# Patient Record
Sex: MALE | Race: BLACK OR AFRICAN AMERICAN | Employment: FULL TIME | URBAN - METROPOLITAN AREA
[De-identification: names, ages, dates, MRNs, and addresses within clinical notes are randomized per-mention and may not be internally consistent; named-entity substitution may affect disease eponyms.]

---

## 2017-04-06 ENCOUNTER — OFFICE VISIT (OUTPATIENT)
Dept: ORTHOPEDIC SURGERY | Age: 42
End: 2017-04-06

## 2017-04-06 VITALS
HEART RATE: 83 BPM | BODY MASS INDEX: 27.17 KG/M2 | DIASTOLIC BLOOD PRESSURE: 86 MMHG | WEIGHT: 205 LBS | SYSTOLIC BLOOD PRESSURE: 138 MMHG | HEIGHT: 73 IN

## 2017-04-06 DIAGNOSIS — M65.4 RADIAL STYLOID TENOSYNOVITIS OF RIGHT HAND: Primary | ICD-10-CM

## 2017-04-06 PROCEDURE — 20550 NJX 1 TENDON SHEATH/LIGAMENT: CPT | Performed by: ORTHOPAEDIC SURGERY

## 2017-04-06 PROCEDURE — 99213 OFFICE O/P EST LOW 20 MIN: CPT | Performed by: ORTHOPAEDIC SURGERY

## 2017-04-06 RX ORDER — SULFAMETHOXAZOLE AND TRIMETHOPRIM 800; 160 MG/1; MG/1
TABLET ORAL
COMMUNITY
Start: 2017-03-16 | End: 2017-05-19 | Stop reason: ALTCHOICE

## 2017-04-06 RX ORDER — TRIAMCINOLONE ACETONIDE 1 MG/G
CREAM TOPICAL
COMMUNITY
Start: 2017-03-22 | End: 2017-11-21 | Stop reason: ALTCHOICE

## 2017-04-06 RX ORDER — DARUNAVIR ETHANOLATE AND COBICISTAT 800; 150 MG/1; MG/1
1 TABLET, FILM COATED ORAL EVERY EVENING
COMMUNITY
Start: 2017-03-01

## 2017-04-06 RX ORDER — IMIQUIMOD 12.5 MG/.25G
CREAM TOPICAL
COMMUNITY
Start: 2017-03-15 | End: 2017-11-21 | Stop reason: ALTCHOICE

## 2017-04-06 RX ORDER — ABACAVIR AND LAMIVUDINE 600; 300 MG/1; MG/1
1 TABLET, FILM COATED ORAL EVERY EVENING
COMMUNITY
Start: 2017-03-29

## 2017-04-06 RX ORDER — AMMONIUM LACTATE 12 G/100G
LOTION TOPICAL
COMMUNITY
Start: 2017-01-28 | End: 2017-11-21 | Stop reason: ALTCHOICE

## 2017-12-07 ENCOUNTER — OFFICE VISIT (OUTPATIENT)
Dept: ORTHOPEDIC SURGERY | Age: 42
End: 2017-12-07

## 2017-12-07 VITALS — RESPIRATION RATE: 16 BRPM | WEIGHT: 213 LBS | HEIGHT: 73 IN | BODY MASS INDEX: 28.23 KG/M2

## 2017-12-07 DIAGNOSIS — M65.4 RADIAL STYLOID TENOSYNOVITIS OF RIGHT HAND: Primary | ICD-10-CM

## 2017-12-07 PROCEDURE — 99212 OFFICE O/P EST SF 10 MIN: CPT | Performed by: ORTHOPAEDIC SURGERY

## 2017-12-07 PROCEDURE — G8419 CALC BMI OUT NRM PARAM NOF/U: HCPCS | Performed by: ORTHOPAEDIC SURGERY

## 2017-12-07 PROCEDURE — 4004F PT TOBACCO SCREEN RCVD TLK: CPT | Performed by: ORTHOPAEDIC SURGERY

## 2017-12-07 PROCEDURE — G8427 DOCREV CUR MEDS BY ELIG CLIN: HCPCS | Performed by: ORTHOPAEDIC SURGERY

## 2017-12-07 PROCEDURE — G8484 FLU IMMUNIZE NO ADMIN: HCPCS | Performed by: ORTHOPAEDIC SURGERY

## 2017-12-07 NOTE — COMMUNICATION BODY
The patient returns to the office because of recurrent pain and swelling of the right  wrist.  The previous injection was completely successful in relieving the prior symptoms, but unfortunately they have recurred. Symptoms include pain in the area of the radial styloid, aggravated by wrist and thumb movement, swelling and erythema. The condition seems to be worsening. There are no symptoms noted in the opposite wrist.    The patient's social history, past medical history, family history, medications, allergies and review of systems have been reviewed, and dated and are recorded in the chart. On physical exam there is soft tissue swelling in the area of the radial styloid. There is tenderness over the first dorsal compartment. The Trino test is positive. Skin is intact, as is distal circulation and sensation. Gross muscle strength is normal bilaterally. Hand and wrist joints are stable. There are no subcutaneous nodules or enlarged epitrochlear lymph nodes. There are no abnormal clinical findings in the opposite wrist.      Impression:  Recurrent radial styloid tendonitis right wrist.    The patient declines an additional steroid injection. The nature of their medical problem is fully discussed with the patient, including all treatment options. Surgery is also discussed, including the possible risks, complications, prognosis and postoperative care. All questions are answered. The surgery consent form is explained and signed. Surgery will be scheduled and the patient is asked to call me if there are any additional questions. The patient understands that the surgery will be done by Dr. Kika Reyna.

## 2017-12-07 NOTE — LETTER
[facility]  [address] [facility] [place]  [address] [facility] [address]  Phone: [phone]  Fax: [phone]    Nicki Irby MD        December 7, 2017     [address] [facility]    Patient: Mina Ruiz  MR Number: [id number]  YOB: 1975  Date of Visit: 12/7/2017    Dear Dr. Brown Mess:    Aisha Doll was seen for evaluation. Below are the relevant portions of my assessment and plan of care. The patient returns to the office because of recurrent pain and swelling of the right  wrist.  The previous injection was completely successful in relieving the prior symptoms, but unfortunately they have recurred. Symptoms include pain in the area of the radial styloid, aggravated by wrist and thumb movement, swelling and erythema. The condition seems to be worsening. There are no symptoms noted in the opposite wrist.    The patient's social history, past medical history, family history, medications, allergies and review of systems have been reviewed, and dated and are recorded in the chart. On physical exam there is soft tissue swelling in the area of the radial styloid. There is tenderness over the first dorsal compartment. The [place] test is positive. Skin is intact, as is distal circulation and sensation. Gross muscle strength is normal bilaterally. Hand and wrist joints are stable. There are no subcutaneous nodules or enlarged epitrochlear lymph nodes. There are no abnormal clinical findings in the opposite wrist.      Impression:  Recurrent radial styloid tendonitis right wrist.    The patient declines an additional steroid injection. The nature of their medical problem is fully discussed with the patient, including all treatment options. Surgery is also discussed, including the possible risks, complications, prognosis and postoperative care.   All

## 2017-12-07 NOTE — LETTER
CONSENT TO OPERATION  AND/OR OTHER PROCEDURE(S)          PATIENT : Gracie Colby OF BIRTH:  1975      DATE : 12/7/17          1. I request and consent that Dr. Delfin Verdugo. Harjinder Bautista,  and/or his associates or assistants perform an operation and/or procedures on the above patient at  [place] [address], to treat the condition(s) which appear indicated by the diagnostic studies already performed. I have been told that in general terms the nature, purpose and reasonable expectations of the operation and/or procedure(s) are:     Release Right First Dorsal Compartment      2. It has been explained to me by the informing physician that during the course of the operation and/or procedure(s) unforeseen conditions may be revealed that necessitate an extension of the original operation and/or procedure(s) or different operation and/or procedures than those set forth in Paragraph 1. I therefore authorize and request that my physician and/or his associates or assistants perform such operations and/or procedures as are necessary and desirable in the exercise of professional judgment. The authority granted under this Paragraph 2 shall extend to all conditions that require treatment and are known to my physician at the time the operation is commenced. 3. I have been made aware by the informing physician of certain risks and consequences that are associated with the operation and/or procedure(s) described in Paragraph 1, the reasonable alternative methods or treatment, the possible consequences, the possibility that the operation and/or procedure(s) may be unsuccessful and the possibility of complications. I understand the reasonably known risks to be:      ? Bleeding  ? Infection  ? Poor Healing  ? Possible Damage to Nerve, Vessel, Tendon/Muscle or Bone  ? Need for further Treatment/Surgery  ? Stiffness  ? Pain  ? Residual or Recurrent Symptoms  ? Anesthetic and/or Medical Risks  ? 4. I have also been informed by the informing physician that there are other risks from both known and unknown causes that are attendant to the performance of any surgical procedure. I am aware that the practice of medicine and surgery is not an exact science, and that no guarantees have been made to me concerning the results of the operation and/or procedure(s). 5. I   CONSENT / REFUSE CONSENT  (strike the phrase that does not apply) to the taking of photographs before, during and/or after the operation or procedure for scientific/educational purposes. 6. I consent to the administration of anesthesia and to the use of such anesthetics as may be deemed advisable by the anesthesiologist who has been engaged by me or my physician. 7. I certify that I have read and understand the above consent to operation and/or other procedure(s); that the explanations therein referred to were made to me by the informing physician in advance of my signing this consent; that all blanks or statements requiring insertion or completion were filled in and inapplicable paragraphs, if any, were stricken before I signed; and that all questions asked by me about the operation and/or procedure(s) which I have consented to have been fully answered in a satisfactory manner.               _______________________  Witness        Signature Of Patient          Raymond Medley. Palak Juarez      _______________________  Informing Physician         Signature of Informing Physician           If patient is unable to sign or is a minor, complete one of the following:    (A)  Patient is a minor   years of age. (B)  Patient is unable to sign because: The undersigned represents that he or she is duly authorized to execute this consent for and on behalf of the above named patient.                Witness               o  Parent  o  Guardian   o  Spouse       o  Other (specify)

## 2017-12-07 NOTE — LETTER
[facility]  Surgery Scheduling Form:  DEMOGRAPHICS:                                                                                                                Patient Name:  Abiola Hanson  Patient :  1975   Patient SS#:  [id number]    Patient Phone:  [phone] (home)      Patient Address:  [address] [place] [address]    PCP:  Tika Mccollum 1620:   Payor/Plan Subscr  Sex Relation Sub. Ins. ID Effective Group Num   1. [employer] - * AKANKSHA PEARL 1975 Male Self [id number] [address]                                   [address]   2.  - TRI* AKANKSHA PEARL 1975 Male  [id number]                                    [address]     DIAGNOSIS & PROCEDURE:                                                                                              Diagnosis:   right DeQuervain's Tendonitis  (727.04)  Operation:  right 1st Dorsal Compartment (DeQuervain's) Release  Location:  [facility]  Surgeon:  Norbert Winn    SCHEDULING INFORMATION:                                                                                         .  Surgeon's Scheduling Instruction:  Elective      Requested Date:    OR Time:  8:15 Patient Arrival Time:  6:45    OR Time Required:  10  Minutes  Anesthesia:  MAC/TIVA  Equipment:  None  Mini C-Arm:  No   Standard C-Arm:  No  Status:  outpatient  PAT Required:  Yes  Comments: After the holidays. ALLERGIES:   KINDRA Andrews MD  17     BILLING INFORMATION:                                                                                                     Procedure:       CPT Code Modifier

## 2017-12-08 NOTE — ADDENDUM NOTE
Encounter addended by: Rosanna Wolfe MA on: 12/8/2017  9:35 AM<BR>    Actions taken: Letter status changed

## 2018-01-17 ENCOUNTER — TELEPHONE (OUTPATIENT)
Dept: ORTHOPEDIC SURGERY | Age: 43
End: 2018-01-17

## 2018-01-17 NOTE — ADDENDUM NOTE
Encounter addended by: Moriah Stock MA on: 1/17/2018 11:47 AM<BR>    Actions taken: Letter status changed

## 2018-01-17 NOTE — ADDENDUM NOTE
Encounter addended by: Dexter Ambrosio MA on: 1/17/2018 11:51 AM<BR>    Actions taken: Letter status changed

## 2018-01-18 ENCOUNTER — PAT TELEPHONE (OUTPATIENT)
Dept: PREADMISSION TESTING | Age: 43
End: 2018-01-18

## 2018-01-18 VITALS — WEIGHT: 208 LBS | BODY MASS INDEX: 27.57 KG/M2 | HEIGHT: 73 IN

## 2018-01-18 RX ORDER — SULFAMETHOXAZOLE AND TRIMETHOPRIM 800; 160 MG/1; MG/1
1 TABLET ORAL EVERY EVENING
COMMUNITY
Start: 2017-11-08 | End: 2018-06-05

## 2018-01-18 RX ORDER — TRIAMCINOLONE ACETONIDE 1 MG/G
CREAM TOPICAL PRN
COMMUNITY
Start: 2017-11-22 | End: 2018-06-05

## 2018-01-18 RX ORDER — AZITHROMYCIN 600 MG/1
600 TABLET, FILM COATED ORAL SEE ADMIN INSTRUCTIONS
COMMUNITY
End: 2018-05-16 | Stop reason: ALTCHOICE

## 2018-01-18 NOTE — PRE-PROCEDURE INSTRUCTIONS
C-Difficile admission screening and protocol:     * Admitted with diarrhea? no     *Prior history of C-Diff. In last 3 months? no     *Antibiotic use in the past 6-8 weeks?-yes     *Prior hospitalization or nursing home in the last month?     no
any jewelry or body piercing's on the day of surgery. All jewelry must be removed. If you have dentures, they will be removed before going to operating room. For your convenience, we will provide you with a container. If you wear contact lenses or glasses, they will be removed, please bring a case for them. If you have a living will and a durable power of  for healthcare, please bring in a copy. As part of our patient safety program to minimize surgical site infections, we ask you to do the following:    · Please notify your surgeon if you develop any illness between         now and the  day of your surgery. · This includes a cough, cold, fever, sore throat, nausea,         or vomiting, and diarrhea, etc.  ·  Please notify your surgeon if you experience dizziness, shortness         of breath or blurred vision between now and the time of your surgery. Do not shave your operative site 96 hours prior to surgery. For face and neck surgery, men may use an electric razor 48 hours   prior to surgery. You may shower the night before surgery or the morning of   your surgery with an antibacterial soap. You will need to bring a photo ID and insurance card    [facility] has an onsite pharmacy, would you like to utilize our pharmacy     If you will be staying overnight and use a C-pap machine, please bring   your C-pap to hospital     Our goal is to provide you with excellent care, therefore, visitors will be limited to two(2) in the room at a time so that we may focus on providing this care for you. Please contact pre-admission testing if you have any further questions. [facility] phone number:  [phone] [facility] fax number:  [phone]  Please note these are generalized instructions for all surgical cases, you may be provided with more specific instructions according to your surgery.

## 2018-01-19 ENCOUNTER — PAT TELEPHONE (OUTPATIENT)
Dept: PREADMISSION TESTING | Age: 43
End: 2018-01-19

## 2018-01-23 ENCOUNTER — HOSPITAL ENCOUNTER (OUTPATIENT)
Dept: SURGERY | Age: 43
Discharge: HOME | End: 2018-01-23
Attending: ORTHOPAEDIC SURGERY | Admitting: ORTHOPAEDIC SURGERY

## 2018-01-23 VITALS
OXYGEN SATURATION: 98 % | HEART RATE: 68 BPM | BODY MASS INDEX: 26.88 KG/M2 | HEIGHT: 73 IN | TEMPERATURE: 98 F | WEIGHT: 202.82 LBS | DIASTOLIC BLOOD PRESSURE: 97 MMHG | SYSTOLIC BLOOD PRESSURE: 180 MMHG | RESPIRATION RATE: 16 BRPM

## 2018-01-23 RX ORDER — OXYCODONE HYDROCHLORIDE 5 MG/1
10 TABLET ORAL PRN
Status: COMPLETED | OUTPATIENT
Start: 2018-01-23 | End: 2018-01-23

## 2018-01-23 RX ORDER — MORPHINE SULFATE 2 MG/ML
1 INJECTION, SOLUTION INTRAMUSCULAR; INTRAVENOUS EVERY 5 MIN PRN
Status: DISCONTINUED | OUTPATIENT
Start: 2018-01-23 | End: 2018-01-24 | Stop reason: HOSPADM

## 2018-01-23 RX ORDER — FENTANYL CITRATE 50 UG/ML
50 INJECTION, SOLUTION INTRAMUSCULAR; INTRAVENOUS EVERY 5 MIN PRN
Status: DISCONTINUED | OUTPATIENT
Start: 2018-01-23 | End: 2018-01-24 | Stop reason: HOSPADM

## 2018-01-23 RX ORDER — SODIUM CHLORIDE 0.9 % (FLUSH) 0.9 %
10 SYRINGE (ML) INJECTION PRN
Status: DISCONTINUED | OUTPATIENT
Start: 2018-01-23 | End: 2018-01-24 | Stop reason: HOSPADM

## 2018-01-23 RX ORDER — ONDANSETRON 2 MG/ML
4 INJECTION INTRAMUSCULAR; INTRAVENOUS
Status: ACTIVE | OUTPATIENT
Start: 2018-01-23 | End: 2018-01-23

## 2018-01-23 RX ORDER — SODIUM CHLORIDE 9 MG/ML
INJECTION, SOLUTION INTRAVENOUS CONTINUOUS
Status: DISCONTINUED | OUTPATIENT
Start: 2018-01-23 | End: 2018-01-24 | Stop reason: HOSPADM

## 2018-01-23 RX ORDER — MEPERIDINE HYDROCHLORIDE 25 MG/ML
12.5 INJECTION INTRAMUSCULAR; INTRAVENOUS; SUBCUTANEOUS EVERY 5 MIN PRN
Status: DISCONTINUED | OUTPATIENT
Start: 2018-01-23 | End: 2018-01-24 | Stop reason: HOSPADM

## 2018-01-23 RX ORDER — OXYCODONE HYDROCHLORIDE 5 MG/1
5 TABLET ORAL PRN
Status: COMPLETED | OUTPATIENT
Start: 2018-01-23 | End: 2018-01-23

## 2018-01-23 RX ORDER — SODIUM CHLORIDE 0.9 % (FLUSH) 0.9 %
10 SYRINGE (ML) INJECTION EVERY 12 HOURS SCHEDULED
Status: DISCONTINUED | OUTPATIENT
Start: 2018-01-23 | End: 2018-01-24 | Stop reason: HOSPADM

## 2018-01-23 RX ORDER — FENTANYL CITRATE 50 UG/ML
25 INJECTION, SOLUTION INTRAMUSCULAR; INTRAVENOUS EVERY 5 MIN PRN
Status: DISCONTINUED | OUTPATIENT
Start: 2018-01-23 | End: 2018-01-24 | Stop reason: HOSPADM

## 2018-01-23 RX ORDER — MORPHINE SULFATE 2 MG/ML
2 INJECTION, SOLUTION INTRAMUSCULAR; INTRAVENOUS EVERY 5 MIN PRN
Status: DISCONTINUED | OUTPATIENT
Start: 2018-01-23 | End: 2018-01-24 | Stop reason: HOSPADM

## 2018-01-23 RX ADMIN — FENTANYL CITRATE 50 MCG: 50 INJECTION, SOLUTION INTRAMUSCULAR; INTRAVENOUS at 08:41

## 2018-01-23 RX ADMIN — SODIUM CHLORIDE: 9 INJECTION, SOLUTION INTRAVENOUS at 07:30

## 2018-01-23 RX ADMIN — FENTANYL CITRATE 50 MCG: 50 INJECTION, SOLUTION INTRAMUSCULAR; INTRAVENOUS at 08:46

## 2018-01-23 RX ADMIN — OXYCODONE HYDROCHLORIDE 10 MG: 5 TABLET ORAL at 09:19

## 2018-01-23 ASSESSMENT — PAIN SCALES - GENERAL
PAINLEVEL_OUTOF10: 6
PAINLEVEL_OUTOF10: 3
PAINLEVEL_OUTOF10: 7
PAINLEVEL_OUTOF10: 9

## 2018-01-23 ASSESSMENT — PAIN - FUNCTIONAL ASSESSMENT: PAIN_FUNCTIONAL_ASSESSMENT: 0-10

## 2018-01-23 ASSESSMENT — PAIN DESCRIPTION - ORIENTATION
ORIENTATION: LEFT
ORIENTATION: LEFT

## 2018-01-23 ASSESSMENT — PAIN DESCRIPTION - LOCATION
LOCATION: ARM
LOCATION: ARM

## 2018-01-23 ASSESSMENT — LIFESTYLE VARIABLES: SMOKING_STATUS: 0

## 2018-01-23 ASSESSMENT — PAIN DESCRIPTION - PAIN TYPE
TYPE: SURGICAL PAIN
TYPE: SURGICAL PAIN

## 2018-01-23 ASSESSMENT — ENCOUNTER SYMPTOMS: SHORTNESS OF BREATH: 0

## 2018-01-23 NOTE — ANESTHESIA PRE-OP
Department of Anesthesiology  Preprocedure Note       Name:  Blanca Goyal   Age:  43 y.o.  :  1975                                          MRN:  [id number]         Date:  2018      [facility] Department of Anesthesiology  Pre-Anesthesia Evaluation/Consultation       Name:  Nohemi Marrero Sr                                         Age:  43 y.o. MRN:  [id number]           Procedure (Scheduled):  Right first dorsal compartment release  Surgeon:  Dr. Brandon Nice     Allergies   Allergen Reactions    Amoxicillin Hives     sweats    Penicillins Hives     sweats     Patient Active Problem List   Diagnosis    Radial styloid tenosynovitis of right hand    Sprain of finger of left hand     Past Medical History:   Diagnosis Date    Asthma     as a child    HIV disease ([facility] [address]      Past Surgical History:   Procedure Laterality Date    ANKLE SURGERY Right      Social History   Substance Use Topics    Smoking status: Current Every Day Smoker     Packs/day: 0.50     Years: 20.00     Types: Cigarettes    Smokeless tobacco: Never Used    Alcohol use Yes      Comment: socially     Medications  Current Outpatient Prescriptions on File Prior to Encounter   Medication Sig Dispense Refill    triamcinolone (KENALOG) 0.1 % cream Apply topically as needed       sulfamethoxazole-trimethoprim (BACTRIM DS;SEPTRA DS) 800-160 MG per tablet Take 1 tablet by mouth every evening       azithromycin (ZITHROMAX) 600 MG tablet Take 600 mg by mouth See Admin Instructions 2times/week      ibuprofen (ADVIL;MOTRIN) 600 MG tablet Take 1 tablet by mouth every 6 hours as needed for Pain 20 tablet 0    abacavir-lamivudine (EPZICOM) 600-300 MG per tablet Take 1 tablet by mouth every evening       PREZCOBIX 800-150 MG TABS Take 1 tablet by mouth every evening        No current facility-administered medications on file prior to encounter.       Current Outpatient Prescriptions   Medication Sig Dispense Refill    triamcinolone 01/19/2015    AGRATIO 1.0 01/19/2015    LABGLOM >60 01/19/2015    GLUCOSE 93 01/19/2015    PROT 7.5 01/19/2015    CALCIUM 9.4 01/19/2015    BILITOT 0.7 01/19/2015    ALKPHOS 76 01/19/2015    AST 19 01/19/2015    ALT 15 01/19/2015     BMP    Lab Results   Component Value Date     01/19/2015    K 3.6 01/19/2015     01/19/2015    CO2 25 01/19/2015    BUN 12 01/19/2015    CREATININE 0.7 01/19/2015    CALCIUM 9.4 01/19/2015    GFRAA >60 01/19/2015    LABGLOM >60 01/19/2015    GLUCOSE 93 01/19/2015     POCGlucose  No results for input(s): GLUCOSE in the last 72 hours. Coags  No results found for: PROTIME, INR, APTT  HCG (If Applicable) No results found for: PREGTESTUR, PREGSERUM, HCG, HCGQUANT   ABGs No results found for: PHART, PO2ART, AWI8PSD, ELL5XSC, BEART, R3QQJYZF   Type & Screen (If Applicable)  No results found for: [facility]      Surgeon:    Procedure:    Medications prior to admission:   Prior to Admission medications    Medication Sig Start Date End Date Taking?  Authorizing Provider   triamcinolone (KENALOG) 0.1 % cream Apply topically as needed  11/22/17   Historical Provider, MD   sulfamethoxazole-trimethoprim (BACTRIM DS;SEPTRA DS) 800-160 MG per tablet Take 1 tablet by mouth every evening  11/8/17   Historical Provider, MD   azithromycin (ZITHROMAX) 600 MG tablet Take 600 mg by mouth See Admin Instructions 2times/week    Historical Provider, MD   ibuprofen (ADVIL;MOTRIN) 600 MG tablet Take 1 tablet by mouth every 6 hours as needed for Pain 12/30/17   Zoila Melendez PA-C   abacavir-lamivudine (EPZICOM) 600-300 MG per tablet Take 1 tablet by mouth every evening  3/29/17   Historical Provider, MD   PREZCOBIX 800-150 MG TABS Take 1 tablet by mouth every evening  3/1/17   Historical Provider, MD       Current medications:    Current Outpatient Prescriptions   Medication Sig Dispense Refill    triamcinolone (KENALOG) 0.1 % cream Apply topically as needed       sulfamethoxazole-trimethoprim (BACTRIM DS;SEPTRA DS) 800-160 MG per tablet Take 1 tablet by mouth every evening       azithromycin (ZITHROMAX) 600 MG tablet Take 600 mg by mouth See Admin Instructions 2times/week      ibuprofen (ADVIL;MOTRIN) 600 MG tablet Take 1 tablet by mouth every 6 hours as needed for Pain 20 tablet 0    abacavir-lamivudine (EPZICOM) 600-300 MG per tablet Take 1 tablet by mouth every evening       PREZCOBIX 800-150 MG TABS Take 1 tablet by mouth every evening        No current facility-administered medications for this encounter. Allergies: Allergies   Allergen Reactions    Amoxicillin Hives     sweats    Penicillins Hives     sweats       Problem List:    Patient Active Problem List   Diagnosis Code    Radial styloid tenosynovitis of right hand M65.4    Sprain of finger of left hand S63.619A       Past Medical History:        Diagnosis Date    Asthma     as a child    HIV disease ([facility] [address]        Past Surgical History:        Procedure Laterality Date    ANKLE SURGERY Right        Social History:    Social History   Substance Use Topics    Smoking status: Current Every Day Smoker     Packs/day: 0.50     Years: 20.00     Types: Cigarettes    Smokeless tobacco: Never Used    Alcohol use Yes      Comment: socially                                Ready to quit: Not Answered  Counseling given: Not Answered      Vital Signs (Current):   Vitals:    01/23/18 0700   BP: (!) 172/99   Pulse: 77   Temp: 97.1 °F (36.2 °C)   SpO2: 99%                                              BP Readings from Last 3 Encounters:   01/23/18 (!) 172/99   12/30/17 (!) 169/105   12/01/17 (!) 165/90       NPO Status:                                                    more than 8hours                             BMI:   Wt Readings from Last 3 Encounters:   01/18/18 208 lb (94.3 kg)   12/30/17 205 lb 14.6 oz (93.4 kg)   12/07/17 213 lb (96.6 kg)     There is no height or weight on file to calculate BMI.     Anesthesia Evaluation  Patient summary reviewed  Airway: Mallampati: II  TM distance: >3 FB   Neck ROM: full  Mouth opening: > = 3 FB Dental:          Pulmonary:normal exam    (+) sleep apnea:  asthma:     (-) pneumonia, COPD, shortness of breath, recent URI and not a current smoker                           Cardiovascular:Negative CV ROS  Exercise tolerance: good (>4 METS),           Rhythm: regular  Rate: normal                    Neuro/Psych:   Negative Neuro/Psych ROS              GI/Hepatic/Renal:        (-) hiatal hernia, GERD, PUD, hepatitis, liver disease, no renal disease, bowel prep and no morbid obesity       Endo/Other:    (+) no malignancy/cancer. (-) hypothyroidism, hyperthyroidism, blood dyscrasia, arthritis, no Type II DM, no Type I DM, no electrolyte abnormalities, no malignancy/cancer                ROS comment: HIV + Abdominal:           Vascular: negative vascular ROS. Anesthesia Plan      MAC     ASA 3       Induction: intravenous. MIPS: Prophylactic antiemetics administered. Anesthetic plan and risks discussed with patient. Plan discussed with CRNA. Karma Patel MD   1/23/2018        This pre-anesthesia assessment may be used as a history and physical.    DOS STAFF ADDENDUM:    Pt seen and examined, chart reviewed (including anesthesia, drug and allergy history). No interval changes to history and physical examination. Anesthetic plan, risks, benefits, alternatives, and personnel involved discussed with patient. Patient verbalized an understanding and agrees to proceed.       Karma Patel MD  January 23, 2018  7:09 AM

## 2018-01-23 NOTE — PROGRESS NOTES
Alert and oriented. C/o 7/10 surgical right arm pain. Dressing clean dry intact. Fingers warm, move well, yari well. Tolerated sitting up an po fluids and cookies well. Wife at bedside. Understands discharge instructions. Analgesic given for c/o pain. BP has been elevated the whole time of this hospitalization.  Patient says he is aware and is going to follow up with his

## 2018-01-26 NOTE — ADDENDUM NOTE
Encounter addended by: Lisa Parmar MD on: 1/26/2018  8:01 AM<BR>    Actions taken: Letter status changed

## 2018-01-29 ENCOUNTER — OFFICE VISIT (OUTPATIENT)
Dept: ORTHOPEDIC SURGERY | Age: 43
End: 2018-01-29

## 2018-01-29 VITALS — HEIGHT: 73 IN | BODY MASS INDEX: 26.77 KG/M2 | WEIGHT: 202 LBS | RESPIRATION RATE: 16 BRPM

## 2018-01-29 DIAGNOSIS — M65.4 RADIAL STYLOID TENOSYNOVITIS OF RIGHT HAND: Primary | ICD-10-CM

## 2018-01-29 PROCEDURE — 99024 POSTOP FOLLOW-UP VISIT: CPT | Performed by: PHYSICIAN ASSISTANT

## 2018-01-29 RX ORDER — IMIQUIMOD 12.5 MG/.25G
CREAM TOPICAL
COMMUNITY
Start: 2018-01-17 | End: 2019-02-20

## 2018-01-31 NOTE — ADDENDUM NOTE
Encounter addended by: Zach Villalpando MA on: 1/31/2018  8:39 AM<BR>    Actions taken: Letter status changed

## 2018-02-08 ENCOUNTER — OFFICE VISIT (OUTPATIENT)
Dept: ORTHOPEDIC SURGERY | Age: 43
End: 2018-02-08

## 2018-02-08 VITALS — HEIGHT: 73 IN | RESPIRATION RATE: 16 BRPM | BODY MASS INDEX: 26.77 KG/M2 | WEIGHT: 202 LBS

## 2018-02-08 DIAGNOSIS — M65.4 RADIAL STYLOID TENOSYNOVITIS: Primary | ICD-10-CM

## 2018-02-08 PROCEDURE — 99024 POSTOP FOLLOW-UP VISIT: CPT | Performed by: ORTHOPAEDIC SURGERY

## 2018-02-08 NOTE — PROGRESS NOTES
Mr. Idania Drummond is seen today in follow of his Right DeQuervain's release done 16 days ago. He presents today for scheduled suture removal according to Dr. Crista Elliott' postoperative protocol. He today has no other new concerns. His wound is inspected and is healing well, no significant drainage, no dehiscence, no significant erythema. There appear to be no contraindications to suture removal.    Mr. Wyatt Babcock Sr's sutures were removed today without difficulty. Skin remained well approximated. He is provided appropriate instructions on the management of the incision and is asked to follow all of Dr. Bre Perez' prior instructions. Mr. Idania Drummond is asked to contact Dr. Bre Perez or schedule a follow-up appointment at any time that he feels the need for any further evaluation or treatment for his incision or requires any further post-operative care. If he feels that he continues to be feeling and functioning well, he may choose not to seek any further follow-up or treatment at his discretion. Our office will remain available to continue his care at any time in the future.

## 2019-02-20 ENCOUNTER — HOSPITAL ENCOUNTER (EMERGENCY)
Age: 44
Discharge: HOME | End: 2019-02-20
Attending: EMERGENCY MEDICINE
Payer: OTHER GOVERNMENT

## 2019-02-20 VITALS
WEIGHT: 216.49 LBS | OXYGEN SATURATION: 99 % | DIASTOLIC BLOOD PRESSURE: 74 MMHG | HEART RATE: 79 BPM | SYSTOLIC BLOOD PRESSURE: 158 MMHG | HEIGHT: 73 IN | BODY MASS INDEX: 28.69 KG/M2 | TEMPERATURE: 98.4 F | RESPIRATION RATE: 16 BRPM

## 2019-02-20 DIAGNOSIS — M79.18 MUSCULOSKELETAL PAIN: Primary | ICD-10-CM

## 2019-02-20 PROCEDURE — 99283 EMERGENCY DEPT VISIT LOW MDM: CPT

## 2019-02-20 RX ORDER — ACETAMINOPHEN 500 MG
500 TABLET ORAL 4 TIMES DAILY PRN
Qty: 60 TABLET | Refills: 0 | Status: SHIPPED | OUTPATIENT
Start: 2019-02-20 | End: 2019-02-26

## 2019-02-20 RX ORDER — IBUPROFEN 400 MG/1
400 TABLET ORAL EVERY 6 HOURS PRN
Qty: 20 TABLET | Refills: 0 | Status: SHIPPED | OUTPATIENT
Start: 2019-02-20 | End: 2019-02-26

## 2019-02-20 ASSESSMENT — PAIN DESCRIPTION - PAIN TYPE: TYPE: ACUTE PAIN

## 2019-02-20 ASSESSMENT — PAIN SCALES - GENERAL: PAINLEVEL_OUTOF10: 5

## 2019-02-20 ASSESSMENT — PAIN DESCRIPTION - ORIENTATION: ORIENTATION: LEFT

## 2019-02-20 ASSESSMENT — PAIN DESCRIPTION - LOCATION: LOCATION: NECK

## 2019-02-20 ASSESSMENT — PAIN - FUNCTIONAL ASSESSMENT: PAIN_FUNCTIONAL_ASSESSMENT: 0-10

## 2019-02-20 ASSESSMENT — PAIN DESCRIPTION - DESCRIPTORS: DESCRIPTORS: ACHING

## 2019-02-26 ENCOUNTER — APPOINTMENT (OUTPATIENT)
Dept: CT IMAGING | Age: 44
End: 2019-02-26
Payer: OTHER GOVERNMENT

## 2019-02-26 ENCOUNTER — HOSPITAL ENCOUNTER (EMERGENCY)
Age: 44
Discharge: HOME | End: 2019-02-26
Attending: FAMILY MEDICINE
Payer: OTHER GOVERNMENT

## 2019-02-26 VITALS
DIASTOLIC BLOOD PRESSURE: 83 MMHG | WEIGHT: 218.7 LBS | SYSTOLIC BLOOD PRESSURE: 154 MMHG | HEIGHT: 73 IN | BODY MASS INDEX: 28.98 KG/M2 | HEART RATE: 76 BPM | TEMPERATURE: 98 F | RESPIRATION RATE: 18 BRPM | OXYGEN SATURATION: 99 %

## 2019-02-26 DIAGNOSIS — M62.838 SPASM OF MUSCLE: Primary | ICD-10-CM

## 2019-02-26 DIAGNOSIS — M25.512 ACUTE PAIN OF LEFT SHOULDER: ICD-10-CM

## 2019-02-26 LAB
A/G RATIO: 0.9 (ref 1.1–2.2)
ALBUMIN SERPL-MCNC: 3.4 G/DL (ref 3.4–5)
ALP BLD-CCNC: 134 U/L (ref 40–129)
ALT SERPL-CCNC: 11 U/L (ref 10–40)
ANION GAP SERPL CALCULATED.3IONS-SCNC: 12 MMOL/L (ref 3–16)
AST SERPL-CCNC: 16 U/L (ref 15–37)
BASOPHILS ABSOLUTE: 0 K/UL (ref 0–0.2)
BASOPHILS RELATIVE PERCENT: 0.9 %
BILIRUB SERPL-MCNC: <0.2 MG/DL (ref 0–1)
BUN BLDV-MCNC: 17 MG/DL (ref 7–20)
CALCIUM SERPL-MCNC: 9.2 MG/DL (ref 8.3–10.6)
CHLORIDE BLD-SCNC: 106 MMOL/L (ref 99–110)
CO2: 24 MMOL/L (ref 21–32)
CREAT SERPL-MCNC: 1.2 MG/DL (ref 0.9–1.3)
EOSINOPHILS ABSOLUTE: 0.2 K/UL (ref 0–0.6)
EOSINOPHILS RELATIVE PERCENT: 5.3 %
GFR AFRICAN AMERICAN: >60
GFR NON-AFRICAN AMERICAN: >60
GLOBULIN: 4 G/DL
GLUCOSE BLD-MCNC: 106 MG/DL (ref 70–99)
HCT VFR BLD CALC: 35.4 % (ref 40.5–52.5)
HEMOGLOBIN: 11.9 G/DL (ref 13.5–17.5)
LYMPHOCYTES ABSOLUTE: 1.8 K/UL (ref 1–5.1)
LYMPHOCYTES RELATIVE PERCENT: 38.5 %
MCH RBC QN AUTO: 33.6 PG (ref 26–34)
MCHC RBC AUTO-ENTMCNC: 33.6 G/DL (ref 31–36)
MCV RBC AUTO: 100.1 FL (ref 80–100)
MONOCYTES ABSOLUTE: 0.3 K/UL (ref 0–1.3)
MONOCYTES RELATIVE PERCENT: 5.8 %
NEUTROPHILS ABSOLUTE: 2.3 K/UL (ref 1.7–7.7)
NEUTROPHILS RELATIVE PERCENT: 49.5 %
PDW BLD-RTO: 14.9 % (ref 12.4–15.4)
PLATELET # BLD: 216 K/UL (ref 135–450)
PMV BLD AUTO: 8.4 FL (ref 5–10.5)
POTASSIUM SERPL-SCNC: 4.1 MMOL/L (ref 3.5–5.1)
RBC # BLD: 3.54 M/UL (ref 4.2–5.9)
SODIUM BLD-SCNC: 142 MMOL/L (ref 136–145)
TOTAL PROTEIN: 7.4 G/DL (ref 6.4–8.2)
WBC # BLD: 4.6 K/UL (ref 4–11)

## 2019-02-26 PROCEDURE — 96361 HYDRATE IV INFUSION ADD-ON: CPT

## 2019-02-26 PROCEDURE — 36415 COLL VENOUS BLD VENIPUNCTURE: CPT

## 2019-02-26 PROCEDURE — 6360000002 HC RX W HCPCS: Performed by: FAMILY MEDICINE

## 2019-02-26 PROCEDURE — 2580000003 HC RX 258: Performed by: FAMILY MEDICINE

## 2019-02-26 PROCEDURE — 80053 COMPREHEN METABOLIC PANEL: CPT

## 2019-02-26 PROCEDURE — 96375 TX/PRO/DX INJ NEW DRUG ADDON: CPT

## 2019-02-26 PROCEDURE — 99284 EMERGENCY DEPT VISIT MOD MDM: CPT

## 2019-02-26 PROCEDURE — 96374 THER/PROPH/DIAG INJ IV PUSH: CPT

## 2019-02-26 PROCEDURE — 6370000000 HC RX 637 (ALT 250 FOR IP): Performed by: FAMILY MEDICINE

## 2019-02-26 PROCEDURE — 6360000004 HC RX CONTRAST MEDICATION: Performed by: FAMILY MEDICINE

## 2019-02-26 PROCEDURE — 70498 CT ANGIOGRAPHY NECK: CPT

## 2019-02-26 PROCEDURE — 85025 COMPLETE CBC W/AUTO DIFF WBC: CPT

## 2019-02-26 PROCEDURE — 72125 CT NECK SPINE W/O DYE: CPT

## 2019-02-26 RX ORDER — 0.9 % SODIUM CHLORIDE 0.9 %
1000 INTRAVENOUS SOLUTION INTRAVENOUS ONCE
Status: COMPLETED | OUTPATIENT
Start: 2019-02-26 | End: 2019-02-26

## 2019-02-26 RX ORDER — NAPROXEN 500 MG/1
500 TABLET ORAL 2 TIMES DAILY WITH MEALS
Qty: 14 TABLET | Refills: 0 | Status: SHIPPED | OUTPATIENT
Start: 2019-02-26 | End: 2019-08-19

## 2019-02-26 RX ORDER — DEXAMETHASONE SODIUM PHOSPHATE 4 MG/ML
10 INJECTION, SOLUTION INTRA-ARTICULAR; INTRALESIONAL; INTRAMUSCULAR; INTRAVENOUS; SOFT TISSUE ONCE
Status: COMPLETED | OUTPATIENT
Start: 2019-02-26 | End: 2019-02-26

## 2019-02-26 RX ORDER — KETOROLAC TROMETHAMINE 30 MG/ML
30 INJECTION, SOLUTION INTRAMUSCULAR; INTRAVENOUS ONCE
Status: COMPLETED | OUTPATIENT
Start: 2019-02-26 | End: 2019-02-26

## 2019-02-26 RX ORDER — CYCLOBENZAPRINE HCL 10 MG
10 TABLET ORAL EVERY EVENING
Qty: 7 TABLET | Refills: 0 | Status: SHIPPED | OUTPATIENT
Start: 2019-02-26 | End: 2019-03-05

## 2019-02-26 RX ORDER — CYCLOBENZAPRINE HCL 10 MG
10 TABLET ORAL ONCE
Status: COMPLETED | OUTPATIENT
Start: 2019-02-26 | End: 2019-02-26

## 2019-02-26 RX ORDER — PREDNISONE 10 MG/1
10 TABLET ORAL 2 TIMES DAILY
Qty: 14 TABLET | Refills: 0 | Status: SHIPPED | OUTPATIENT
Start: 2019-02-26 | End: 2019-03-05

## 2019-02-26 RX ADMIN — KETOROLAC TROMETHAMINE 30 MG: 30 INJECTION, SOLUTION INTRAMUSCULAR at 19:49

## 2019-02-26 RX ADMIN — IOVERSOL 100 ML: 678 INJECTION INTRA-ARTERIAL; INTRAVENOUS at 20:43

## 2019-02-26 RX ADMIN — CYCLOBENZAPRINE HYDROCHLORIDE 10 MG: 10 TABLET, FILM COATED ORAL at 19:49

## 2019-02-26 RX ADMIN — SODIUM CHLORIDE 1000 ML: 9 INJECTION, SOLUTION INTRAVENOUS at 19:49

## 2019-02-26 RX ADMIN — DEXAMETHASONE SODIUM PHOSPHATE 10 MG: 4 INJECTION, SOLUTION INTRA-ARTICULAR; INTRALESIONAL; INTRAMUSCULAR; INTRAVENOUS; SOFT TISSUE at 19:49

## 2019-02-26 ASSESSMENT — PAIN DESCRIPTION - ONSET: ONSET: ON-GOING

## 2019-02-26 ASSESSMENT — PAIN SCALES - GENERAL
PAINLEVEL_OUTOF10: 8
PAINLEVEL_OUTOF10: 4
PAINLEVEL_OUTOF10: 8
PAINLEVEL_OUTOF10: 0

## 2019-02-26 ASSESSMENT — PAIN DESCRIPTION - DESCRIPTORS: DESCRIPTORS: ACHING;DISCOMFORT

## 2019-02-26 ASSESSMENT — PAIN DESCRIPTION - LOCATION: LOCATION: SHOULDER;NECK

## 2019-02-26 ASSESSMENT — PAIN DESCRIPTION - PROGRESSION: CLINICAL_PROGRESSION: NOT CHANGED

## 2019-02-26 ASSESSMENT — PAIN DESCRIPTION - PAIN TYPE
TYPE: ACUTE PAIN
TYPE: ACUTE PAIN

## 2019-02-26 ASSESSMENT — PAIN DESCRIPTION - ORIENTATION: ORIENTATION: LEFT

## 2019-02-26 ASSESSMENT — PAIN DESCRIPTION - FREQUENCY: FREQUENCY: INTERMITTENT

## 2019-05-12 ENCOUNTER — HOSPITAL ENCOUNTER (EMERGENCY)
Age: 44
Discharge: HOME | End: 2019-05-12
Attending: EMERGENCY MEDICINE
Payer: OTHER GOVERNMENT

## 2019-05-12 VITALS
BODY MASS INDEX: 28.68 KG/M2 | DIASTOLIC BLOOD PRESSURE: 84 MMHG | SYSTOLIC BLOOD PRESSURE: 148 MMHG | HEART RATE: 74 BPM | RESPIRATION RATE: 16 BRPM | TEMPERATURE: 99.2 F | WEIGHT: 217.37 LBS | OXYGEN SATURATION: 97 %

## 2019-05-12 DIAGNOSIS — K91.89 POSTOPERATIVE HEMORRHAGE OF ANUS: Primary | ICD-10-CM

## 2019-05-12 DIAGNOSIS — K62.5 POSTOPERATIVE HEMORRHAGE OF ANUS: Primary | ICD-10-CM

## 2019-05-12 LAB
APTT: 32.9 SEC (ref 26–36)
BASOPHILS ABSOLUTE: 0 K/UL (ref 0–0.2)
BASOPHILS RELATIVE PERCENT: 0.7 %
EOSINOPHILS ABSOLUTE: 0.3 K/UL (ref 0–0.6)
EOSINOPHILS RELATIVE PERCENT: 7.2 %
HCT VFR BLD CALC: 33.2 % (ref 40.5–52.5)
HEMOGLOBIN: 11.1 G/DL (ref 13.5–17.5)
INR BLD: 1.16 (ref 0.86–1.14)
LYMPHOCYTES ABSOLUTE: 1.6 K/UL (ref 1–5.1)
LYMPHOCYTES RELATIVE PERCENT: 32.2 %
MCH RBC QN AUTO: 33.1 PG (ref 26–34)
MCHC RBC AUTO-ENTMCNC: 33.3 G/DL (ref 31–36)
MCV RBC AUTO: 99.6 FL (ref 80–100)
MONOCYTES ABSOLUTE: 0.7 K/UL (ref 0–1.3)
MONOCYTES RELATIVE PERCENT: 13.6 %
NEUTROPHILS ABSOLUTE: 2.3 K/UL (ref 1.7–7.7)
NEUTROPHILS RELATIVE PERCENT: 46.3 %
PDW BLD-RTO: 15.2 % (ref 12.4–15.4)
PLATELET # BLD: 228 K/UL (ref 135–450)
PMV BLD AUTO: 7.2 FL (ref 5–10.5)
PROTHROMBIN TIME: 13.2 SEC (ref 9.8–13)
RBC # BLD: 3.34 M/UL (ref 4.2–5.9)
WBC # BLD: 4.9 K/UL (ref 4–11)

## 2019-05-12 PROCEDURE — 99283 EMERGENCY DEPT VISIT LOW MDM: CPT

## 2019-05-12 PROCEDURE — 36415 COLL VENOUS BLD VENIPUNCTURE: CPT

## 2019-05-12 PROCEDURE — 85730 THROMBOPLASTIN TIME PARTIAL: CPT

## 2019-05-12 PROCEDURE — 85610 PROTHROMBIN TIME: CPT

## 2019-05-12 PROCEDURE — 85025 COMPLETE CBC W/AUTO DIFF WBC: CPT

## 2019-05-12 RX ORDER — OXYCODONE HYDROCHLORIDE 5 MG/1
5 TABLET ORAL EVERY 4 HOURS PRN
COMMUNITY
End: 2019-08-20

## 2019-05-12 ASSESSMENT — PAIN DESCRIPTION - FREQUENCY: FREQUENCY: CONTINUOUS

## 2019-05-12 ASSESSMENT — PAIN DESCRIPTION - PAIN TYPE
TYPE: ACUTE PAIN
TYPE: ACUTE PAIN

## 2019-05-12 ASSESSMENT — PAIN SCALES - GENERAL
PAINLEVEL_OUTOF10: 1
PAINLEVEL_OUTOF10: 8

## 2019-05-12 ASSESSMENT — PAIN DESCRIPTION - LOCATION: LOCATION: RECTUM

## 2019-05-12 ASSESSMENT — PAIN - FUNCTIONAL ASSESSMENT: PAIN_FUNCTIONAL_ASSESSMENT: 0-10

## 2019-05-12 ASSESSMENT — PAIN DESCRIPTION - DESCRIPTORS: DESCRIPTORS: THROBBING

## 2019-05-12 NOTE — ED NOTES
Patient remains in pain but states, \"heck of a lot better\" EMD aware, reviewed instructions with patient, verb under, discharged home     Emerson Meier, [address] [facility]  05/12/19 3305

## 2019-05-12 NOTE — ED PROVIDER NOTES
Triage Chief Complaint:   Other (\"had lesions removed from backside on Tuesday at [facility], cancerous warts\" states since surgery \"had sniffles, think I sneezed too hard\" obvious blood on back of pants, patient didn't call surgeon, denies dizziness or light headedness)    [place]:  Betty Barnes is a 37 y.o. male that presents for evaluation of postoperative bleeding. He had colorectal surgery on Tuesday for removal of condyloma acuminata. He states he was doing well but is had a mild amount of coughing and sneezing. He was snoozing tonight and felt warm fluid draining near the rectum. He was bleeding and states he is saturated through his pants. He placed a towel in between his clues to help staunch the bleeding but it has not stopped. He states he has no significant pain in the anal area and had no bleeding before a fit of sneezing around 11:00 last night. He states he is having a little bit lightheaded on the way here but that has resolved. He is not on any anticoagulants and has no known bleeding disorders. ROS:  At least 10 systems reviewed and otherwise acutely negative except as in the [address] Past Medical History:   Diagnosis Date    Anal lesion     Asthma     as a child    Condyloma     ED (erectile dysfunction)     Histoplasmosis     HIV disease ([facility] [address]      Past Surgical History:   Procedure Laterality Date    ANKLE SURGERY Right     OTHER SURGICAL HISTORY Right 01/23/2018    Right First dorsal extensor compartment tendon release.      Family History   Problem Relation Age of Onset    Stroke Mother     Diabetes Mother      Social History     Socioeconomic History    Marital status:      Spouse name: Not on file    Number of children: Not on file    Years of education: Not on file    Highest education level: Not on file   Occupational History    Occupation:    Social Needs    Financial resource strain: Not on file    Food insecurity:     Worry: Not on file Inability: Not on file    Transportation needs:     Medical: Not on file     Non-medical: Not on file   Tobacco Use    Smoking status: Current Every Day Smoker     Packs/day: 0.50     Years: 20.00     Pack years: 10.00     Types: Cigarettes    Smokeless tobacco: Never Used   Substance and Sexual Activity    Alcohol use: Yes     Comment: socially    Drug use: No     Types: Marijuana     Comment: last used-1/10/18    Sexual activity: Yes   Lifestyle    Physical activity:     Days per week: Not on file     Minutes per session: Not on file    Stress: Not on file   Relationships    Social connections:     Talks on phone: Not on file     Gets together: Not on file     Attends Mormon service: Not on file     Active member of club or organization: Not on file     Attends meetings of clubs or organizations: Not on file     Relationship status: Not on file    Intimate partner violence:     Fear of current or ex partner: Not on file     Emotionally abused: Not on file     Physically abused: Not on file     Forced sexual activity: Not on file   Other Topics Concern    Not on file   Social History Narrative    Not on file     No current facility-administered medications for this encounter. Current Outpatient Medications   Medication Sig Dispense Refill    oxyCODONE (ROXICODONE) 5 MG immediate release tablet Take 5 mg by mouth every 4 hours as needed for Pain.       naproxen (NAPROSYN) 500 MG tablet Take 1 tablet by mouth 2 times daily (with meals) for 7 days 14 tablet 0    abacavir-lamivudine (EPZICOM) 600-300 MG per tablet Take 1 tablet by mouth every evening       PREZCOBIX 800-150 MG TABS Take 1 tablet by mouth every evening        Allergies   Allergen Reactions    Amoxicillin Hives     sweats    Penicillins Hives     sweats               Nursing Notes Reviewed    Physical Exam:  Vitals:    05/12/19 0150   BP: (!) 163/90   Pulse: 76   Resp: 18   Temp: 99.2 °F (37.3 °C)   SpO2: 97%       GENERAL APPEARANCE: Awake and alert. Cooperative. No acute distress. HEAD: Normocephalic. Atraumatic. EYES: EOM's grossly intact. Sclera anicteric. ENT: Mucous membranes are moist. Tolerates saliva. No trismus. NECK: Supple. No meningismus. Trachea midline. HEART: RRR. Radial pulses 2+. LUNGS: Respirations unlabored. CTAB  ABDOMEN: Soft. Non-tender. No guarding or rebound. EXTREMITIES: No acute deformities. SKIN: Warm and dry. NEUROLOGICAL: No gross facial drooping. Moves all 4 extremities spontaneously. PSYCHIATRIC: Normal mood. : There is a large amount of clot in between the gluteal muscles and anal area. On the right side of the medial gluteal area near the anus there is a raw appearance to his surgical site. There is a small amount of brisk bleeding present that is nonpulsatile.     I have reviewed and interpreted all of the currently available lab results from this visit (if applicable):  Results for orders placed or performed during the hospital encounter of 05/12/19   CBC Auto Differential   Result Value Ref Range    WBC 4.9 4.0 - 11.0 K/uL    RBC 3.34 (L) 4.20 - 5.90 M/uL    Hemoglobin 11.1 (L) 13.5 - 17.5 g/dL    Hematocrit 33.2 (L) 40.5 - 52.5 %    MCV 99.6 80.0 - 100.0 fL    MCH 33.1 26.0 - 34.0 pg    MCHC 33.3 31.0 - 36.0 g/dL    RDW 15.2 12.4 - 15.4 %    Platelets 235 413 - 935 K/uL    MPV 7.2 5.0 - 10.5 fL    Neutrophils % 46.3 %    Lymphocytes % 32.2 %    Monocytes % 13.6 %    Eosinophils % 7.2 %    Basophils % 0.7 %    Neutrophils # 2.3 1.7 - 7.7 K/uL    Lymphocytes # 1.6 1.0 - 5.1 K/uL    Monocytes # 0.7 0.0 - 1.3 K/uL    Eosinophils # 0.3 0.0 - 0.6 K/uL    Basophils # 0.0 0.0 - 0.2 K/uL   APTT   Result Value Ref Range    aPTT 32.9 26.0 - 36.0 sec   Protime-INR   Result Value Ref Range    Protime 13.2 (H) 9.8 - 13.0 sec    INR 1.16 (H) 0.86 - 1.14        Radiographs (if obtained):  [] The following radiograph was interpreted by myself in the absence of a radiologist:  [] Radiologist's Report

## 2019-08-19 ENCOUNTER — APPOINTMENT (OUTPATIENT)
Dept: GENERAL RADIOLOGY | Age: 44
End: 2019-08-19
Payer: COMMERCIAL

## 2019-08-19 ENCOUNTER — HOSPITAL ENCOUNTER (EMERGENCY)
Age: 44
Discharge: HOME | End: 2019-08-19
Attending: EMERGENCY MEDICINE
Payer: COMMERCIAL

## 2019-08-19 VITALS
SYSTOLIC BLOOD PRESSURE: 196 MMHG | HEIGHT: 73 IN | DIASTOLIC BLOOD PRESSURE: 106 MMHG | RESPIRATION RATE: 12 BRPM | BODY MASS INDEX: 28.22 KG/M2 | TEMPERATURE: 98.7 F | HEART RATE: 65 BPM | OXYGEN SATURATION: 98 % | WEIGHT: 212.96 LBS

## 2019-08-19 DIAGNOSIS — I10 ESSENTIAL HYPERTENSION: ICD-10-CM

## 2019-08-19 DIAGNOSIS — M25.512 ACUTE PAIN OF LEFT SHOULDER: Primary | ICD-10-CM

## 2019-08-19 PROCEDURE — 73030 X-RAY EXAM OF SHOULDER: CPT

## 2019-08-19 PROCEDURE — 99283 EMERGENCY DEPT VISIT LOW MDM: CPT

## 2019-08-19 PROCEDURE — 6370000000 HC RX 637 (ALT 250 FOR IP): Performed by: EMERGENCY MEDICINE

## 2019-08-19 RX ORDER — NAPROXEN 250 MG/1
500 TABLET ORAL ONCE
Status: COMPLETED | OUTPATIENT
Start: 2019-08-19 | End: 2019-08-19

## 2019-08-19 RX ORDER — METOPROLOL SUCCINATE 100 MG/1
100 TABLET, EXTENDED RELEASE ORAL NIGHTLY
COMMUNITY
Start: 2019-05-06 | End: 2022-02-20 | Stop reason: ALTCHOICE

## 2019-08-19 RX ORDER — NAPROXEN 500 MG/1
500 TABLET ORAL 2 TIMES DAILY PRN
Qty: 60 TABLET | Refills: 0 | Status: SHIPPED | OUTPATIENT
Start: 2019-08-19 | End: 2019-08-20

## 2019-08-19 RX ADMIN — NAPROXEN 500 MG: 250 TABLET ORAL at 08:28

## 2019-08-19 ASSESSMENT — PAIN DESCRIPTION - PAIN TYPE
TYPE: ACUTE PAIN
TYPE: ACUTE PAIN

## 2019-08-19 ASSESSMENT — PAIN DESCRIPTION - LOCATION
LOCATION: SHOULDER
LOCATION: SHOULDER

## 2019-08-19 ASSESSMENT — PAIN - FUNCTIONAL ASSESSMENT: PAIN_FUNCTIONAL_ASSESSMENT: 0-10

## 2019-08-19 ASSESSMENT — PAIN DESCRIPTION - ORIENTATION
ORIENTATION: LEFT
ORIENTATION: LEFT

## 2019-08-19 ASSESSMENT — PAIN SCALES - GENERAL
PAINLEVEL_OUTOF10: 9
PAINLEVEL_OUTOF10: 8
PAINLEVEL_OUTOF10: 7

## 2019-08-19 ASSESSMENT — PAIN DESCRIPTION - DESCRIPTORS: DESCRIPTORS: THROBBING

## 2019-08-19 NOTE — ED PROVIDER NOTES
Occupation:    Social Needs    Financial resource strain: Not on file    Food insecurity:     Worry: Not on file     Inability: Not on file   [employer] needs:     Medical: Not on file     Non-medical: Not on file   Tobacco Use    Smoking status: Current Every Day Smoker     Packs/day: 0.50     Years: 20.00     Pack years: 10.00     Types: Cigarettes    Smokeless tobacco: Never Used   Substance and Sexual Activity    Alcohol use: Yes     Comment: socially    Drug use: No     Types: Marijuana     Comment: last used-1/10/18    Sexual activity: Yes   Lifestyle    Physical activity:     Days per week: Not on file     Minutes per session: Not on file    Stress: Not on file   Relationships    Social connections:     Talks on phone: Not on file     Gets together: Not on file     Attends Taoist service: Not on file     Active member of club or organization: Not on file     Attends meetings of clubs or organizations: Not on file     Relationship status: Not on file    Intimate partner violence:     Fear of current or ex partner: Not on file     Emotionally abused: Not on file     Physically abused: Not on file     Forced sexual activity: Not on file   Other Topics Concern    Not on file   Social History Narrative    Not on file     No current facility-administered medications for this encounter. Current Outpatient Medications   Medication Sig Dispense Refill    metoprolol succinate (TOPROL XL) 100 MG extended release tablet Take 100 mg by mouth      naproxen (NAPROSYN) 500 MG tablet Take 1 tablet by mouth 2 times daily as needed for Pain 60 tablet 0    oxyCODONE (ROXICODONE) 5 MG immediate release tablet Take 5 mg by mouth every 4 hours as needed for Pain.       abacavir-lamivudine (EPZICOM) 600-300 MG per tablet Take 1 tablet by mouth every evening       PREZCOBIX 800-150 MG TABS Take 1 tablet by mouth every evening        Allergies   Allergen Reactions    Amoxicillin Hives     sweats

## 2019-08-20 ENCOUNTER — APPOINTMENT (OUTPATIENT)
Dept: GENERAL RADIOLOGY | Age: 44
End: 2019-08-20
Payer: COMMERCIAL

## 2019-08-20 ENCOUNTER — HOSPITAL ENCOUNTER (EMERGENCY)
Age: 44
Discharge: HOME | End: 2019-08-20
Payer: COMMERCIAL

## 2019-08-20 VITALS
TEMPERATURE: 98.4 F | DIASTOLIC BLOOD PRESSURE: 89 MMHG | RESPIRATION RATE: 16 BRPM | HEIGHT: 73 IN | WEIGHT: 219.14 LBS | BODY MASS INDEX: 29.04 KG/M2 | SYSTOLIC BLOOD PRESSURE: 189 MMHG | OXYGEN SATURATION: 99 % | HEART RATE: 74 BPM

## 2019-08-20 DIAGNOSIS — S82.892A AVULSION FRACTURE OF ANKLE, LEFT, CLOSED, INITIAL ENCOUNTER: Primary | ICD-10-CM

## 2019-08-20 PROCEDURE — 6370000000 HC RX 637 (ALT 250 FOR IP): Performed by: PHYSICIAN ASSISTANT

## 2019-08-20 PROCEDURE — 73610 X-RAY EXAM OF ANKLE: CPT

## 2019-08-20 PROCEDURE — 4500000023 HC ED LEVEL 3 PROCEDURE

## 2019-08-20 PROCEDURE — 99283 EMERGENCY DEPT VISIT LOW MDM: CPT

## 2019-08-20 RX ORDER — IBUPROFEN 400 MG/1
800 TABLET ORAL ONCE
Status: COMPLETED | OUTPATIENT
Start: 2019-08-20 | End: 2019-08-20

## 2019-08-20 RX ADMIN — IBUPROFEN 800 MG: 400 TABLET ORAL at 21:39

## 2019-08-20 ASSESSMENT — ENCOUNTER SYMPTOMS
CHEST TIGHTNESS: 0
SHORTNESS OF BREATH: 0
COLOR CHANGE: 0
VOMITING: 0

## 2019-08-20 ASSESSMENT — PAIN SCALES - GENERAL
PAINLEVEL_OUTOF10: 8
PAINLEVEL_OUTOF10: 8

## 2019-08-20 ASSESSMENT — PAIN DESCRIPTION - DESCRIPTORS: DESCRIPTORS: THROBBING

## 2019-08-20 ASSESSMENT — PAIN DESCRIPTION - PAIN TYPE: TYPE: ACUTE PAIN

## 2019-08-20 ASSESSMENT — PAIN DESCRIPTION - ORIENTATION: ORIENTATION: LEFT

## 2019-08-20 ASSESSMENT — PAIN DESCRIPTION - LOCATION: LOCATION: ANKLE

## 2019-08-20 ASSESSMENT — PAIN DESCRIPTION - FREQUENCY: FREQUENCY: CONTINUOUS

## 2019-08-21 ENCOUNTER — TELEPHONE (OUTPATIENT)
Dept: ORTHOPEDIC SURGERY | Age: 44
End: 2019-08-21

## 2019-08-21 NOTE — ED PROVIDER NOTES
film images such as CT, Ultrasound and MRI are read by the radiologist. Plain radiographic images are visualized and preliminarilyinterpreted by the emergency physician with the below findings:    Interpretation per the Radiologist below,if available at the time of this note:    XR ANKLE LEFT (MIN 3 VIEWS)   Final Result   1. Acute avulsion injury along the inferior margin of the left lateral   malleolus. 2. Likely remote avulsion injury along the inferior margin of the left medial   malleolus. 3. Mild soft tissue swelling about the left ankle. 4. Small to moderate left ankle joint effusion. LABS:  Labs Reviewed - No data to display    All other labs were within normal range or not returned as of this dictation. EMERGENCY DEPARTMENT COURSE and DIFFERENTIAL DIAGNOSIS/MDM:   Vitals:    Vitals:    08/20/19 2111   BP: (!) 189/89   Pulse: 74   Resp: 16   Temp: 98.4 °F (36.9 °C)   TempSrc: Oral   SpO2: 99%   Weight: 219 lb 2.2 oz (99.4 kg)   Height: 6' 1\" (1.854 m)       MDM      The patient tolerated their visit well. I have discussed the findings of today's workup with the patient and addressed the patient's questions and concerns. Important warning signs as well as new or worsening symptoms which would necessitate immediate return to the ED were discussed. The plan is to discharge from the ED at this time, and the patient is in stable condition. The patient acknowledged understanding is agreeable with this plan. CONSULTS:  None    PROCEDURES:  Procedures     SPLINT PLACEMENT  A posterior OCL splint was applied to the LLE by the emergency department technician. I evaluated the splint after it was applied. The splint is in good position. The patient's extremity is neurovascularly intact after the splint placement. FINAL IMPRESSION      1.  Avulsion fracture of ankle, left, closed, initial encounter          DISPOSITION/PLAN   [unfilled]    PATIENT REFERRED TO:  [facility]

## 2019-08-21 NOTE — ED TRIAGE NOTES
Pt states he twisted his left ankle while mowing the lawn at 1000. Swelling noted. +pulses.   Ice pack given

## 2019-08-23 ENCOUNTER — OFFICE VISIT (OUTPATIENT)
Dept: ORTHOPEDIC SURGERY | Age: 44
End: 2019-08-23
Payer: COMMERCIAL

## 2019-08-23 ENCOUNTER — TELEPHONE (OUTPATIENT)
Dept: ORTHOPEDIC SURGERY | Age: 44
End: 2019-08-23

## 2019-08-23 VITALS
DIASTOLIC BLOOD PRESSURE: 102 MMHG | SYSTOLIC BLOOD PRESSURE: 175 MMHG | HEIGHT: 73 IN | BODY MASS INDEX: 29.03 KG/M2 | HEART RATE: 67 BPM | RESPIRATION RATE: 16 BRPM | WEIGHT: 219 LBS

## 2019-08-23 DIAGNOSIS — S82.842A: Primary | ICD-10-CM

## 2019-08-23 PROCEDURE — L4360 PNEUMAT WALKING BOOT PRE CST: HCPCS | Performed by: ORTHOPAEDIC SURGERY

## 2019-08-23 PROCEDURE — G8427 DOCREV CUR MEDS BY ELIG CLIN: HCPCS | Performed by: ORTHOPAEDIC SURGERY

## 2019-08-23 PROCEDURE — 27808 TREATMENT OF ANKLE FRACTURE: CPT | Performed by: ORTHOPAEDIC SURGERY

## 2019-08-23 PROCEDURE — 4004F PT TOBACCO SCREEN RCVD TLK: CPT | Performed by: ORTHOPAEDIC SURGERY

## 2019-08-23 PROCEDURE — 99213 OFFICE O/P EST LOW 20 MIN: CPT | Performed by: ORTHOPAEDIC SURGERY

## 2019-08-23 PROCEDURE — G8419 CALC BMI OUT NRM PARAM NOF/U: HCPCS | Performed by: ORTHOPAEDIC SURGERY

## 2019-09-01 PROBLEM — S82.843A BIMALLEOLAR AVULSION FRACTURE: Status: ACTIVE | Noted: 2019-09-01

## 2019-09-23 ENCOUNTER — APPOINTMENT (OUTPATIENT)
Dept: GENERAL RADIOLOGY | Age: 44
End: 2019-09-23
Payer: OTHER GOVERNMENT

## 2019-09-23 ENCOUNTER — HOSPITAL ENCOUNTER (EMERGENCY)
Age: 44
Discharge: HOME | End: 2019-09-23
Payer: OTHER GOVERNMENT

## 2019-09-23 VITALS
OXYGEN SATURATION: 100 % | WEIGHT: 212.3 LBS | HEIGHT: 71 IN | TEMPERATURE: 98.5 F | DIASTOLIC BLOOD PRESSURE: 97 MMHG | BODY MASS INDEX: 29.72 KG/M2 | HEART RATE: 69 BPM | RESPIRATION RATE: 16 BRPM | SYSTOLIC BLOOD PRESSURE: 157 MMHG

## 2019-09-23 DIAGNOSIS — M25.532 WRIST PAIN, ACUTE, LEFT: Primary | ICD-10-CM

## 2019-09-23 PROCEDURE — 73110 X-RAY EXAM OF WRIST: CPT

## 2019-09-23 PROCEDURE — 99283 EMERGENCY DEPT VISIT LOW MDM: CPT

## 2019-09-23 PROCEDURE — 6370000000 HC RX 637 (ALT 250 FOR IP): Performed by: NURSE PRACTITIONER

## 2019-09-23 RX ORDER — ACETAMINOPHEN 500 MG
500 TABLET ORAL 4 TIMES DAILY PRN
Qty: 60 TABLET | Refills: 0 | Status: SHIPPED | OUTPATIENT
Start: 2019-09-23 | End: 2019-10-11

## 2019-09-23 RX ORDER — NAPROXEN 500 MG/1
500 TABLET ORAL 2 TIMES DAILY
Qty: 60 TABLET | Refills: 0 | Status: SHIPPED | OUTPATIENT
Start: 2019-09-23 | End: 2019-10-11

## 2019-09-23 RX ORDER — NAPROXEN 250 MG/1
500 TABLET ORAL ONCE
Status: COMPLETED | OUTPATIENT
Start: 2019-09-23 | End: 2019-09-23

## 2019-09-23 RX ADMIN — NAPROXEN 500 MG: 250 TABLET ORAL at 14:26

## 2019-09-23 ASSESSMENT — PAIN DESCRIPTION - LOCATION: LOCATION: WRIST

## 2019-09-23 ASSESSMENT — PAIN SCALES - GENERAL
PAINLEVEL_OUTOF10: 10

## 2019-09-23 ASSESSMENT — PAIN DESCRIPTION - ORIENTATION: ORIENTATION: LEFT

## 2019-09-23 ASSESSMENT — PAIN DESCRIPTION - DESCRIPTORS: DESCRIPTORS: ACHING

## 2019-09-23 ASSESSMENT — PAIN - FUNCTIONAL ASSESSMENT: PAIN_FUNCTIONAL_ASSESSMENT: 0-10

## 2019-09-23 NOTE — ED PROVIDER NOTES
[address] [facility] [address]  Dept: [phone]  Loc: [address] ENCOUNTER        This patient was not seen or evaluated by the attending physician. I evaluated this patient, the attending physician was available for consultation. CHIEF COMPLAINT    Chief Complaint   Patient presents with    Wrist Pain     L wrist pain x1wk, pain 9/10, no injury noted. pt states pain feels similar to R wrist pain resolved by tendon repair in Feb 2018 by Dr. Michaela Suresh. HPI    Beto Mejia Sr is a 37 y.o. male who presents with left wrist pain. The onset was 1 week ago. The duration has been constant since the onset. The quality of the pain is sharp and the severity is 40/24 with certain movements; no pain at rest. The patient has no other associated injury. The context is denies any injury or trauma event. States that he had tendinitis of his right wrist, had surgery with Dr. Michaela Suresh which helped relieve the pain. He states that the pain in his left wrist feels similar to this. He states that he came to the ED for further evaluation treatment. REVIEW OF SYSTEMS    Skin: No lacerations or puncture wounds  Musculoskeletal: see HPI, no other joint or bony injury or pain  Neurologic: No loss of consciousness, no head injury, no paresthesias or focal distal extremity weakness  All other systems reviewed and are negative. PAST MEDICAL & SURGICAL HISTORY    Past Medical History:   Diagnosis Date    Anal lesion     Asthma     as a child    Condyloma     ED (erectile dysfunction)     Histoplasmosis     HIV disease ([facility] [address]      Past Surgical History:   Procedure Laterality Date    ANKLE SURGERY Right     OTHER SURGICAL HISTORY Right 01/23/2018    Right First dorsal extensor compartment tendon release.        CURRENT MEDICATIONS  (may include discharge medications prescribed in the ED)  Current Outpatient Rx EMR)    (Please note that this note was completed with a voice recognition program.  Every attempt was made to edit the dictations, but inevitably there remain words that are mis-transcribed.)        RACHELLE Rowland - CNP  09/23/19 6036

## 2019-09-23 NOTE — ED NOTES
Reviewed AVS and discharge home care instructions with patient. Pt verbalized understanding and had no questions at this time. Pt sent home with prescription x2.      Rola Montero RN  09/23/19 9894

## 2019-09-30 ENCOUNTER — OFFICE VISIT (OUTPATIENT)
Dept: ORTHOPEDIC SURGERY | Age: 44
End: 2019-09-30
Payer: OTHER GOVERNMENT

## 2019-09-30 VITALS
HEART RATE: 69 BPM | WEIGHT: 215 LBS | BODY MASS INDEX: 28.49 KG/M2 | SYSTOLIC BLOOD PRESSURE: 139 MMHG | DIASTOLIC BLOOD PRESSURE: 89 MMHG | HEIGHT: 73 IN

## 2019-09-30 DIAGNOSIS — M65.4 DE QUERVAIN'S DISEASE (RADIAL STYLOID TENOSYNOVITIS): Primary | ICD-10-CM

## 2019-09-30 DIAGNOSIS — M65.4 RADIAL STYLOID TENOSYNOVITIS (DE QUERVAIN): ICD-10-CM

## 2019-09-30 PROCEDURE — 99213 OFFICE O/P EST LOW 20 MIN: CPT | Performed by: PHYSICIAN ASSISTANT

## 2019-09-30 PROCEDURE — G8428 CUR MEDS NOT DOCUMENT: HCPCS | Performed by: PHYSICIAN ASSISTANT

## 2019-09-30 PROCEDURE — 4004F PT TOBACCO SCREEN RCVD TLK: CPT | Performed by: PHYSICIAN ASSISTANT

## 2019-09-30 PROCEDURE — G8419 CALC BMI OUT NRM PARAM NOF/U: HCPCS | Performed by: PHYSICIAN ASSISTANT

## 2019-09-30 PROCEDURE — L3908 WHO COCK-UP NONMOLDE PRE OTS: HCPCS | Performed by: PHYSICIAN ASSISTANT

## 2019-09-30 NOTE — PATIENT INSTRUCTIONS
Pre-Operative Instructions    1. The night before your surgery, unless otherwise instructed, do not eat any food, drink any liquids, chew gum or mints after midnight. Abstain from alcohol for 24 hours prior to surgery. 2. You will be contacted by the Hospital the working day prior to your procedure to confirm your arrival time. 3. Patients under 25years of age must have a parent or legal guardian present to sign their consent and discharge paperwork. 4. On the day of surgery,  you will be seen pre-operatively by an anesthesiologist.     5. If you are having hand surgery, it is recommended that nail polish and acrylic nails be removed prior to surgery if possible. 6. Please bring cases for glasses, contact lenses, hearing aids or dentures. They will likely be removed prior to surgery. 7. Wear casual, loose-fitting and comfortable clothing. Consider that you may have a large dressing to fit under your clothing after surgery. 9. Please do not bring valuables such as jewelry or large sums of cash to the hospital. Remove all body piercings before coming to the hospital. Cisco Bernard may not  wear any rings on the hand if you are having surgery on that hand, wrist or elbow. 10. Do not smoke or chew tobacco before your surgery. [address] [place] [address] and surgery facilities are smoke-free environments. Smoking is not permitted anywhere on campus. 11. Be sure to follow any additional instructions from your physician. If the above conditions are not met, your surgery may be cancelled and rescheduled for another day. Should you develop any change in your health such as fever, cough, sore throat, cold, flu, or infection, or if you have any questions regarding your Pre-admission or surgery, please contact [address] - Surgery Scheduling at [phone], Monday through Friday, 9 a.m. to 5 p.m.

## 2019-09-30 NOTE — PROGRESS NOTES
capillary refill and good color bilaterally   Swelling is mild along the radial margin of the wrist on the Left, normal on the Right  Muscular strength is clinically appropriate bilaterally. Examination of the left first dorsal extensor compartment of the wrist demonstrates little thickening and fullness. There is mild tenderness to palpation over the extensor tendons. There is mild pain with resisted thumb extension, and Finklestein's maneuver is positive right side. Wrist range of motion is accompanied by mild pain in dorsiflexion greater than palmar flexion. Examination for Stenosing Tenosynovitis demonstrates no evidence of tenderness, thickening or nodularity at the A-1 pulleys of the digits bilaterally. There no palpable triggering or any finger or thumb. Impression:  Mr. Alysha Martinez is showing clinical evidence of DeQuervain's Tendonitis and presents requesting further treatment. Plan:  I have had a thorough discussion with Mr. Alysha Martinez regarding the treatment options available for his initially presenting  DeQuervain's Tenosynovitis, which is causing him significant  limitations. I have outlined for Mr. Alysha Martinez the benefits and consequences of the various treatment modalities, including the fact that surgical treatment is the only modality which is reasonably expected to provide long lasting or permanent resolution of his symptoms. Based upon our current discussion and a reasonable understating of the options available to him, Mr. Alysha Martinez has selected to proceed with surgical DeQuervain's Tendonitis release. I have discussed the details of the surgical procedure, the pre, emma and postoperative concerns and the appropriate expectations after surgery with Mr. Alysha Martinez today.  He was given the opportunity to ask questions, voiced an understanding of the procedure, and he did wish to proceed with Left 1st Dorsal Extensor Compartment tendon

## 2019-09-30 NOTE — LETTER
? Anesthetic and/or Medical Risks  ? 4. I have also been informed by the informing physician that there are other risks from both known and unknown causes that are attendant to the performance of any surgical procedure. I am aware that the practice of medicine and surgery is not an exact science, and that no guarantees have been made to me concerning the results of the operation and/or procedure(s). 5. I   CONSENT / REFUSE CONSENT  (strike the phrase that does not apply) to the taking of photographs before, during and/or after the operation or procedure for scientific/educational purposes. 6. I consent to the administration of anesthesia and to the use of such anesthetics as may be deemed advisable by the anesthesiologist who has been engaged by me or my physician. 7. I certify that I have read and understand the above consent to operation and/or other procedure(s); that the explanations therein referred to were made to me by the informing physician in advance of my signing this consent; that all blanks or statements requiring insertion or completion were filled in and inapplicable paragraphs, if any, were stricken before I signed; and that all questions asked by me about the operation and/or procedure(s) which I have consented to have been fully answered in a satisfactory manner.                                 _______________________           9/30/19                              Witness     Signature Of Patient         Ashly Elliott                                                 Informing Physician                                           Signature of Informing Physician                              If patient is unable to sign or is a minor, complete one of the following:    (A)  Patient is a minor   years of age. (B)  Patient is unable to sign because:            The undersigned represents that he or she is duly authorized to execute this consent for and on behalf of the above named patient. Witness               o  Parent  o  Guardian   o  Spouse       o  Other (specify)                                    Patient Name: Marcus Banegas Sr  Patient YOB: 1975    Dr. Silver Barnes' Return To Work Policy  Regarding your ability to return to work after surgery or injury, Dr. Silver Barnes will not state that any patient is off of work or cannot work at all. He will place you on restrictions after your surgical procedure or injury. This means that you will be allowed to return to work the day after your office visit or surgery with restrictions. Depending on the details of your particular situation, Dr. Silver Barnes may state that you will have either light use or no use of your hand for a specific number of weeks. It is your obligation to communicate with your employer regarding your restrictions. It is your employer's decision as to whether they will accommodate your restrictions (i.e. allow you to come to work in your restricted capacity) or to not allow you to return to work under your restrictions. Dr. Silver Barnes does not participate in making this decision and cannot influence your employer regarding their decision. If you do not communicate your restrictions to your employer, or if you do not present to work as you are scheduled to, Dr. Silver Barnes will not provide an 'excuse' to explain your absence. A doctors note, or official forms (BWC, FMLA, etc.) will be filled out, upon request, to indicate your date of surgery and your restrictions as stated above. Dr. Silver Barnes' Narcotic Policy  Patients will only be prescribed narcotics after surgical procedures or significant injury. Not all procedures cause pain great enough to require Narcotics and thus, not all patients will receive prescriptions after surgical procedures or injuries.   Narcotics are never prescribed for

## 2019-10-11 RX ORDER — LISINOPRIL 10 MG/1
10 TABLET ORAL NIGHTLY
COMMUNITY
End: 2022-02-20 | Stop reason: ALTCHOICE

## 2019-10-14 ENCOUNTER — ANESTHESIA EVENT (OUTPATIENT)
Dept: OPERATING ROOM | Age: 44
End: 2019-10-14
Payer: OTHER GOVERNMENT

## 2019-10-15 ENCOUNTER — ANESTHESIA (OUTPATIENT)
Dept: OPERATING ROOM | Age: 44
End: 2019-10-15
Payer: OTHER GOVERNMENT

## 2019-10-15 ENCOUNTER — HOSPITAL ENCOUNTER (OUTPATIENT)
Age: 44
Setting detail: OUTPATIENT SURGERY
Discharge: HOME | End: 2019-10-15
Attending: ORTHOPAEDIC SURGERY | Admitting: ORTHOPAEDIC SURGERY
Payer: OTHER GOVERNMENT

## 2019-10-15 VITALS
SYSTOLIC BLOOD PRESSURE: 163 MMHG | WEIGHT: 216 LBS | DIASTOLIC BLOOD PRESSURE: 90 MMHG | HEIGHT: 73 IN | OXYGEN SATURATION: 98 % | RESPIRATION RATE: 16 BRPM | HEART RATE: 56 BPM | TEMPERATURE: 97.4 F | BODY MASS INDEX: 28.63 KG/M2

## 2019-10-15 VITALS — OXYGEN SATURATION: 97 % | DIASTOLIC BLOOD PRESSURE: 82 MMHG | SYSTOLIC BLOOD PRESSURE: 154 MMHG

## 2019-10-15 PROCEDURE — 6360000002 HC RX W HCPCS: Performed by: ANESTHESIOLOGY

## 2019-10-15 PROCEDURE — 2709999900 HC NON-CHARGEABLE SUPPLY: Performed by: ORTHOPAEDIC SURGERY

## 2019-10-15 PROCEDURE — 2500000003 HC RX 250 WO HCPCS: Performed by: NURSE ANESTHETIST, CERTIFIED REGISTERED

## 2019-10-15 PROCEDURE — 3700000001 HC ADD 15 MINUTES (ANESTHESIA): Performed by: ORTHOPAEDIC SURGERY

## 2019-10-15 PROCEDURE — 6370000000 HC RX 637 (ALT 250 FOR IP): Performed by: ANESTHESIOLOGY

## 2019-10-15 PROCEDURE — 7100000001 HC PACU RECOVERY - ADDTL 15 MIN: Performed by: ORTHOPAEDIC SURGERY

## 2019-10-15 PROCEDURE — 7100000000 HC PACU RECOVERY - FIRST 15 MIN: Performed by: ORTHOPAEDIC SURGERY

## 2019-10-15 PROCEDURE — 2580000003 HC RX 258: Performed by: ORTHOPAEDIC SURGERY

## 2019-10-15 PROCEDURE — 6360000002 HC RX W HCPCS: Performed by: NURSE ANESTHETIST, CERTIFIED REGISTERED

## 2019-10-15 PROCEDURE — 2580000003 HC RX 258: Performed by: ANESTHESIOLOGY

## 2019-10-15 PROCEDURE — 7100000010 HC PHASE II RECOVERY - FIRST 15 MIN: Performed by: ORTHOPAEDIC SURGERY

## 2019-10-15 PROCEDURE — 2500000003 HC RX 250 WO HCPCS: Performed by: ORTHOPAEDIC SURGERY

## 2019-10-15 PROCEDURE — 3600000005 HC SURGERY LEVEL 5 BASE: Performed by: ORTHOPAEDIC SURGERY

## 2019-10-15 PROCEDURE — 3700000000 HC ANESTHESIA ATTENDED CARE: Performed by: ORTHOPAEDIC SURGERY

## 2019-10-15 PROCEDURE — 7100000011 HC PHASE II RECOVERY - ADDTL 15 MIN: Performed by: ORTHOPAEDIC SURGERY

## 2019-10-15 PROCEDURE — 3600000015 HC SURGERY LEVEL 5 ADDTL 15MIN: Performed by: ORTHOPAEDIC SURGERY

## 2019-10-15 RX ORDER — SODIUM CHLORIDE 0.9 % (FLUSH) 0.9 %
10 SYRINGE (ML) INJECTION PRN
Status: DISCONTINUED | OUTPATIENT
Start: 2019-10-15 | End: 2019-10-15 | Stop reason: HOSPADM

## 2019-10-15 RX ORDER — LIDOCAINE HYDROCHLORIDE 20 MG/ML
INJECTION, SOLUTION EPIDURAL; INFILTRATION; INTRACAUDAL; PERINEURAL PRN
Status: DISCONTINUED | OUTPATIENT
Start: 2019-10-15 | End: 2019-10-15 | Stop reason: SDUPTHER

## 2019-10-15 RX ORDER — MORPHINE SULFATE 2 MG/ML
2 INJECTION, SOLUTION INTRAMUSCULAR; INTRAVENOUS EVERY 5 MIN PRN
Status: DISCONTINUED | OUTPATIENT
Start: 2019-10-15 | End: 2019-10-15 | Stop reason: HOSPADM

## 2019-10-15 RX ORDER — HYDRALAZINE HYDROCHLORIDE 20 MG/ML
5 INJECTION INTRAMUSCULAR; INTRAVENOUS
Status: DISCONTINUED | OUTPATIENT
Start: 2019-10-15 | End: 2019-10-15 | Stop reason: HOSPADM

## 2019-10-15 RX ORDER — ONDANSETRON 2 MG/ML
4 INJECTION INTRAMUSCULAR; INTRAVENOUS
Status: DISCONTINUED | OUTPATIENT
Start: 2019-10-15 | End: 2019-10-15 | Stop reason: HOSPADM

## 2019-10-15 RX ORDER — SODIUM CHLORIDE 0.9 % (FLUSH) 0.9 %
10 SYRINGE (ML) INJECTION EVERY 12 HOURS SCHEDULED
Status: DISCONTINUED | OUTPATIENT
Start: 2019-10-15 | End: 2019-10-15 | Stop reason: HOSPADM

## 2019-10-15 RX ORDER — LABETALOL HYDROCHLORIDE 5 MG/ML
5 INJECTION, SOLUTION INTRAVENOUS EVERY 10 MIN PRN
Status: DISCONTINUED | OUTPATIENT
Start: 2019-10-15 | End: 2019-10-15 | Stop reason: HOSPADM

## 2019-10-15 RX ORDER — MEPERIDINE HYDROCHLORIDE 25 MG/ML
12.5 INJECTION INTRAMUSCULAR; INTRAVENOUS; SUBCUTANEOUS EVERY 5 MIN PRN
Status: DISCONTINUED | OUTPATIENT
Start: 2019-10-15 | End: 2019-10-15 | Stop reason: HOSPADM

## 2019-10-15 RX ORDER — OXYCODONE HYDROCHLORIDE AND ACETAMINOPHEN 5; 325 MG/1; MG/1
2 TABLET ORAL PRN
Status: COMPLETED | OUTPATIENT
Start: 2019-10-15 | End: 2019-10-15

## 2019-10-15 RX ORDER — MAGNESIUM HYDROXIDE 1200 MG/15ML
LIQUID ORAL CONTINUOUS PRN
Status: COMPLETED | OUTPATIENT
Start: 2019-10-15 | End: 2019-10-15

## 2019-10-15 RX ORDER — MORPHINE SULFATE 2 MG/ML
1 INJECTION, SOLUTION INTRAMUSCULAR; INTRAVENOUS EVERY 5 MIN PRN
Status: DISCONTINUED | OUTPATIENT
Start: 2019-10-15 | End: 2019-10-15 | Stop reason: HOSPADM

## 2019-10-15 RX ORDER — PROPOFOL 10 MG/ML
INJECTION, EMULSION INTRAVENOUS CONTINUOUS PRN
Status: DISCONTINUED | OUTPATIENT
Start: 2019-10-15 | End: 2019-10-15 | Stop reason: SDUPTHER

## 2019-10-15 RX ORDER — OXYCODONE HYDROCHLORIDE AND ACETAMINOPHEN 5; 325 MG/1; MG/1
1 TABLET ORAL PRN
Status: COMPLETED | OUTPATIENT
Start: 2019-10-15 | End: 2019-10-15

## 2019-10-15 RX ORDER — SODIUM CHLORIDE 9 MG/ML
INJECTION, SOLUTION INTRAVENOUS CONTINUOUS
Status: DISCONTINUED | OUTPATIENT
Start: 2019-10-15 | End: 2019-10-15 | Stop reason: HOSPADM

## 2019-10-15 RX ORDER — PROPOFOL 10 MG/ML
INJECTION, EMULSION INTRAVENOUS PRN
Status: DISCONTINUED | OUTPATIENT
Start: 2019-10-15 | End: 2019-10-15 | Stop reason: SDUPTHER

## 2019-10-15 RX ADMIN — PROPOFOL 180 MCG/KG/MIN: 10 INJECTION, EMULSION INTRAVENOUS at 12:57

## 2019-10-15 RX ADMIN — LIDOCAINE HYDROCHLORIDE 100 MG: 20 INJECTION, SOLUTION EPIDURAL; INFILTRATION; INTRACAUDAL; PERINEURAL at 12:57

## 2019-10-15 RX ADMIN — SODIUM CHLORIDE: 9 INJECTION, SOLUTION INTRAVENOUS at 12:12

## 2019-10-15 RX ADMIN — PROPOFOL 200 MG: 10 INJECTION, EMULSION INTRAVENOUS at 12:57

## 2019-10-15 RX ADMIN — HYDROMORPHONE HYDROCHLORIDE 0.5 MG: 1 INJECTION, SOLUTION INTRAMUSCULAR; INTRAVENOUS; SUBCUTANEOUS at 13:43

## 2019-10-15 RX ADMIN — OXYCODONE HYDROCHLORIDE AND ACETAMINOPHEN 2 TABLET: 5; 325 TABLET ORAL at 14:27

## 2019-10-15 RX ADMIN — SODIUM CHLORIDE: 9 INJECTION, SOLUTION INTRAVENOUS at 12:52

## 2019-10-15 RX ADMIN — HYDROMORPHONE HYDROCHLORIDE 0.5 MG: 1 INJECTION, SOLUTION INTRAMUSCULAR; INTRAVENOUS; SUBCUTANEOUS at 13:35

## 2019-10-15 ASSESSMENT — PULMONARY FUNCTION TESTS
PIF_VALUE: 0

## 2019-10-15 ASSESSMENT — PAIN DESCRIPTION - PROGRESSION
CLINICAL_PROGRESSION: GRADUALLY IMPROVING
CLINICAL_PROGRESSION: NOT CHANGED
CLINICAL_PROGRESSION: GRADUALLY IMPROVING

## 2019-10-15 ASSESSMENT — PAIN - FUNCTIONAL ASSESSMENT
PAIN_FUNCTIONAL_ASSESSMENT: ACTIVITIES ARE NOT PREVENTED
PAIN_FUNCTIONAL_ASSESSMENT: 0-10
PAIN_FUNCTIONAL_ASSESSMENT: ACTIVITIES ARE NOT PREVENTED

## 2019-10-15 ASSESSMENT — ENCOUNTER SYMPTOMS: SHORTNESS OF BREATH: 0

## 2019-10-15 ASSESSMENT — PAIN DESCRIPTION - FREQUENCY
FREQUENCY: CONTINUOUS

## 2019-10-15 ASSESSMENT — PAIN DESCRIPTION - ORIENTATION
ORIENTATION: LEFT

## 2019-10-15 ASSESSMENT — PAIN DESCRIPTION - LOCATION
LOCATION: HAND
LOCATION: WRIST
LOCATION: WRIST
LOCATION: HAND
LOCATION: WRIST

## 2019-10-15 ASSESSMENT — PAIN SCALES - GENERAL
PAINLEVEL_OUTOF10: 7
PAINLEVEL_OUTOF10: 10
PAINLEVEL_OUTOF10: 8
PAINLEVEL_OUTOF10: 7
PAINLEVEL_OUTOF10: 9

## 2019-10-15 ASSESSMENT — PAIN DESCRIPTION - DESCRIPTORS
DESCRIPTORS: SHARP
DESCRIPTORS: SHARP
DESCRIPTORS: ACHING
DESCRIPTORS: SHARP

## 2019-10-15 ASSESSMENT — PAIN DESCRIPTION - PAIN TYPE
TYPE: SURGICAL PAIN

## 2019-10-15 ASSESSMENT — PAIN DESCRIPTION - ONSET
ONSET: ON-GOING

## 2019-10-25 ENCOUNTER — OFFICE VISIT (OUTPATIENT)
Dept: ORTHOPEDIC SURGERY | Age: 44
End: 2019-10-25

## 2019-10-25 VITALS — BODY MASS INDEX: 28.63 KG/M2 | RESPIRATION RATE: 18 BRPM | WEIGHT: 216.05 LBS | HEIGHT: 73 IN

## 2019-10-25 DIAGNOSIS — M65.4 RADIAL STYLOID TENOSYNOVITIS (DE QUERVAIN): Primary | ICD-10-CM

## 2019-10-25 PROCEDURE — 99024 POSTOP FOLLOW-UP VISIT: CPT | Performed by: PHYSICIAN ASSISTANT

## 2019-10-25 PROCEDURE — APPNB15 APP NON BILLABLE TIME 0-15 MINS: Performed by: PHYSICIAN ASSISTANT

## 2020-09-14 ENCOUNTER — HOSPITAL ENCOUNTER (EMERGENCY)
Age: 45
Discharge: HOME | End: 2020-09-14
Attending: EMERGENCY MEDICINE
Payer: OTHER GOVERNMENT

## 2020-09-14 ENCOUNTER — APPOINTMENT (OUTPATIENT)
Dept: GENERAL RADIOLOGY | Age: 45
End: 2020-09-14
Payer: OTHER GOVERNMENT

## 2020-09-14 VITALS
HEART RATE: 62 BPM | HEIGHT: 73 IN | OXYGEN SATURATION: 97 % | RESPIRATION RATE: 16 BRPM | SYSTOLIC BLOOD PRESSURE: 167 MMHG | WEIGHT: 220.9 LBS | DIASTOLIC BLOOD PRESSURE: 115 MMHG | TEMPERATURE: 97.5 F | BODY MASS INDEX: 29.28 KG/M2

## 2020-09-14 PROCEDURE — 99283 EMERGENCY DEPT VISIT LOW MDM: CPT

## 2020-09-14 PROCEDURE — 73562 X-RAY EXAM OF KNEE 3: CPT

## 2020-09-14 PROCEDURE — 6370000000 HC RX 637 (ALT 250 FOR IP): Performed by: EMERGENCY MEDICINE

## 2020-09-14 RX ORDER — ACETAMINOPHEN 500 MG
500 TABLET ORAL 4 TIMES DAILY PRN
Qty: 360 TABLET | Refills: 1 | Status: SHIPPED | OUTPATIENT
Start: 2020-09-14 | End: 2021-11-23 | Stop reason: ALTCHOICE

## 2020-09-14 RX ORDER — ACETAMINOPHEN 500 MG
1000 TABLET ORAL ONCE
Status: COMPLETED | OUTPATIENT
Start: 2020-09-14 | End: 2020-09-14

## 2020-09-14 RX ADMIN — ACETAMINOPHEN 1000 MG: 500 TABLET, FILM COATED ORAL at 11:50

## 2020-09-14 ASSESSMENT — PAIN DESCRIPTION - LOCATION: LOCATION: KNEE

## 2020-09-14 ASSESSMENT — PAIN DESCRIPTION - DESCRIPTORS: DESCRIPTORS: ACHING

## 2020-09-14 ASSESSMENT — PAIN DESCRIPTION - ORIENTATION: ORIENTATION: RIGHT

## 2020-09-14 ASSESSMENT — PAIN SCALES - GENERAL
PAINLEVEL_OUTOF10: 8
PAINLEVEL_OUTOF10: 8

## 2020-09-14 NOTE — ED NOTES
Walked pt from [facility] to ED bed. Obtained VS. Pt wearing mask, medic wearing mask, gloves, safety glasses.      Rishi Meza, EMT-P  09/14/20 [id number]

## 2020-09-14 NOTE — ED PROVIDER NOTES
[address] ENCOUNTER      Pt Name: Andriy Rios  MRN: [id number]  [place] 1975  Date of evaluation: 9/14/2020  Provider: Nakia Bear MD    CHIEF COMPLAINT       Chief Complaint   Patient presents with    Knee Injury     pt twisted R knee while walking down stairs 2 days ago. pain 8/10         HISTORY OF PRESENT ILLNESS   (Location/Symptom, Timing/Onset,Context/Setting, Quality, Duration, Modifying Factors, Severity)  Note limiting factors. Andriy Rios is a 40 y.o. male who presents to the emergency department for evaluation of right knee pain. Patient reports that he works as a , and does a lot of walking. He states that he believes he twisted the knee while walking down steps on Saturday. He said reports that since then he has had pain along the medial aspect of the right knee. He states it is worsened with weightbearing, also worsened by hyperflexion of the knee. He denies any numbness or weakness distally. He is not noticed any swelling of the lower extremity. He denies any falls or direct trauma to the area. He denies other injury related to this. No other complaints at the time of evaluation. NursingNotes were reviewed. REVIEW OF SYSTEMS    (2-9 systems for level 4, 10 or more for level 5)       Constitutional: No fever or chills. Respiratory: No cough, No shortness of breath, No sputum production. Cardiovascular: No chest pain. No palpitations. Gastrointestinal: No abdominal pain. No nausea or vomiting  Hematology/Lymphatics: No bleeding or bruising tendency. Immunologic: No malaise. No swollen glands. Musculoskeletal: No back pain. Right knee pain. Integumentary: No rash. No abrasions. Neurologic: No headache. No focal numbness or weakness.       PAST MEDICAL HISTORY     Past Medical History:   Diagnosis Date    Anal lesion     Asthma     as a child    Condyloma     ED (erectile dysfunction)     Histoplasmosis     [facility]          SURGICALHISTORY       Past Surgical History:   Procedure Laterality Date    ANKLE SURGERY Right     OTHER SURGICAL HISTORY Right 01/23/2018    Right First dorsal extensor compartment tendon release.     WRIST SURGERY Left 10/15/2019    LEFT FIRST DORSAL COMPARTMENT (DEQUERVAIN'S) RELEASE performed by Danny Dobbins MD at [address]       Previous Medications    ABACAVIR-LAMIVUDINE (EPZICOM) 600-300 MG PER TABLET    Take 1 tablet by mouth every evening     LISINOPRIL (PRINIVIL;ZESTRIL) 10 MG TABLET    Take 10 mg by mouth nightly    METOPROLOL SUCCINATE (TOPROL XL) 100 MG EXTENDED RELEASE TABLET    Take 100 mg by mouth nightly     PREZCOBIX 800-150 MG TABS    Take 1 tablet by mouth every evening        ALLERGIES     Amoxicillin and Penicillins    FAMILY HISTORY       Family History   Problem Relation Age of Onset    Stroke Mother     Diabetes Mother           SOCIAL HISTORY       Social History     Socioeconomic History    Marital status:      Spouse name: None    Number of children: None    Years of education: None    Highest education level: None   Occupational History    Occupation:    Social Needs    Financial resource strain: None    Food insecurity     Worry: None     Inability: None    Transportation needs     Medical: None     Non-medical: None   Tobacco Use    Smoking status: Current Every Day Smoker     Packs/day: 0.50     Years: 20.00     Pack years: 10.00     Types: Cigarettes    Smokeless tobacco: Never Used   Substance and Sexual Activity    Alcohol use: Yes     Comment: socially    Drug use: Yes     Types: Marijuana     Comment: Socially    Sexual activity: Yes   Lifestyle    Physical activity     Days per week: None     Minutes per session: None    Stress: None   Relationships    Social connections     Talks on phone: None     Gets together: None     Attends Mandaeism service: None     Active member of club or organization: None     Attends meetings of clubs or organizations: None     Relationship status: None    Intimate partner violence     Fear of current or ex partner: None     Emotionally abused: None     Physically abused: None     Forced sexual activity: None   Other Topics Concern    None   Social History Narrative    None       SCREENINGS             PHYSICAL EXAM    (up to 7 for level 4, 8 or more for level 5)     ED Triage Vitals [09/14/20 1119]   BP Temp Temp Source Pulse Resp SpO2 Height Weight   (!) 167/115 97.5 °F (36.4 °C) Infrared 62 16 97 % 6' 1\" (1.854 m) 220 lb 14.4 oz (100.2 kg)       General: Alert and oriented, No acute distress. Eye: Normal conjunctiva. Pupils equal and reactive. HENT: Oral mucosa is moist.  Respiratory: Lungs are clear to auscultation, Respirations are non-labored. Cardiovascular: Normal rate, Regular rhythm. Gastrointestinal: Soft, Non-tender, Non-distended. Musculoskeletal: No swelling, or deformity to the right knee. DP pulses are equal bilaterally in the feet, strength to plantar and dorsiflexion are 5 out of 5 bilaterally. Sensation intact distally. No calf swelling or tenderness. Reproducible tenderness along the medial aspect of the right knee. Integumentary: Warm, Dry. Neurologic: Alert, Oriented, No focal defects. Psychiatric: Cooperative. DIAGNOSTIC RESULTS     EKG: All EKG's are interpreted by the Emergency Department Physician who either signs or Co-signsthis chart in the absence of a cardiologist.        RADIOLOGY:   [place] such as CT, Ultrasound and MRI are read by the radiologist. Plain radiographic images are visualized and preliminarily interpreted by the emergency physician with the below findings:      Interpretation per the Radiologist below, if available at the time ofthis note:    XR KNEE RIGHT (3 VIEWS)   Final Result   No acute abnormality of the knee.                ED BEDSIDE ULTRASOUND:

## 2020-09-16 NOTE — ED NOTES
Ready to go home. 4 inch ace wrap applied to knee. Teaching with pt for home use of ace. Knee pain teaching with pt. Explained rx and encouraged follow up with orthopedic as referred. Discharge instructions with pt.       Bonnie Young RN  09/16/20 [id number]

## 2020-09-17 ENCOUNTER — OFFICE VISIT (OUTPATIENT)
Dept: ORTHOPEDIC SURGERY | Age: 45
End: 2020-09-17
Payer: OTHER GOVERNMENT

## 2020-09-17 ENCOUNTER — HOSPITAL ENCOUNTER (OUTPATIENT)
Dept: MRI IMAGING | Age: 45
Discharge: HOME | End: 2020-09-17
Payer: OTHER GOVERNMENT

## 2020-09-17 VITALS — TEMPERATURE: 98.6 F | RESPIRATION RATE: 16 BRPM | BODY MASS INDEX: 29.16 KG/M2 | WEIGHT: 220 LBS | HEIGHT: 73 IN

## 2020-09-17 PROCEDURE — G8419 CALC BMI OUT NRM PARAM NOF/U: HCPCS | Performed by: ORTHOPAEDIC SURGERY

## 2020-09-17 PROCEDURE — G8428 CUR MEDS NOT DOCUMENT: HCPCS | Performed by: ORTHOPAEDIC SURGERY

## 2020-09-17 PROCEDURE — 99244 OFF/OP CNSLTJ NEW/EST MOD 40: CPT | Performed by: ORTHOPAEDIC SURGERY

## 2020-09-17 PROCEDURE — 73721 MRI JNT OF LWR EXTRE W/O DYE: CPT

## 2020-09-17 NOTE — PROGRESS NOTES
Marcela Talbot Sr  <[id number]>  September 17, 2020    Chief Complaint   Patient presents with    Knee Pain     right knee        History: The patient is a 49-year-old gentleman who is here for evaluation of his right knee. The patient reportedly developed right knee pain on 9/13/2020. He does recall twisting his knee in the rain and he feels this led to the knee pain. He does have a catching sensation in the knee. He has difficulty bending the knee. He cannot take anti-inflammatories due to his hypertension. The patient is a  at [employer] in [employer]. He denies any numbness or tingling. He has no prior history of knee issues. This is a consult from Corrine Weiner for right knee pain. The patient's  past medical history, medications, allergies,  family history, social history, and have been reviewed, and dated and are recorded in the chart. Pertinent items are noted in HPI. Review of systems reviewed from Pertinent History Form dated on 9/17/20 and available in the patient's chart under the Media tab. Vitals:  Temp 98.6 °F (37 °C) (Temporal)   Resp 16   Ht 6' 1\" (1.854 m)   Wt 220 lb (99.8 kg)   BMI 29.03 kg/m²     Physical: Mr. Marcela Talbot Sr appears well, he is in no apparent distress, he demonstrates appropriate mood & affect. He is alert and oriented to person, place and time. Examination of the right knee reveals medial joint line tenderness. There is a small effusion. Jonny's is positive medially. Range of motion is from -2 degrees extension to 115 degrees of flexion. Range of motion of the opposite knee is full. Anterior drawer and Lachman are negative. There is no instability with varus or valgus stressing of the knee. There is no pain with range of motion of the hips. Sensation is intact to light touch in the tibial, peroneal, sural, and saphenous nerve distributions bilaterally. Both feet are well perfused and sensory is intact to feet equal and symmetric.  Examination of the skin reveals no lesions or ulcerations. There is no pain with range of motion of the hips. X-rays: 3 views of the right knee obtained in the emergency room were extensively reviewed. The x-rays were negative. There was no evidence of fracture or dislocation. 2 views of the right knee obtained in the office today including a tunnel and axial view were extensively reviewed. There is mild medial compartment narrowing. Impression: Right knee sprain, rule out medial meniscal tear    Plan: At this time, we will obtain an MRI scan of the right knee. I am quite concerned about the medial meniscus. The patient was encouraged to modify his activities. He may take Tylenol for the pain. He was encouraged to work on range of motion. He will ice the knee as needed. The patient will follow-up with me after the MRI scan is completed.

## 2020-09-18 ENCOUNTER — OFFICE VISIT (OUTPATIENT)
Dept: ORTHOPEDIC SURGERY | Age: 45
End: 2020-09-18
Payer: OTHER GOVERNMENT

## 2020-09-18 ENCOUNTER — TELEPHONE (OUTPATIENT)
Dept: ORTHOPEDIC SURGERY | Age: 45
End: 2020-09-18

## 2020-09-18 VITALS — HEIGHT: 73 IN | TEMPERATURE: 98.1 F | BODY MASS INDEX: 29.16 KG/M2 | WEIGHT: 220 LBS

## 2020-09-18 PROCEDURE — 99214 OFFICE O/P EST MOD 30 MIN: CPT | Performed by: ORTHOPAEDIC SURGERY

## 2020-09-18 PROCEDURE — L1832 KO ADJ JNT POS R SUP PRE CST: HCPCS | Performed by: ORTHOPAEDIC SURGERY

## 2020-09-18 PROCEDURE — 4004F PT TOBACCO SCREEN RCVD TLK: CPT | Performed by: ORTHOPAEDIC SURGERY

## 2020-09-18 PROCEDURE — G8419 CALC BMI OUT NRM PARAM NOF/U: HCPCS | Performed by: ORTHOPAEDIC SURGERY

## 2020-09-18 PROCEDURE — G8427 DOCREV CUR MEDS BY ELIG CLIN: HCPCS | Performed by: ORTHOPAEDIC SURGERY

## 2020-09-18 RX ORDER — METHYLPREDNISOLONE 4 MG/1
TABLET ORAL
Qty: 1 KIT | Refills: 0 | Status: SHIPPED | OUTPATIENT
Start: 2020-09-18 | End: 2020-09-24

## 2020-09-18 NOTE — TELEPHONE ENCOUNTER
9/18/20 DME   - NO PRECERT REQUIRED FOR IN NETWORK PROVIDERS AND LESS THAN $750 - PER [facility] GUIDELINES/NOTES -  MP

## 2020-09-18 NOTE — PROGRESS NOTES
Edmundo Norton Sr  <[id number]>  September 18, 2020    Chief Complaint   Patient presents with    Follow-up     right knee       History: The patient is a 45-year-old gentleman who is here in follow-up regarding his right knee. He continues to have moderate pain. He has been taking Tylenol. He is here to review the MRI results. The patient's  past medical history, medications, allergies,  family history, social history, and have been reviewed, and dated and are recorded in the chart. Pertinent items are noted in HPI. Review of systems reviewed from Pertinent History Form dated on 9/17/20 and available in the patient's chart under the Media tab. Vitals:  Temp 98.1 °F (36.7 °C)   Ht 6' 1\" (1.854 m)   Wt 220 lb (99.8 kg)   BMI 29.03 kg/m²     Physical: Mr. Edmundo Norton Sr appears well, he is in no apparent distress, he demonstrates appropriate mood & affect. He is alert and oriented to person, place and time. Examination of the right knee reveals mild medial joint line tenderness. There is a small effusion. Jonny's is negative. Range of motion is from -1 degrees extension to 120 degrees of flexion. Range of motion of the opposite knee is full. Anterior drawer and Lachman are negative. There is no instability with varus or valgus stressing of the knee. The patient does have moderate tenderness palpation over the proximal aspect of the medial collateral ligament. There is moderate pain with valgus stressing of the knee. There is no pain with range of motion of the hips. Sensation is intact to light touch in the tibial, peroneal, sural, and saphenous nerve distributions bilaterally. Both feet are well perfused and sensory is intact to feet equal and symmetric. Examination of the skin reveals no lesions or ulcerations. There is no pain with range of motion of the hips. X-rays: MRI of the right knee was extensively reviewed.   The MRI confirms a subtle irregularity to the medial femoral condyle cartilage. There is mild to moderate chondromalacia involving the medial femoral condyle. There is fluid signal within the medial collateral ligament consistent with a grade 1-2 sprain. Otherwise the MRI is unremarkable. 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 This definitely will protect the knee while he is working as a . He was given a Medrol Dosepak. He was encouraged to modify his activities. He may continue to ice the knee as needed. The patient will follow-up with me in 4 to 5 weeks and we will reassess him then.

## 2020-10-14 ENCOUNTER — APPOINTMENT (OUTPATIENT)
Dept: GENERAL RADIOLOGY | Age: 45
End: 2020-10-14
Payer: OTHER GOVERNMENT

## 2020-10-14 ENCOUNTER — HOSPITAL ENCOUNTER (EMERGENCY)
Age: 45
Discharge: HOME | End: 2020-10-15
Attending: STUDENT IN AN ORGANIZED HEALTH CARE EDUCATION/TRAINING PROGRAM
Payer: OTHER GOVERNMENT

## 2020-10-14 VITALS
HEART RATE: 68 BPM | DIASTOLIC BLOOD PRESSURE: 83 MMHG | RESPIRATION RATE: 16 BRPM | BODY MASS INDEX: 29.96 KG/M2 | SYSTOLIC BLOOD PRESSURE: 178 MMHG | WEIGHT: 227.07 LBS | TEMPERATURE: 98.7 F | OXYGEN SATURATION: 100 %

## 2020-10-14 PROCEDURE — 6370000000 HC RX 637 (ALT 250 FOR IP): Performed by: STUDENT IN AN ORGANIZED HEALTH CARE EDUCATION/TRAINING PROGRAM

## 2020-10-14 PROCEDURE — 71046 X-RAY EXAM CHEST 2 VIEWS: CPT

## 2020-10-14 PROCEDURE — 99283 EMERGENCY DEPT VISIT LOW MDM: CPT

## 2020-10-14 PROCEDURE — 93005 ELECTROCARDIOGRAM TRACING: CPT | Performed by: STUDENT IN AN ORGANIZED HEALTH CARE EDUCATION/TRAINING PROGRAM

## 2020-10-14 RX ORDER — ACETAMINOPHEN 500 MG
1000 TABLET ORAL ONCE
Status: COMPLETED | OUTPATIENT
Start: 2020-10-14 | End: 2020-10-14

## 2020-10-14 RX ORDER — IBUPROFEN 400 MG/1
800 TABLET ORAL ONCE
Status: DISCONTINUED | OUTPATIENT
Start: 2020-10-14 | End: 2020-10-14

## 2020-10-14 RX ADMIN — ACETAMINOPHEN 1000 MG: 500 TABLET, FILM COATED ORAL at 23:18

## 2020-10-14 ASSESSMENT — PAIN DESCRIPTION - LOCATION: LOCATION: RIB CAGE

## 2020-10-14 ASSESSMENT — PAIN DESCRIPTION - PAIN TYPE: TYPE: ACUTE PAIN

## 2020-10-14 ASSESSMENT — PAIN DESCRIPTION - DESCRIPTORS: DESCRIPTORS: SHARP

## 2020-10-14 ASSESSMENT — PAIN DESCRIPTION - ORIENTATION: ORIENTATION: LEFT

## 2020-10-14 ASSESSMENT — PAIN SCALES - GENERAL: PAINLEVEL_OUTOF10: 7

## 2020-10-14 ASSESSMENT — PAIN DESCRIPTION - FREQUENCY: FREQUENCY: INTERMITTENT

## 2020-10-15 LAB
EKG ATRIAL RATE: 64 BPM
EKG DIAGNOSIS: NORMAL
EKG P AXIS: 13 DEGREES
EKG P-R INTERVAL: 172 MS
EKG Q-T INTERVAL: 412 MS
EKG QRS DURATION: 82 MS
EKG QTC CALCULATION (BAZETT): 425 MS
EKG R AXIS: 13 DEGREES
EKG T AXIS: 76 DEGREES
EKG VENTRICULAR RATE: 64 BPM

## 2020-10-15 PROCEDURE — 93010 ELECTROCARDIOGRAM REPORT: CPT | Performed by: INTERNAL MEDICINE

## 2020-10-15 ASSESSMENT — PAIN - FUNCTIONAL ASSESSMENT: PAIN_FUNCTIONAL_ASSESSMENT: 0-10

## 2020-10-15 ASSESSMENT — PAIN SCALES - GENERAL: PAINLEVEL_OUTOF10: 4
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
## 2021-03-06 ENCOUNTER — APPOINTMENT (OUTPATIENT)
Dept: GENERAL RADIOLOGY | Age: 46
End: 2021-03-06
Payer: OTHER GOVERNMENT

## 2021-03-06 ENCOUNTER — HOSPITAL ENCOUNTER (EMERGENCY)
Age: 46
Discharge: HOME | End: 2021-03-06
Attending: EMERGENCY MEDICINE
Payer: OTHER GOVERNMENT

## 2021-03-06 VITALS
TEMPERATURE: 97.2 F | RESPIRATION RATE: 14 BRPM | HEART RATE: 67 BPM | OXYGEN SATURATION: 100 % | DIASTOLIC BLOOD PRESSURE: 100 MMHG | SYSTOLIC BLOOD PRESSURE: 189 MMHG
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
## 2021-03-06 DIAGNOSIS — S63.502A LEFT WRIST SPRAIN, INITIAL ENCOUNTER: ICD-10-CM

## 2021-03-06 PROCEDURE — 73110 X-RAY EXAM OF WRIST: CPT

## 2021-03-06 PROCEDURE — 99283 EMERGENCY DEPT VISIT LOW MDM: CPT

## 2021-03-06 RX ORDER — METHYLPREDNISOLONE 4 MG/1
4 TABLET ORAL SEE ADMIN INSTRUCTIONS
Qty: 1 KIT | Refills: 0 | Status: SHIPPED | OUTPATIENT
Start: 2021-03-06 | End: 2021-03-12

## 2021-03-06 ASSESSMENT — PAIN SCALES - GENERAL
PAINLEVEL_OUTOF10: 7
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
## 2021-03-30 ENCOUNTER — APPOINTMENT (OUTPATIENT)
Dept: GENERAL RADIOLOGY | Age: 46
End: 2021-03-30
Payer: OTHER GOVERNMENT

## 2021-03-30 ENCOUNTER — HOSPITAL ENCOUNTER (EMERGENCY)
Age: 46
Discharge: HOME | End: 2021-03-30
Attending: EMERGENCY MEDICINE
Payer: OTHER GOVERNMENT

## 2021-03-30 VITALS
OXYGEN SATURATION: 100 % | DIASTOLIC BLOOD PRESSURE: 105 MMHG | RESPIRATION RATE: 18 BRPM | BODY MASS INDEX: 26.39 KG/M2 | HEART RATE: 72 BPM | SYSTOLIC BLOOD PRESSURE: 198 MMHG | WEIGHT: 200 LBS | TEMPERATURE: 97.8 F

## 2021-03-30 DIAGNOSIS — S69.91XA INJURY OF RIGHT HAND, INITIAL ENCOUNTER: Primary | ICD-10-CM

## 2021-03-30 DIAGNOSIS — S60.221A CONTUSION OF RIGHT HAND, INITIAL ENCOUNTER: ICD-10-CM

## 2021-03-30 PROCEDURE — 99282 EMERGENCY DEPT VISIT SF MDM: CPT

## 2021-03-30 PROCEDURE — 73130 X-RAY EXAM OF HAND: CPT

## 2021-03-30 RX ORDER — ACETAMINOPHEN 500 MG
500 TABLET ORAL EVERY 6 HOURS PRN
Qty: 28 TABLET | Refills: 0 | Status: SHIPPED | OUTPATIENT
Start: 2021-03-30 | End: 2021-11-23 | Stop reason: ALTCHOICE

## 2021-03-30 RX ORDER — TRAMADOL HYDROCHLORIDE 50 MG/1
50 TABLET ORAL EVERY 6 HOURS PRN
Qty: 12 TABLET | Refills: 0 | Status: SHIPPED | OUTPATIENT
Start: 2021-03-30 | End: 2021-04-02

## 2021-03-30 ASSESSMENT — PAIN SCALES - GENERAL: PAINLEVEL_OUTOF10: 6

## 2021-03-30 ASSESSMENT — PAIN DESCRIPTION - DESCRIPTORS: DESCRIPTORS: ACHING

## 2021-03-30 ASSESSMENT — PAIN DESCRIPTION - LOCATION: LOCATION: HAND

## 2021-03-30 NOTE — ED PROVIDER NOTES
Emergency Physician Note  [address] [facility] [address]  Dept: [phone]  Loc: [phone]  Open Note Time:  6:11 PM EDT    Chief Complaint  Hand Injury (R hand pt struck wall at 0100 today. pain 8/10)       History of Present Illness  Yusuf Bellamy is a 39 y.o. male  has a past medical history of Anal lesion, Asthma, Condyloma, ED (erectile dysfunction), Histoplasmosis, and HIV disease ([address] who presents to the ED for right hand pain. Patient punched a wall last night. He has had increasing pain and swelling of his right hand. However he does report that this morning the following the right hand was worse than it is now. Pain is mostly focal around the third through fifth knuckles of his right hand. He has tried Tylenol with minimal relief, patient cannot take ibuprofen. Patient is right-handed. Denies fever, chest pain, shortness of breath, cough, abdominal pain, nausea, vomiting, diarrhea, headache, sore throat, dysuria, back pain, rash. No palliative/provocative factors. Unless otherwise stated in this report or unable to obtain because of the patient's clinical or mental status as evidenced by the medical record, this patient's positive and negative responses for review of systems, constitutional, psych, eyes, ENT, cardiovascular, respiratory, gastrointestinal, neurological, genitourinary, musculoskeletal, integument systems and systems related to the presenting problem are either stated in the preceding paragraph or were not pertinent or were negative for the symptoms and/or complaints related to the medical problem. I have reviewed the following from the nursing documentation:      Prior to Admission medications    Medication Sig Start Date End Date Taking?  Authorizing Provider   acetaminophen (TYLENOL) 500 MG tablet Take 1 tablet by mouth 4 times daily as needed for Pain 9/14/20   [place] Comment: socially    Drug use: Yes     Types: Marijuana     Comment: Socially    Sexual activity: Yes   Lifestyle    Physical activity     Days per week: Not on file     Minutes per session: Not on file    Stress: Not on file   Relationships    Social connections     Talks on phone: Not on file     Gets together: Not on file     Attends Alevism service: Not on file     Active member of club or organization: Not on file     Attends meetings of clubs or organizations: Not on file     Relationship status: Not on file    Intimate partner violence     Fear of current or ex partner: Not on file     Emotionally abused: Not on file     Physically abused: Not on file     Forced sexual activity: Not on file   Other Topics Concern    Not on file   Social History Narrative    Not on file       Nursing notes reviewed. ED Triage Vitals [03/30/21 1753]   Enc Vitals Group      BP (!) 198/105      Pulse 72      Resp 18      Temp 97.8 °F (36.6 °C)      Temp Source Oral      SpO2 100 %      Weight 200 lb (90.7 kg)      Height       Head Circumference       Peak Flow       Pain Score       Pain Loc       Pain Edu? Excl. in [address]? GENERAL:   Body mass index is 26.39 kg/m². Awake, alert. Well developed, well nourished with no apparent distress. Nontoxic-appearing, non-ill-appearing. HENT:   Normocephalic, Atraumatic, no lacerations. No ENT exam due to PPE. EYES:   Conjunctiva normal,   Pupils equal round and reactive to light,   Extraocular movements normal.  NECK:  Trachea is midline. No stridor. CHEST:  Regular rate and regular rhythm, no murmurs/rubs/gallops,  normal S1/S2, chest wall non-tender. LUNGS:  No respiratory distress. No abdominal retractions, no sternal retractions  Clear to auscultation bilaterally, no wheezing, no rhochi, no rales  Speaking comfortably in full sentences  ABDOMEN:  Soft, non-tender, non-distended. No guarding. No rebound. No costovertebral angle tenderness to palpation. Normal BS, no organomegaly, no abdominal masses  EXTREMITIES:  Moves extremities x4 with purpose. distal pulses present and equal bilaterally. Normal range of motion, no edema, No tenderness, no deformity -exceptions listed below on my detailed exam of the right hand  Right hand: Patient has difficulty flexing third through fifth digit due to pain along the MCP. Significant moderate tenderness to palpation over the MCP of the third fourth and fifth digit with some mild swelling, unable to discern any ecchymosis secondary to skin color, moderate tenderness to palpation of the distal aspect of the metacarpals of the third through the fifth digits. He is able to spread his fingers apart, no obvious deformity of the right hand. No edema. BACK:  No midline tenderness in the cervical, thoracic, and lumbar spine. No deformities, no step-off. SKIN:  Warm, dry and intact. NEUROLOGIC:  Normal mental status. Moving all extremities to command. Alert and oriented x4  without focal motor deficit or gross sensory deficit. Normal speech. PSYCHIATRIC:  Not anxious,  normal mood and affect,  Appropriate eye contact,  thoughts are linear and organized,  without delusions/hallucinations,  Not responding to internal stimuli,  responds appropriately to questions    LABS and DIAGNOSTIC RESULTS    RADIOLOGY  X-RAYS:  I have reviewed radiologic plain film image(s). ALL OTHER NON-PLAIN FILM IMAGES SUCH AS CT, ULTRASOUND AND MRI HAVE BEEN READ BY THE RADIOLOGIST. XR HAND RIGHT (MIN 3 VIEWS)   Final Result   Soft tissue swelling dorsally without fracture.               Hand X-Ray    Interpreted by: Emergency Department Physician    Findings: Right hand: No fracture, Normal alignment, No foreign body and Soft tissue swelling LABS  No results found for this visit on 03/30/21. SCREENINGS  NIH Score     [facility]     [facility]    Heart Score       PROCEDURES    MEDICAL DECISION MAKING    Procedures/interventions/images ordered for this visit  Orders Placed This Encounter   Procedures    XR HAND RIGHT (MIN 3 VIEWS)       Medications ordered for this visit  No orders of the defined types were placed in this encounter. ED course notes for this visit       I wore N95 Envo mask with filter protection, facial shield and gloves when I evaluated the patient. I evaluated the patient in room QC 03/03    I discussed the nature and purpose, risks and benefits, as well as, the alternatives of opiates for pain relief with Harshad Carolina. The risks discussed included but were not limit ed to medication reaction, overdose, addiction, dependence, and tolerance. Cahd Penn Sr was given the time and opportunity to ask questions and consider their options, and after the discussion, Chad Penn Sr decided to verbally consent to opiates. Prior to consenting, I believe that Harshad Carolina has the capacity to make this medical decision. Controlled Substance Monitoring:    Acute and Chronic Pain Monitoring:   RX Monitoring 3/30/2021   Attestation -   Periodic Controlled Substance Monitoring No signs of potential drug abuse or diversion identified. This is a very pleasant patient with a musculoskeletal or soft tissue injury. There is no significant evidence of fracture, compartment syndrome, neurovascular compromise, or other process requiring immediate surgical intervention at this time. It was explained that preliminary reading of their xrays at this time shows no fractures, but a radiologist will also review them and they will be contacted if there is any discrepancy.   It is understood that other occult fractures, ligament injury, tendon injury, cartilage injury, and joint injury have been considered and cannot be completely excluded. It is understood that if the patient is not improving as expected or if other new symptoms or signs of concern develop, other etiologies or diagnoses may need to be considered requiring other tests, treatments, consultations, and/or admission. The diagnosis, plan, expected course, follow-up, and return precautions were discussed and all questions were answered. Final Impression    1. Injury of right hand, initial encounter    2. Contusion of right hand, initial encounter        Blood pressure (!) 198/105, pulse 72, temperature 97.8 °F (36.6 °C), temperature source Oral, resp. rate 18, weight 200 lb (90.7 kg), SpO2 100 %. Medication Summary  Patient was given scripts for the following medications. I counseled patient how to take these medications. New Prescriptions    ACETAMINOPHEN (TYLENOL) 500 MG TABLET    Take 1 tablet by mouth every 6 hours as needed for Pain    TRAMADOL (ULTRAM) 50 MG TABLET    Take 1 tablet by mouth every 6 hours as needed for Pain for up to 3 days. Intended supply: 3 days. Take lowest dose possible to manage pain       Patient had scripts modified or refilled for the following medications. I counseled patient how to take these medications. Modified Medications    No medications on file       Patient had scripts discontinues for the following medications. I counseled patient to stop taking these medications. Discontinued Medications    No medications on file       Disposition  At this point I do not feel the patient requires further work up and it is reasonable to discharge the patient. I had a discussion with the patient and/or their surrogate regarding diagnosis, diagnostic testing results, treatment/ plan of care, and follow up. Patient and/or companions verbalized understanding of the ED workup, any relevant findings as well as any incidental findings, and the disposition and plan.   There was shared decision-making between myself as well as the patient and/or their surrogate and we are all in agreement with discharge home. Chilango Stephenson was an opportunity for questions and all questions were answered to the best of my ability and to the satisfaction of the patient and/or patient's family. Patient agreed to follow up as recommend for further evaluation/treatment. The patient was given strict return precautions as we discussed symptoms that would necessitate return to the ED. Patient will return to ED for new/worsening symptoms. The patient verbalized their understanding and agreement with the above plan. Please refer to AVS for further details regarding discharge instructions. Pt is in stable condition upon Discharge to home. The note was completed using Dragon voice recognition transcription. Every effort was made to ensure accuracy; however, inadvertent transcription errors may be present despite my best efforts to edit errors.     Mayank Hines MD  [address] [place] [address]        Mayank Hines MD  03/30/21 4728

## 2021-07-27 ENCOUNTER — HOSPITAL ENCOUNTER (EMERGENCY)
Age: 46
Discharge: HOME | End: 2021-07-27
Attending: EMERGENCY MEDICINE
Payer: OTHER GOVERNMENT

## 2021-07-27 VITALS
OXYGEN SATURATION: 100 % | DIASTOLIC BLOOD PRESSURE: 98 MMHG | TEMPERATURE: 98.3 F | RESPIRATION RATE: 16 BRPM | SYSTOLIC BLOOD PRESSURE: 183 MMHG | BODY MASS INDEX: 27 KG/M2 | WEIGHT: 203.71 LBS | HEART RATE: 70 BPM | HEIGHT: 73 IN

## 2021-07-27 DIAGNOSIS — Z20.2 EXPOSURE TO STD: Primary | ICD-10-CM

## 2021-07-27 LAB
BILIRUBIN URINE: NEGATIVE
BLOOD, URINE: ABNORMAL
CLARITY: ABNORMAL
COLOR: YELLOW
EPITHELIAL CELLS, UA: 1 /HPF (ref 0–5)
GLUCOSE URINE: NEGATIVE MG/DL
HYALINE CASTS: 1 /LPF (ref 0–8)
KETONES, URINE: NEGATIVE MG/DL
LEUKOCYTE ESTERASE, URINE: ABNORMAL
MICROSCOPIC EXAMINATION: YES
NITRITE, URINE: NEGATIVE
PH UA: 6 (ref 5–8)
PROTEIN UA: >=300 MG/DL
RBC UA: 17 /HPF (ref 0–4)
SPECIFIC GRAVITY UA: 1.02 (ref 1–1.03)
URINE REFLEX TO CULTURE: YES
URINE TYPE: ABNORMAL
UROBILINOGEN, URINE: 1 E.U./DL
WBC UA: 531 /HPF (ref 0–5)

## 2021-07-27 PROCEDURE — 87491 CHLMYD TRACH DNA AMP PROBE: CPT

## 2021-07-27 PROCEDURE — 81001 URINALYSIS AUTO W/SCOPE: CPT

## 2021-07-27 PROCEDURE — 96372 THER/PROPH/DIAG INJ SC/IM: CPT

## 2021-07-27 PROCEDURE — 99284 EMERGENCY DEPT VISIT MOD MDM: CPT

## 2021-07-27 PROCEDURE — 87086 URINE CULTURE/COLONY COUNT: CPT

## 2021-07-27 PROCEDURE — 6360000002 HC RX W HCPCS: Performed by: EMERGENCY MEDICINE

## 2021-07-27 PROCEDURE — 6370000000 HC RX 637 (ALT 250 FOR IP): Performed by: EMERGENCY MEDICINE

## 2021-07-27 PROCEDURE — 87591 N.GONORRHOEAE DNA AMP PROB: CPT

## 2021-07-27 RX ORDER — METRONIDAZOLE 500 MG/1
2000 TABLET ORAL ONCE
Status: COMPLETED | OUTPATIENT
Start: 2021-07-27 | End: 2021-07-27

## 2021-07-27 RX ORDER — DOXYCYCLINE HYCLATE 100 MG
100 TABLET ORAL ONCE
Status: COMPLETED | OUTPATIENT
Start: 2021-07-27 | End: 2021-07-27

## 2021-07-27 RX ORDER — CEFTRIAXONE 500 MG/1
500 INJECTION, POWDER, FOR SOLUTION INTRAMUSCULAR; INTRAVENOUS ONCE
Status: COMPLETED | OUTPATIENT
Start: 2021-07-27 | End: 2021-07-27

## 2021-07-27 RX ORDER — DOXYCYCLINE 100 MG/1
100 TABLET ORAL 2 TIMES DAILY
Qty: 14 TABLET | Refills: 0 | Status: SHIPPED | OUTPATIENT
Start: 2021-07-27 | End: 2021-08-03

## 2021-07-27 RX ADMIN — METRONIDAZOLE 2000 MG: 500 TABLET ORAL at 04:27

## 2021-07-27 RX ADMIN — DOXYCYCLINE HYCLATE 100 MG: 100 TABLET, COATED ORAL at 04:27

## 2021-07-27 RX ADMIN — CEFTRIAXONE SODIUM 500 MG: 500 INJECTION, POWDER, FOR SOLUTION INTRAMUSCULAR; INTRAVENOUS at 04:27

## 2021-07-27 ASSESSMENT — ENCOUNTER SYMPTOMS
ANAL BLEEDING: 0
SHORTNESS OF BREATH: 0
VOMITING: 0
EYE REDNESS: 0
DIARRHEA: 0
RECTAL PAIN: 0
BACK PAIN: 0
STRIDOR: 0
COUGH: 0
PHOTOPHOBIA: 0
ABDOMINAL DISTENTION: 0
NAUSEA: 0
EYE ITCHING: 0
APNEA: 0
CHOKING: 0
WHEEZING: 0
ABDOMINAL PAIN: 0
EYE PAIN: 0
CHEST TIGHTNESS: 0
COLOR CHANGE: 0
BLOOD IN STOOL: 0
CONSTIPATION: 0
EYE DISCHARGE: 0

## 2021-07-27 NOTE — ED PROVIDER NOTES
[address] [facility]      Pt Name: Kaern Patel  MRN: [id number]  [place] 1975  Date of evaluation: 7/27/2021  Provider: Tejinder Alejo MD    CHIEF COMPLAINT       Chief Complaint   Patient presents with    Exposure to STD     states had unprotected sex a wk ago and is now having burning and greenish discharge        HISTORY OF PRESENT ILLNESS    Karen Patel is a 39 y.o. male who presents to the emergency department with STD. Patient is concern for an STD. Endorses penile discharge and penile irritation. States he thinks he might have gonorrhea or chlamydia. Denies ulcers. Denies pain. No fevers or chills. No other associated symptoms. Nursing Notes were reviewed. Including nursing noted for FM, Surgical History, Past Medical History, Social History, vitals, and allergies; agree with all. REVIEW OF SYSTEMS       Review of Systems   Constitutional: Negative for activity change, appetite change, chills, diaphoresis, fatigue, fever and unexpected weight change. HENT: Negative for congestion, dental problem, drooling, ear discharge and ear pain. Eyes: Negative for photophobia, pain, discharge, redness, itching and visual disturbance. Respiratory: Negative for apnea, cough, choking, chest tightness, shortness of breath, wheezing and stridor. Cardiovascular: Negative for chest pain, palpitations and leg swelling. Gastrointestinal: Negative for abdominal distention, abdominal pain, anal bleeding, blood in stool, constipation, diarrhea, nausea, rectal pain and vomiting. Endocrine: Negative for cold intolerance and heat intolerance. Genitourinary: Positive for discharge. Negative for decreased urine volume, penile swelling, scrotal swelling, testicular pain and urgency. Musculoskeletal: Negative for arthralgias and back pain. Skin: Negative for color change and pallor.    Neurological: Negative for dizziness and facial asymmetry. Hematological: Negative for adenopathy. Does not bruise/bleed easily. Psychiatric/Behavioral: Negative for agitation, behavioral problems, confusion and decreased concentration. Except as noted above the remainder of the review of systems was reviewed and negative. PAST MEDICAL HISTORY     Past Medical History:   Diagnosis Date    Anal lesion     Asthma     as a child    Condyloma     ED (erectile dysfunction)     Histoplasmosis     HIV disease ([facility] [address]        SURGICAL HISTORY       Past Surgical History:   Procedure Laterality Date    ANKLE SURGERY Right     OTHER SURGICAL HISTORY Right 01/23/2018    Right First dorsal extensor compartment tendon release.     WRIST SURGERY Left 10/15/2019    LEFT FIRST DORSAL COMPARTMENT (DEQUERVAIN'S) RELEASE performed by Gordon Echols MD at [facility]       Previous Medications    ABACAVIR-LAMIVUDINE (EPZICOM) 600-300 MG PER TABLET    Take 1 tablet by mouth every evening     ACETAMINOPHEN (TYLENOL) 500 MG TABLET    Take 1 tablet by mouth 4 times daily as needed for Pain    ACETAMINOPHEN (TYLENOL) 500 MG TABLET    Take 1 tablet by mouth every 6 hours as needed for Pain    LISINOPRIL (PRINIVIL;ZESTRIL) 10 MG TABLET    Take 10 mg by mouth nightly    METOPROLOL SUCCINATE (TOPROL XL) 100 MG EXTENDED RELEASE TABLET    Take 100 mg by mouth nightly     PREZCOBIX 800-150 MG TABS    Take 1 tablet by mouth every evening        ALLERGIES     Amoxicillin and Penicillins    FAMILY HISTORY        Family History   Problem Relation Age of Onset    Stroke Mother     Diabetes Mother        SOCIAL HISTORY       Social History     Socioeconomic History    Marital status:      Spouse name: Not on file    Number of children: Not on file    Years of education: Not on file    Highest education level: Not on file   Occupational History    Occupation: [employer]   Tobacco Use    Smoking status: Current Every Day Smoker     Packs/day: 0.50     Years: 20.00     Pack years: 10.00     Types: Cigarettes    Smokeless tobacco: Current User   Vaping Use    Vaping Use: Never used   Substance and Sexual Activity    Alcohol use: Yes     Comment: socially    Drug use: Yes     Types: Marijuana     Comment: Socially    Sexual activity: Yes   Other Topics Concern    Not on file   Social History Narrative    Not on file     Social Determinants of Health     Financial Resource Strain:     Difficulty of Paying Living Expenses:    Food Insecurity:     Worried About Running Out of Food in the Last Year:     Ran Out of Food in the Last Year:    Transportation Needs:     Lack of Transportation (Medical):  Lack of Transportation (Non-Medical):    Physical Activity:     Days of Exercise per Week:     Minutes of Exercise per Session:    Stress:     Feeling of Stress :    Social Connections:     Frequency of Communication with Friends and Family:     Frequency of Social Gatherings with Friends and Family:     Attends Hoahaoism Services:     Active Member of Clubs or Organizations:     Attends Club or Organization Meetings:     Marital Status:    Intimate Partner Violence:     Fear of Current or Ex-Partner:     Emotionally Abused:     Physically Abused:     Sexually Abused:        PHYSICAL EXAM       ED Triage Vitals [07/27/21 0359]   BP Temp Temp Source Pulse Resp SpO2 Height Weight   (!) 183/98 98.3 °F (36.8 °C) Oral 70 16 100 % 6' 1\" (1.854 m) 203 lb 11.3 oz (92.4 kg)       Physical Exam  Vitals and nursing note reviewed. Constitutional:       General: He is not in acute distress. Appearance: He is well-developed. He is not diaphoretic. HENT:      Head: Normocephalic and atraumatic. Eyes:      General:         Right eye: No discharge. Left eye: No discharge. Pupils: Pupils are equal, round, and reactive to light. Neck:      Thyroid: No thyromegaly. Trachea: No tracheal deviation.    Cardiovascular:      Rate and Rhythm: Normal rate and regular rhythm. Heart sounds: No murmur heard. Pulmonary:      Breath sounds: No wheezing or rales. Chest:      Chest wall: No tenderness. Abdominal:      General: There is no distension. Palpations: Abdomen is soft. There is no mass. Tenderness: There is no abdominal tenderness. There is no guarding or rebound. Musculoskeletal:         General: No tenderness or deformity. Normal range of motion. Cervical back: Normal range of motion. Skin:     General: Skin is warm. Coloration: Skin is not pale. Findings: No erythema or rash. Neurological:      Mental Status: He is alert. Cranial Nerves: No cranial nerve deficit. Motor: No abnormal muscle tone.       Coordination: Coordination normal.         DIAGNOSTIC RESULTS     EKG: All EKG's are interpreted by the Emergency Department Physician who either signs or Co-signs this chart in the absence of acardiologist.    None    RADIOLOGY:   Non-plain film images such as CT, Ultrasoundand MRI are read by the radiologist. Plain radiographic images are visualized and preliminarily interpreted by the emergency physician with the below findings:    None    ED BEDSIDE ULTRASOUND:   Performed by ED Physician - none    LABS:  Labs Reviewed   URINE RT REFLEX TO CULTURE - Abnormal; Notable for the following components:       Result Value    Clarity, UA CLOUDY (*)     Blood, Urine MODERATE (*)     Protein, UA >=300 (*)     Leukocyte Esterase, Urine LARGE (*)     All other components within normal limits    Narrative:     Performed at:  [facility]  [address] [place], [place] [address]   Phone [phone]   MICROSCOPIC URINALYSIS - Abnormal; Notable for the following components:    WBC,  (*)     RBC, UA 17 (*)     All other components within normal limits    Narrative:     Performed at:  [facility] [place]  [address] [place] [address]   Phone [phone]   C. TRACHOMATIS N.GONORRHOEAE DNA, URINE   CULTURE, URINE       All other labs were withinnormal range or not returned as of this dictation. EMERGENCY DEPARTMENT COURSE and DIFFERENTIAL DIAGNOSIS/MDM:     PMH, Surgical Hx, FH, Social Hx reviewed by myself (ETOH usage, Tobacco usage, Drug usage reviewed by myself, no pertinent Hx)- No Pertinent Hx     Old records were reviewed by me    Patient was treated for gonorrhea, chlamydia, trichomonas. Other STD treatments need to be tested outpatient. Told HIV testing needs to be done outpatient. I estimate there is LOW risk for Sepsis, MI, Stroke, Tamponade, PTX, Toxicity or other life threatening etiology thus I consider the discharge disposition reasonable. The patient is at low risk for mortality based on demographic, history and clinical factors. Given the best available information and clinical assessment, I estimate the risk of hospitalization to be greater than risk of treatment at home. I have explained to the patient that the risk could rapidly change, given precautions for return and instructions. Explained to patient that the risk for mortality is low based on demographic, history and clinical factors. I discussed with patient the results of evaluation in the ED, diagnosis, care, and prognosis. The plan is to discharge to home. Patient is in agreement with plan and questions have been answered. I also discussed with patient the reasons which may require a return visit and the importance of follow-up care. The patient is well-appearing, nontoxic, and improved at the time of discharge. Patient agrees to call to arrange follow-up care as directed. Patient understands to return immediately for worsening/change in symptoms. CRITICAL CARE TIME   Total Critical Caretime was 21 minutes, excluding separately reportable procedures.   There was a high probability of clinically significant/life threatening deterioration in the patient's condition which required my urgent intervention. PROCEDURES:  Unlessotherwise noted below, none    FINAL IMPRESSION      1.  Exposure to STD          DISPOSITION/PLAN   DISPOSITION Discharge - Pending Orders Complete 07/27/2021 04:19:56 AM    PATIENT REFERRED TO:  [address]  [address] [facility]            DISCHARGE MEDICATIONS:  New Prescriptions    DOXYCYCLINE MONOHYDRATE (ADOXA) 100 MG TABLET    Take 1 tablet by mouth 2 times daily for 7 days          (Please note that portions ofthis note were completed with a voice recognition program.  Efforts were made to edit the dictations but occasionally words are mis-transcribed.)    Nael Guillen MD(electronically signed)  Attending Emergency Physician          Nael Guillen MD  07/27/21 1030

## 2021-07-27 NOTE — ED TRIAGE NOTES
Didier Soto  is a 39 y.o. male presents in the ED today via private vehicle. Pt states he had unprotected sex a wk ago and he is now having burning with urination and greenish discharge. Pt is ambulatory to room without assistance. Call light in reach.

## 2021-07-28 LAB — URINE CULTURE, ROUTINE: NORMAL

## 2021-07-29 LAB
C. TRACHOMATIS DNA ,URINE: NEGATIVE
N. GONORRHOEAE DNA, URINE: NEGATIVE

## 2021-08-23 ENCOUNTER — TELEPHONE (OUTPATIENT)
Dept: ORTHOPEDIC SURGERY | Age: 46
End: 2021-08-23

## 2021-08-23 NOTE — TELEPHONE ENCOUNTER
Appointment Request     Patient requesting earlier appointment: Yes  Appointment offered to patient: 9/17/21  Patient Contact Number: [phone]    FOLLOW ON RIGHT WRIST SINCE SX. HAVING SHOOTING PAIN AND TINGLING UP TO ELBOW

## 2021-09-16 ENCOUNTER — OFFICE VISIT (OUTPATIENT)
Dept: ORTHOPEDIC SURGERY | Age: 46
End: 2021-09-16
Payer: OTHER GOVERNMENT

## 2021-09-16 VITALS — WEIGHT: 203 LBS | BODY MASS INDEX: 26.9 KG/M2 | HEIGHT: 73 IN | RESPIRATION RATE: 16 BRPM

## 2021-09-16 DIAGNOSIS — G56.01 CARPAL TUNNEL SYNDROME, RIGHT: Primary | ICD-10-CM

## 2021-09-16 PROCEDURE — 20526 THER INJECTION CARP TUNNEL: CPT | Performed by: ORTHOPAEDIC SURGERY

## 2021-09-16 PROCEDURE — 99213 OFFICE O/P EST LOW 20 MIN: CPT | Performed by: ORTHOPAEDIC SURGERY

## 2021-09-16 PROCEDURE — G8427 DOCREV CUR MEDS BY ELIG CLIN: HCPCS | Performed by: ORTHOPAEDIC SURGERY

## 2021-09-16 PROCEDURE — 4004F PT TOBACCO SCREEN RCVD TLK: CPT | Performed by: ORTHOPAEDIC SURGERY

## 2021-09-16 PROCEDURE — G8419 CALC BMI OUT NRM PARAM NOF/U: HCPCS | Performed by: ORTHOPAEDIC SURGERY

## 2021-09-16 RX ORDER — LIDOCAINE HYDROCHLORIDE 10 MG/ML
1 INJECTION, SOLUTION INFILTRATION; PERINEURAL ONCE
Status: COMPLETED | OUTPATIENT
Start: 2021-09-16 | End: 2021-09-16

## 2021-09-16 RX ORDER — TRIAMCINOLONE ACETONIDE 40 MG/ML
40 INJECTION, SUSPENSION INTRA-ARTICULAR; INTRAMUSCULAR ONCE
Status: COMPLETED | OUTPATIENT
Start: 2021-09-16 | End: 2021-09-16

## 2021-09-16 RX ADMIN — TRIAMCINOLONE ACETONIDE 40 MG: 40 INJECTION, SUSPENSION INTRA-ARTICULAR; INTRAMUSCULAR at 16:03

## 2021-09-16 RX ADMIN — LIDOCAINE HYDROCHLORIDE 1 ML: 10 INJECTION, SOLUTION INFILTRATION; PERINEURAL at 16:03

## 2021-09-16 NOTE — PROGRESS NOTES
This 39 y.o., right hand dominant delivery/ is seen today with a chief complaint of numbness and tingling of the right hand. Symptoms have been present for 1 month. The patient also frequently notices pain in the hand, wrist and arm, as well as weakness of , morning stiffness and swelling as well as difficulty performing functions which require fine touch. Symptoms are infrequently worse at night and can disturb sleep. The problem appears to recently have become worse. Conservative treatment has been prescribed(Tylenol). There is no history of wrist fracture. There is history and/or family history of diabetes. There is history of thyroid disease. There is no family history of carpal tunnel syndrome. He has had surgery for DeQuervain's tendonitis in both wrists. The pain assessment has been reviewed and is correct as stated. The patient's social history, past medical history, family history, medications, allergies and review of systems, entered 9/17/20, have been reviewed, and dated and are recorded in the chart. On examination the patient is Height: 6' 1\" (185.4 cm) tall and weighs Weight: 203 lb (92.1 kg). Respirations are 18 per minute. The patient is well nourished, is oriented to time and place, demonstrates appropriate mood and affect as well as normal gait and station. Cervical range of motion is normal for the patient's stated age and does not produce pain or paresthesias in either upper extremity. There is soft tissue swelling involving the volar aspect of the right wrist.  There is mild right thenar muscle atrophy. The Tinel sign is positive over the median nerve at the  carpal tunnel on right and negative over the ulnar nerve at the elbow bilaterally. The Phalen test is positive on the right at 10 seconds. There is hypalgesia, with associated dysesthesia, on sensory testing over the median nerve distribution. Two point discrimination is normal at the tip of the middle finger. Range of motion of the fingers, thumbs and wrists is normal.  Skin is intact without lesions. Distal circulation is normal.  All joints are stable. Fine motor coordination and gross muscle strength are normal. Deep tendon reflexes are brisk and present bilaterally. There are no subcutaneous masses or enlarged epitrochlear lymph nodes. I have personally reviewed and interpreted all previous external imaging studies, laboratory tests, diagnostic proceedures and medical encounters pertinent to this patient's visit today. Impression:     Carpal tunnel syndrome, right, with clinical evidence of early/mild nerve damage. The nature of this medical problem is fully discussed with the patient, including all treatment options. All questions are answered. The right wrist is prepared with Betadine and alcohol and the carpal tunnel is injected with 2 milliliters of a mixture of 1 ml of 1% lidocaine and 40 mg. of Kenalog. The patient is instructed regarding the expected response and possible reactions to the injection as well as appropriate post injection care. They are to call me in one month unless all symptoms have resolved. The likelihood of recurrence is discussed. If symptoms persist or recur he is instructed to call me.

## 2021-09-29 ENCOUNTER — TELEPHONE (OUTPATIENT)
Dept: ORTHOPEDIC SURGERY | Age: 46
End: 2021-09-29

## 2021-09-29 NOTE — TELEPHONE ENCOUNTER
General Question     Subject: WRIST PAIN  Patient and /or Facility Request: PATIENT STATES PAIN IS WORST SINCE SYLVESTER INJ. STILL HAS THE TINGLING IN HAND/ARM.   Contact Number: [phone]

## 2021-09-30 NOTE — TELEPHONE ENCOUNTER
Please remind patient that I asked him to call me 4 weeks after the injection. It is only 2 weeks now. Improvement is expected over the next 2 weeks.

## 2021-10-15 ENCOUNTER — TELEPHONE (OUTPATIENT)
Dept: ORTHOPEDIC SURGERY | Age: 46
End: 2021-10-15

## 2021-10-15 DIAGNOSIS — G56.01 CARPAL TUNNEL SYNDROME, RIGHT: Primary | ICD-10-CM

## 2021-10-18 NOTE — TELEPHONE ENCOUNTER
EMG order faxed to [facility] Sherryle Moder LM for patient giving him phone # for [facility] and asked that he call them to schedule his EMG. Patient has been advised that Dr. Amarilys Cruz will call them once we receive report to go over test results and treatment options.

## 2021-10-18 NOTE — TELEPHONE ENCOUNTER
He reports he is 40% better following the injection. Please call him and order an EMG, right upper extremity, carpal tunnel syndrome. Thanks.

## 2021-10-25 ENCOUNTER — TELEPHONE (OUTPATIENT)
Dept: ORTHOPEDIC SURGERY | Age: 46
End: 2021-10-25

## 2021-10-25 DIAGNOSIS — G56.01 CARPAL TUNNEL SYNDROME, RIGHT: ICD-10-CM

## 2021-10-25 NOTE — LETTER
[address] [facility] - HAND SURGERY  Surgery Scheduling Form:  DEMOGRAPHICS:                                                                                                                Patient Name:  Steven Landeros Sr  Patient :  1975   Patient SS#:  [id number]    Patient Phone:  [phone] (home)  :    Patient Address:  [address] [place] [address]    PCP:  Tika Mccollum 1620:   Payor/Plan Subscr  Sex Relation Sub. Ins. ID Effective Group Num   1. EVA Hoyos Speaker* 1975 Male Self [id number]                                    [address]   2. Stephanie Guardian* 1975 Male Self [id number] [employer]                                   [address]     DIAGNOSIS & PROCEDURE:                                                                                              Diagnosis:   right Carpal Tunnel Syndrome (354.0)   G56.01  Operation:  right Carpal Tunnel Release  [CPT: 48161]  Location:  [address] [place] [address] OR  Surgeon:  Jessica Jimenez    SCHEDULING INFORMATION:                                                                                         .  Surgeon's Scheduling Instruction:  elective    RN Post-op Appt:  [x] Yes   [] No  Preferred Thursday:   [] Yes   [] No    Requested Date:    OR Time:  2:45 Patient Arrival Time:    OR Time Required:  15  Minutes  Anesthesia:  MAC/TIVA  Equipment:  None  Mini C-Arm:  No   Standard C-Arm:  No  Status:  outpatient                                 COVID    PAT Required:  Yes  Comments:                      Alice Patten MD  10/25/21 12:21 PM    BILLING INFORMATION:                                                                                                   Procedure:       CPT Code Modifier  right Carpal Tunnel Release       .          Pre Operative Physician Prophylaxis Orders - SCIP Protocols      Pre-Operative Antibiotic Order:    Allergies   Allergen Reactions    Amoxicillin Hives     sweats    Penicillins Hives     sweats          [x]  ----  No Antibiotic Ordered       []  ----  Give the following Antibiotic within 1 hour prior to start time:         Ancef 1 gram IV if patient is less than 200 pounds    or       Ancef 2 grams IV if patient is greater than 200 pounds    or      Vancomycin 1 gram IV (over 1 hour) if patient is allergic to           PENICILLINS or CEFALOSPORINS     SURG   11-30                            COVID     11-24                H&P AT PCP       Procedure: right Carpal Tunnel Release      Patient: Rebekah Palacios Sr  :    1975    Physician Signature:     Date: 10/25/21  Time: 12:21 PM

## 2021-10-25 NOTE — TELEPHONE ENCOUNTER
The patient is advised regarding the nature of their medical problem and the results of the EMG test.  All treatment options are fully discussed, including surgery, risks, complications, prognosis and postoperative care. All questions are answered. Surgery will be scheduled at a time convenient to the patient. They are asked to call me if they have any additional questions. The patient understands that the surgery will be done by Dr. Richard Buckley. The patient is instructed to stop taking aspirin 7 days before surgery. They will check with their medical doctor(s), if needed, to ask permission to do so. The patient needs consent forms.

## 2021-11-08 ENCOUNTER — TELEPHONE (OUTPATIENT)
Dept: ORTHOPEDIC SURGERY | Age: 46
End: 2021-11-08

## 2021-11-08 NOTE — TELEPHONE ENCOUNTER
Could you please put a folder at the  with this patients surg time and post op info. He said he never received a folder and he will be by today.    thanks

## 2021-11-09 ENCOUNTER — TELEPHONE (OUTPATIENT)
Dept: ORTHOPEDIC SURGERY | Age: 46
End: 2021-11-09

## 2021-11-09 NOTE — TELEPHONE ENCOUNTER
CPT: 01709  BODY PART: right wrist  STATUS: outpatient  AUTHORIZATION: NPR    Per Ethan Taylor, [facility]

## 2021-11-23 NOTE — PROGRESS NOTES
[facility] time____1320________        Surgery time____1450________    Take the following medications with a sip of water: Follow your MD/Surgeons pre-procedure instructions regarding your medications    Do not eat or drink anything after 12:00 midnight prior to your surgery. This includes water chewing gum, mints and ice chips. You may brush your teeth and gargle the morning of your surgery, but do not swallow the water     Please see your family doctor/pediatrician for a history and physical and/or concerning medications. Bring any test results/reports from your physicians office. If you are under the care of a heart doctor or specialist doctor, please be aware that you may be asked to them for clearance    You may be asked to stop blood thinners such as Coumadin, Plavix, Fragmin, Lovenox, etc., or any anti-inflammatories such as:  Aspirin, Ibuprofen, Advil, Naproxen prior to your surgery. We also ask that you stop any OTC medications such as fish oil, vitamin E, glucosamine, garlic, Multivitamins, COQ 10, etc.    We ask that you do not smoke 24 hours prior to surgery  We ask that you do not  drink any alcoholic beverages 24 hours prior to surgery     You must make arrangements for a responsible adult to take you home after your surgery. For your safety you will not be allowed to leave alone or drive yourself home. Your surgery will be cancelled if you do not have a ride home. Also for your safety, it is strongly suggested that someone stay with you the first 24 hours after your surgery. A parent or legal guardian must accompany a child scheduled for surgery and plan to stay at the hospital until the child is discharged. Please do not bring other children with you. For your comfort, please wear simple loose fitting clothing to the hospital.  Please do not bring valuables.     Do not wear any make-up or nail polish on your fingers or toes      For your safety, please do not wear any jewelry or body piercing's on the day of surgery. All jewelry must be removed. If you have dentures, they will be removed before going to operating room. For your convenience, we will provide you with a container. If you wear contact lenses or glasses, they will be removed, please bring a case for them. If you have a living will and a durable power of  for healthcare, please bring in a copy. As part of our patient safety program to minimize surgical site infections, we ask you to do the following:    · Please notify your surgeon if you develop any illness between         now and the  day of your surgery. · This includes a cough, cold, fever, sore throat, nausea,         or vomiting, and diarrhea, etc.  ·  Please notify your surgeon if you experience dizziness, shortness         of breath or blurred vision between now and the time of your surgery. Do not shave your operative site 96 hours prior to surgery. For face and neck surgery, men may use an electric razor 48 hours   prior to surgery. You may shower the night before surgery or the morning of   your surgery with an antibacterial soap. You will need to bring a photo ID and insurance card    [facility] has an onsite pharmacy, would you like to utilize our pharmacy     If you will be staying overnight and use a C-pap machine, please bring   your C-pap to hospital     Our goal is to provide you with excellent care, therefore, visitors will be limited to two(2) in the room at a time so that we may focus on providing this care for you. Please contact pre-admission testing if you have any further questions. [facility] phone number:  [phone] PAT fax number:  [phone]  Please note these are generalized instructions for all surgical cases, you may be provided with more specific instructions according to your surgery.

## 2021-11-29 ENCOUNTER — ANESTHESIA EVENT (OUTPATIENT)
Dept: OPERATING ROOM | Age: 46
End: 2021-11-29
Payer: OTHER GOVERNMENT

## 2021-11-30 ENCOUNTER — ANESTHESIA (OUTPATIENT)
Dept: OPERATING ROOM | Age: 46
End: 2021-11-30
Payer: OTHER GOVERNMENT

## 2021-11-30 ENCOUNTER — HOSPITAL ENCOUNTER (OUTPATIENT)
Age: 46
Setting detail: OUTPATIENT SURGERY
Discharge: HOME | End: 2021-11-30
Attending: ORTHOPAEDIC SURGERY | Admitting: ORTHOPAEDIC SURGERY
Payer: OTHER GOVERNMENT

## 2021-11-30 VITALS
SYSTOLIC BLOOD PRESSURE: 170 MMHG | BODY MASS INDEX: 24.54 KG/M2 | HEART RATE: 54 BPM | TEMPERATURE: 97.2 F | WEIGHT: 185.19 LBS | DIASTOLIC BLOOD PRESSURE: 90 MMHG | OXYGEN SATURATION: 99 % | HEIGHT: 73 IN | RESPIRATION RATE: 16 BRPM

## 2021-11-30 VITALS — DIASTOLIC BLOOD PRESSURE: 77 MMHG | OXYGEN SATURATION: 100 % | SYSTOLIC BLOOD PRESSURE: 140 MMHG

## 2021-11-30 LAB — SARS-COV-2, NAAT: NOT DETECTED

## 2021-11-30 PROCEDURE — 2580000003 HC RX 258: Performed by: ANESTHESIOLOGY

## 2021-11-30 PROCEDURE — 64721 CARPAL TUNNEL SURGERY: CPT | Performed by: ORTHOPAEDIC SURGERY

## 2021-11-30 PROCEDURE — 87635 SARS-COV-2 COVID-19 AMP PRB: CPT

## 2021-11-30 PROCEDURE — 6370000000 HC RX 637 (ALT 250 FOR IP): Performed by: ANESTHESIOLOGY

## 2021-11-30 PROCEDURE — 2580000003 HC RX 258: Performed by: ORTHOPAEDIC SURGERY

## 2021-11-30 PROCEDURE — 3600000005 HC SURGERY LEVEL 5 BASE: Performed by: ORTHOPAEDIC SURGERY

## 2021-11-30 PROCEDURE — 3600000015 HC SURGERY LEVEL 5 ADDTL 15MIN: Performed by: ORTHOPAEDIC SURGERY

## 2021-11-30 PROCEDURE — 2500000003 HC RX 250 WO HCPCS: Performed by: ORTHOPAEDIC SURGERY

## 2021-11-30 PROCEDURE — 7100000000 HC PACU RECOVERY - FIRST 15 MIN: Performed by: ORTHOPAEDIC SURGERY

## 2021-11-30 PROCEDURE — 7100000011 HC PHASE II RECOVERY - ADDTL 15 MIN: Performed by: ORTHOPAEDIC SURGERY

## 2021-11-30 PROCEDURE — 2709999900 HC NON-CHARGEABLE SUPPLY: Performed by: ORTHOPAEDIC SURGERY

## 2021-11-30 PROCEDURE — 3700000001 HC ADD 15 MINUTES (ANESTHESIA): Performed by: ORTHOPAEDIC SURGERY

## 2021-11-30 PROCEDURE — 3700000000 HC ANESTHESIA ATTENDED CARE: Performed by: ORTHOPAEDIC SURGERY

## 2021-11-30 PROCEDURE — 7100000001 HC PACU RECOVERY - ADDTL 15 MIN: Performed by: ORTHOPAEDIC SURGERY

## 2021-11-30 PROCEDURE — 6360000002 HC RX W HCPCS: Performed by: NURSE ANESTHETIST, CERTIFIED REGISTERED

## 2021-11-30 PROCEDURE — 7100000010 HC PHASE II RECOVERY - FIRST 15 MIN: Performed by: ORTHOPAEDIC SURGERY

## 2021-11-30 PROCEDURE — 2500000003 HC RX 250 WO HCPCS: Performed by: NURSE ANESTHETIST, CERTIFIED REGISTERED

## 2021-11-30 RX ORDER — ACETAMINOPHEN 325 MG/1
650 TABLET ORAL EVERY 4 HOURS PRN
Status: COMPLETED | OUTPATIENT
Start: 2021-11-30 | End: 2021-11-30

## 2021-11-30 RX ORDER — SODIUM CHLORIDE 0.9 % (FLUSH) 0.9 %
10 SYRINGE (ML) INJECTION EVERY 12 HOURS SCHEDULED
Status: DISCONTINUED | OUTPATIENT
Start: 2021-11-30 | End: 2021-11-30 | Stop reason: HOSPADM

## 2021-11-30 RX ORDER — SODIUM CHLORIDE 9 MG/ML
25 INJECTION, SOLUTION INTRAVENOUS PRN
Status: DISCONTINUED | OUTPATIENT
Start: 2021-11-30 | End: 2021-11-30 | Stop reason: HOSPADM

## 2021-11-30 RX ORDER — PROPOFOL 10 MG/ML
INJECTION, EMULSION INTRAVENOUS PRN
Status: DISCONTINUED | OUTPATIENT
Start: 2021-11-30 | End: 2021-11-30 | Stop reason: SDUPTHER

## 2021-11-30 RX ORDER — LIDOCAINE HYDROCHLORIDE 20 MG/ML
INJECTION, SOLUTION EPIDURAL; INFILTRATION; INTRACAUDAL; PERINEURAL PRN
Status: DISCONTINUED | OUTPATIENT
Start: 2021-11-30 | End: 2021-11-30 | Stop reason: SDUPTHER

## 2021-11-30 RX ORDER — MAGNESIUM HYDROXIDE 1200 MG/15ML
LIQUID ORAL CONTINUOUS PRN
Status: COMPLETED | OUTPATIENT
Start: 2021-11-30 | End: 2021-11-30

## 2021-11-30 RX ORDER — SODIUM CHLORIDE 0.9 % (FLUSH) 0.9 %
10 SYRINGE (ML) INJECTION PRN
Status: DISCONTINUED | OUTPATIENT
Start: 2021-11-30 | End: 2021-11-30 | Stop reason: HOSPADM

## 2021-11-30 RX ORDER — SODIUM CHLORIDE 9 MG/ML
INJECTION, SOLUTION INTRAVENOUS CONTINUOUS
Status: DISCONTINUED | OUTPATIENT
Start: 2021-11-30 | End: 2021-11-30 | Stop reason: HOSPADM

## 2021-11-30 RX ADMIN — PROPOFOL 180 MCG/KG/MIN: 10 INJECTION, EMULSION INTRAVENOUS at 14:54

## 2021-11-30 RX ADMIN — SODIUM CHLORIDE: 9 INJECTION, SOLUTION INTRAVENOUS at 14:09

## 2021-11-30 RX ADMIN — PROPOFOL 150 MG: 10 INJECTION, EMULSION INTRAVENOUS at 14:51

## 2021-11-30 RX ADMIN — ACETAMINOPHEN 650 MG: 325 TABLET ORAL at 16:03

## 2021-11-30 RX ADMIN — LIDOCAINE HYDROCHLORIDE 60 MG: 20 INJECTION, SOLUTION EPIDURAL; INFILTRATION; INTRACAUDAL; PERINEURAL at 14:51

## 2021-11-30 ASSESSMENT — PAIN DESCRIPTION - PROGRESSION
CLINICAL_PROGRESSION: GRADUALLY IMPROVING
CLINICAL_PROGRESSION: NOT CHANGED
CLINICAL_PROGRESSION: GRADUALLY WORSENING
CLINICAL_PROGRESSION: NOT CHANGED

## 2021-11-30 ASSESSMENT — PAIN - FUNCTIONAL ASSESSMENT
PAIN_FUNCTIONAL_ASSESSMENT: ACTIVITIES ARE NOT PREVENTED
PAIN_FUNCTIONAL_ASSESSMENT: PREVENTS OR INTERFERES SOME ACTIVE ACTIVITIES AND ADLS

## 2021-11-30 ASSESSMENT — PAIN DESCRIPTION - ONSET
ONSET: ON-GOING

## 2021-11-30 ASSESSMENT — PULMONARY FUNCTION TESTS
PIF_VALUE: 1
PIF_VALUE: 0
PIF_VALUE: 1
PIF_VALUE: 0
PIF_VALUE: 1
PIF_VALUE: 0
PIF_VALUE: 0
PIF_VALUE: 1
PIF_VALUE: 1

## 2021-11-30 ASSESSMENT — PAIN DESCRIPTION - PAIN TYPE
TYPE: SURGICAL PAIN

## 2021-11-30 ASSESSMENT — PAIN DESCRIPTION - ORIENTATION
ORIENTATION: RIGHT

## 2021-11-30 ASSESSMENT — PAIN DESCRIPTION - FREQUENCY
FREQUENCY: CONTINUOUS

## 2021-11-30 ASSESSMENT — PAIN SCALES - GENERAL
PAINLEVEL_OUTOF10: 5
PAINLEVEL_OUTOF10: 8
PAINLEVEL_OUTOF10: 7
PAINLEVEL_OUTOF10: 8
PAINLEVEL_OUTOF10: 8

## 2021-11-30 ASSESSMENT — PAIN DESCRIPTION - DESCRIPTORS
DESCRIPTORS: DISCOMFORT
DESCRIPTORS: PATIENT UNABLE TO DESCRIBE

## 2021-11-30 ASSESSMENT — PAIN DESCRIPTION - LOCATION
LOCATION: WRIST

## 2021-11-30 ASSESSMENT — ENCOUNTER SYMPTOMS: SHORTNESS OF BREATH: 0

## 2021-11-30 NOTE — PROGRESS NOTES
Vss. C/o 8/10 surgical pain. analgesic given. Dressing remains clean dry intact. Fingers are warm, move well, yari well. Family at bedside. Verbalized understanding of discharge instructions. Tolerated sitting up and po fluids and crackers well.

## 2021-11-30 NOTE — PROGRESS NOTES
Pt woke up asking if he was going home with pain medication. States pain is 7/10. Unable to describe pain. Pt laying calm in bed.  md gant called and made aware. States pt can have tylenol in phase 2. No narcotics are to be given.

## 2021-11-30 NOTE — OP NOTE
OPERATIVE REPORT          Patient:  Rickey Hooper Sr    YOB: 1975  Date of Service:  11/30/2021   Location:  [address] OR    Preoperative Diagnosis:    Right carpal tunnel syndrome    Postoperative Diagnosis:    Same    Procedure:    Right carpal tunnel release      Surgeon:    Angie Melendrez. Jeffrey Cervantes MD    Surgical Assistant:    DAVIE Joy Assistant    Anesthesia:   Local with Sedation    Blood Loss:   Minimal    Complications:  None    Tourniquet Time: 3 minutes     Indications:  Mr. Chris Lucero  is a 39y.o. year-old male with Right carpal tunnel syndrome. I have discussed preoperatively with him  the complications, limitations, expectations, alternatives and risks of surgical care, which he has understood. All of his questions have been fully answered, and he has provided written informed consent to proceed. Procedure:   After written consent was obtained and the proper operative site was identified and marked, Mr. Chris Lucero was brought to the operating room, placed in the supine position on the operating room table with the Right arm extended upon a hand table. Under an appropriate level of sedation, local anesthetic (1% Lidocaine and 1/2% Marcaine both without Epinephrine) was instilled in the planned surgical field. His Right upper extremity was prepped and draped in the usual sterile fashion. After Esmarch exsanguination, the pneumotourniquet was inflated to 250 mm of mercury. A 2 cm longitudinal incision was fashioned at the base of the palm, paralleling the longitudinal thenar crease. Dissection was carried carefully through the subcutaneous tissue identifying and protecting the neurovascular structures. The palmar fascia was incised longitudinally, exposing the transverse carpal ligament. The transverse carpal ligament was incised from its proximal to distal most extent, under direct visualization.  The terminal 2 cm of antebrachial fascia was similarly incised under direct visualization. The contents of the carpal tunnel were inspected and found to be free of mass, lesion or other abnormality. Digital palpation revealed no further constriction about the median nerve. The wound was irrigated copiously with sterile saline for irrigation and the pneumotourniquet was deflated after a period of 3 minutes elevation. The fingers were immediately pink & well perfused. Hemostasis was easily obtained with direct pressure and electrocautery and the wound was closed with interrupted sutures. The wound was dressed with adaptic, dry sterile dressings and a bulky soft hand & wrist dressing was applied. Mr. Alicia Ram  was awakened from light sedation, having tolerated the procedure without apparent complication. He  was returned to the recovery room in stable condition. At the conclusion of the procedure all needle, instrument, and sponge counts were correct. Dena Ruffin MD   11/30/2021, 3:05 PM

## 2021-11-30 NOTE — ANESTHESIA POSTPROCEDURE EVALUATION
Department of Anesthesiology  Postprocedure Note    Patient: Iris Buckley  MRN: [id number]  YOB: 1975  Date of evaluation: 11/30/2021  Time:  3:51 PM     Procedure Summary     Date: 11/30/21 Room / Location: [address]    Anesthesia Start: 9528 Anesthesia Stop: 4088    Procedure: RIGHT CARPAL TUNNEL RELEASE (Right Wrist) Diagnosis:       Carpal tunnel syndrome on right      (CARPAL TUNNEL SYNDROME RIGHT)    Surgeons: Eber Chris MD Responsible Provider: Kamran Wagner MD    Anesthesia Type: MAC ASA Status: 2          Anesthesia Type: MAC    Raul Phase I: Raul Score: 10    Raul Phase II:      Last vitals: Reviewed and per EMR flowsheets.        Anesthesia Post Evaluation    Patient location during evaluation: PACU  Patient participation: complete - patient participated  Level of consciousness: awake and alert  Pain score: 3  Airway patency: patent  Nausea & Vomiting: no nausea and no vomiting  Complications: no  Cardiovascular status: blood pressure returned to baseline  Respiratory status: acceptable  Hydration status: euvolemic

## 2021-11-30 NOTE — PROGRESS NOTES
from PACU. Sleepy but oriented. C/o 8/10 surgical right wrist pain. Dressing is clean dry intact.  Fingers are warm, move well, yari well. vss.

## 2021-11-30 NOTE — H&P
Pre-operative Update of H&P:    I  have seen & examined . Lamar Valente Sr related solely to his hand and upper extremity conditions, prior to the scheduled procedure on the date of his surgery. The indications for the planned surgical procedure & and his upper-extremity condition are unchanged.

## 2021-11-30 NOTE — ANESTHESIA PRE PROCEDURE
[facility] Department of Anesthesiology  Pre-Anesthesia Evaluation/Consultation       Name:  Marielle Pardo Sr  : 1975  Age:  39 y.o. MRN:  [id number]  Date: 2021           Surgeon: Surgeon(s):  Sunil Dickey MD    Procedure: Procedure(s):  LEFT FIRST DORSAL COMPARTMENT (DEQUERVAIN'S) RELEASE     Allergies   Allergen Reactions    Amoxicillin Hives    Penicillins Hives     Patient Active Problem List   Diagnosis    Radial styloid tenosynovitis of right hand    Sprain of finger of left hand    Bimalleolar avulsion fracture    Radial styloid tenosynovitis (de quervain)    Carpal tunnel syndrome, right     Past Medical History:   Diagnosis Date    Anal lesion     Asthma     as a child    Condyloma     ED (erectile dysfunction)     Histoplasmosis     HIV disease ([address]     PONV (postoperative nausea and vomiting)     Wears glasses      Past Surgical History:   Procedure Laterality Date    ANKLE SURGERY Right     bone spurs removed    OTHER SURGICAL HISTORY Right 2018    Right First dorsal extensor compartment tendon release.     WRIST SURGERY Left 10/15/2019    LEFT FIRST DORSAL COMPARTMENT (DEQUERVAIN'S) RELEASE performed by Sunil Dickey MD at [facility] History     Tobacco Use    Smoking status: Current Every Day Smoker     Packs/day: 0.50     Years: 20.00     Pack years: 10.00     Types: Cigarettes    Smokeless tobacco: Never Used   Vaping Use    Vaping Use: Never used   Substance Use Topics    Alcohol use: Yes     Comment: socially    Drug use: Yes     Types: Marijuana (Weed)     Comment: Socially     Medications  Current Facility-Administered Medications on File Prior to Visit   Medication Dose Route Frequency Provider Last Rate Last Admin    0.9 % sodium chloride infusion   IntraVENous Continuous Lynne Gage  mL/hr at 21 1409 New Bag at 21 1409    sodium chloride flush 0.9 % injection 10 mL  10 mL IntraVENous 2 times per day Merly Good MD        sodium chloride flush 0.9 % injection 10 mL  10 mL IntraVENous PRN Merly Good MD        0.9 % sodium chloride infusion  25 mL IntraVENous PRN Merly Good MD         Current Outpatient Medications on File Prior to Visit   Medication Sig Dispense Refill    lisinopril (PRINIVIL;ZESTRIL) 10 MG tablet Take 10 mg by mouth nightly      metoprolol succinate (TOPROL XL) 100 MG extended release tablet Take 100 mg by mouth nightly       abacavir-lamivudine (EPZICOM) 600-300 MG per tablet Take 1 tablet by mouth every evening       PREZCOBIX 800-150 MG TABS Take 1 tablet by mouth every evening        No current facility-administered medications for this visit. No current outpatient medications on file. Facility-Administered Medications Ordered in Other Visits   Medication Dose Route Frequency Provider Last Rate Last Admin    0.9 % sodium chloride infusion   IntraVENous Continuous Merly Good  mL/hr at 21 1409 New Bag at 21 1409    sodium chloride flush 0.9 % injection 10 mL  10 mL IntraVENous 2 times per day Merly Good MD        sodium chloride flush 0.9 % injection 10 mL  10 mL IntraVENous PRN Merly Good MD        0.9 % sodium chloride infusion  25 mL IntraVENous PRN Merly Good MD         Vital Signs (Current)   There were no vitals filed for this visit.                                        BP Readings from Last 3 Encounters:   21 (!) 176/88   21 (!) 183/98   21 (!) 198/105     Vital Signs Statistics (for past 48 hrs)     Temp  Av.2 °F (35.7 °C)  Min: 96.2 °F (35.7 °C)   Min taken time: 21 1347  Max: 96.2 °F (35.7 °C)   Max taken time: 21 1347  Pulse  Av  Min: 48   Min taken time: 21 1347  Max: 48   Max taken time: 21 1347  Resp  Av  Min: 18   Min taken time: 21 134 Max: 25   Max taken time: 21 1347  BP  Min: 176/88   Min taken time: 21 1347  Max: 176/88   Max taken time: 21 1347  SpO2  Av %  Min: 99 %   Min taken time: 21 1347  Max: 99 %   Max taken time: 21 1347  BP Readings from Last 3 Encounters:   21 (!) 176/88   21 (!) 183/98   21 (!) 198/105       BMI  There is no height or weight on file to calculate BMI. Estimated body mass index is 24.43 kg/m² as calculated from the following:    Height as of an earlier encounter on 21: 6' 1\" (1.854 m). Weight as of an earlier encounter on 21: 185 lb 3 oz (84 kg). CBC   Lab Results   Component Value Date    WBC 4.9 2019    RBC 3.34 2019    HGB 11.1 2019    HCT 33.2 2019    MCV 99.6 2019    RDW 15.2 2019     2019     CMP    Lab Results   Component Value Date     2019    K 4.1 2019     2019    CO2 24 2019    BUN 17 2019    CREATININE 1.2 2019    GFRAA >60 2019    AGRATIO 0.9 2019    LABGLOM >60 2019    GLUCOSE 106 2019    PROT 7.4 2019    CALCIUM 9.2 2019    BILITOT <0.2 2019    ALKPHOS 134 2019    AST 16 2019    ALT 11 2019     BMP    Lab Results   Component Value Date     2019    K 4.1 2019     2019    CO2 24 2019    BUN 17 2019    CREATININE 1.2 2019    CALCIUM 9.2 2019    GFRAA >60 2019    LABGLOM >60 2019    GLUCOSE 106 2019     POCGlucose  No results for input(s): GLUCOSE in the last 72 hours.    Coags    Lab Results   Component Value Date    PROTIME 13.2 2019    INR 1.16 2019    APTT 32.9      HCG (If Applicable) No results found for: PREGTESTUR, PREGSERUM, HCG, HCGQUANT   ABGs No results found for: PHART, PO2ART, VSR1ZHM, ZNT6UPL, BEART, J7IVCZGZ   Type & Screen (If Applicable)  No results found for: LABABO, [address]                         BMI: Wt Readings from Last 3 Encounters:       NPO Status:                          Anesthesia Evaluation  Patient summary reviewed   history of anesthetic complications: PONV. Airway: Mallampati: II  TM distance: >3 FB   Neck ROM: full  Mouth opening: > = 3 FB Dental: normal exam         Pulmonary: breath sounds clear to auscultation  (+) asthma:     (-) COPD and shortness of breath                           Cardiovascular:        (-) hypertension, valvular problems/murmurs, past MI, CAD, CABG/stent, dysrhythmias,  angina and no hyperlipidemia      Rhythm: regular  Rate: normal                    Neuro/Psych:      (-) seizures, TIA and CVA           GI/Hepatic/Renal:        (-) GERD, PUD, liver disease and no renal disease       Endo/Other:        (-) diabetes mellitus               Abdominal:             Vascular: negative vascular ROS. Other Findings:               Anesthesia Plan      MAC     ASA 2       Induction: intravenous. Anesthetic plan and risks discussed with patient. Plan discussed with CRNA. This pre-anesthesia assessment may be used as a history and physical.    DOS STAFF ADDENDUM:    Pt seen and examined, chart reviewed (including anesthesia, drug and allergy history). No interval changes to history and physical examination. Anesthetic plan, risks, benefits, alternatives, and personnel involved discussed with patient. Patient verbalized an understanding and agrees to proceed.       Leif Hadley MD  November 30, 2021  2:22 PM

## 2021-12-01 ENCOUNTER — TELEPHONE (OUTPATIENT)
Dept: ORTHOPEDIC SURGERY | Age: 46
End: 2021-12-01

## 2021-12-01 DIAGNOSIS — G56.01 CARPAL TUNNEL SYNDROME, RIGHT: Primary | ICD-10-CM

## 2021-12-01 RX ORDER — HYDROCODONE BITARTRATE AND ACETAMINOPHEN 5; 325 MG/1; MG/1
1 TABLET ORAL EVERY 6 HOURS PRN
Qty: 10 TABLET | Refills: 0 | Status: CANCELLED | OUTPATIENT
Start: 2021-12-01 | End: 2021-12-04

## 2021-12-01 RX ORDER — HYDROCODONE BITARTRATE AND ACETAMINOPHEN 5; 325 MG/1; MG/1
1 TABLET ORAL EVERY 6 HOURS PRN
Qty: 10 TABLET | Refills: 0 | Status: SHIPPED | OUTPATIENT
Start: 2021-12-01 | End: 2021-12-04

## 2021-12-01 NOTE — TELEPHONE ENCOUNTER
General Question     Subject: MEDICATION  FOR PAIN  Patient and /or Facility Request: Esperanza Joaquin  Contact Number: [phone]    PATIENT HAD SURGERY YESTERDAY, November 30, 2021    PATIENT WAS GIVEN 2 TYLENOL WHEN HE LEFT THE HOSPITAL    HE WAS INSTRUCTED TO TAKE TYLENOL, AND IT'S NOT WORKING. PATIENT CANNOT TAKE IBUPROFEN BECAUSE OF HIS BLOOD PRESSURE. PATIENT CAN'T DO EXERCISE DUE TO THE AMOUNT OF PAIN HE'S IN. PATIENT WOULD LIKE SOME KIND OF PAIN MEDICATION TO BE CALLED IN  FOR THE PAIN    PLEASE CALL BACK PATIENT AT THE ABOVE NUMBER.

## 2021-12-06 ENCOUNTER — OFFICE VISIT (OUTPATIENT)
Dept: ORTHOPEDIC SURGERY | Age: 46
End: 2021-12-06

## 2021-12-06 VITALS — BODY MASS INDEX: 24.52 KG/M2 | HEIGHT: 73 IN | RESPIRATION RATE: 16 BRPM | WEIGHT: 185 LBS

## 2021-12-06 DIAGNOSIS — G56.01 CARPAL TUNNEL SYNDROME, RIGHT: Primary | ICD-10-CM

## 2021-12-06 PROCEDURE — 99024 POSTOP FOLLOW-UP VISIT: CPT | Performed by: ORTHOPAEDIC SURGERY

## 2021-12-06 NOTE — PATIENT INSTRUCTIONS
Postoperative Instructions After Carpal Tunnel Release    Dr. Ray Elliott        1. After bandages are removed one week from surgery, you may chose to wear a small bandage over the incision if you wish, though you do not need to. 2. Keep incision dry until sutures have fully dissolved  or it has been 12 - 14 days since your surgery. Thereafter, you may wash with mild soap and water and shower normally. 3. Work hard on motion of the fingers and wrist, straightening each finger fully and bending each finger fully, bending wrist forward and bending wrist backwards. Do not be concerned if you experience discomfort. This will not damage the surgery. 4. You may begin using the hand as it feels comfortable beginning 12 - 14 days from the day of surgery. You may not feel entirely comfortable gripping or lifting heavy objects for several weeks. 5. You may expect to see some skin peel off around the incision. You may be left with a small area of pink baby skin. This is quite normal.  6. Once your stiches have fully disappeared & skin appears normal, you should begin gently massaging the incision with Vitamin E (may use Vitamin E lotion or contents of Vitamin E capsule). Thank you for choosing [facility]) Physicians for your Hand and Upper Extremity needs. If we can be of any further assistance to you, please do not hesitate to contact us.     Office Phone Number:  [phone]  or  [phone]

## 2021-12-06 NOTE — PROGRESS NOTES
Mr. Shalini Rutledge returns today in follow-up of his recent right Carpal Tunnel Release done approximately 1 week ago. He has done well noting mild discomfort and no other reported complications. He notes pre-operative symptoms to be partially resolved at this time. Physical Exam:  Skin incision is healing well, without erythema, drainage or sign of infection. Digital range of motion is Full. Wrist range of motion is Full. Sensation is improved from preoperatvely  Vascular examination reveals normal, good capillary refill and good color. Swelling is minimal.  Sensory and Motor Median Nerve function is intact. Impression:  Mr. Shalini Rutledge is doing well after recent right Carpal Tunnel Release. Plan:  Mr. Shalini Rutledge is instructed in work on Active & Passive range of motion of the digits, wrist, & elbow. These modalities were specifically demonstrated to him today. We discussed the appropriateness of gradual resumption of use of the operated hand and the return to normal use as comfort allows. He is given instructions regarding management of the fresh surgical incision and progressive use of desensitization and tissue massage techniques. We discussed the appropriate expectations and timeline for symptom improvement. He is provided a written patient instruction sheet titled: Postoperative Instructions After Carpal Tunnel Release. I have asked Mr. Yanni Liang  to follow-up with me or contact me by telephone over the next 2-4 weeks if his symptoms have not fully resolved or if he has not regained full & painless return of function. He is also specifically instructed to return to the office or call for an appointment sooner if his symptoms are changing or worsening prior to that time.

## 2021-12-13 ENCOUNTER — TELEPHONE (OUTPATIENT)
Dept: ORTHOPEDIC SURGERY | Age: 46
End: 2021-12-13

## 2021-12-13 NOTE — TELEPHONE ENCOUNTER
Other PATIENT CALLED STATES THAT HE NEEDS A RETURN TO WORK NOTE. STATES THAT HE CAN EITHER PICK IT UP OR IT CAN BE PLACED IN MY CHART.  PLEASE CALL TO [facility] [phone]

## 2022-02-20 ENCOUNTER — HOSPITAL ENCOUNTER (EMERGENCY)
Age: 47
Discharge: HOME | End: 2022-02-20
Attending: EMERGENCY MEDICINE
Payer: OTHER GOVERNMENT

## 2022-02-20 VITALS
SYSTOLIC BLOOD PRESSURE: 127 MMHG | HEIGHT: 73 IN | DIASTOLIC BLOOD PRESSURE: 70 MMHG | RESPIRATION RATE: 20 BRPM | OXYGEN SATURATION: 99 % | WEIGHT: 199.3 LBS | BODY MASS INDEX: 26.41 KG/M2 | TEMPERATURE: 98.4 F | HEART RATE: 63 BPM

## 2022-02-20 DIAGNOSIS — S46.812A STRAIN OF LEFT TRAPEZIUS MUSCLE, INITIAL ENCOUNTER: Primary | ICD-10-CM

## 2022-02-20 PROCEDURE — 6360000002 HC RX W HCPCS: Performed by: EMERGENCY MEDICINE

## 2022-02-20 PROCEDURE — 96372 THER/PROPH/DIAG INJ SC/IM: CPT

## 2022-02-20 PROCEDURE — 6370000000 HC RX 637 (ALT 250 FOR IP): Performed by: EMERGENCY MEDICINE

## 2022-02-20 PROCEDURE — 99283 EMERGENCY DEPT VISIT LOW MDM: CPT

## 2022-02-20 RX ORDER — CYCLOBENZAPRINE HCL 5 MG
5 TABLET ORAL 2 TIMES DAILY PRN
Qty: 10 TABLET | Refills: 0 | Status: SHIPPED | OUTPATIENT
Start: 2022-02-20 | End: 2022-03-02

## 2022-02-20 RX ORDER — LIDOCAINE 4 G/G
1 PATCH TOPICAL DAILY
Status: DISCONTINUED | OUTPATIENT
Start: 2022-02-20 | End: 2022-02-20 | Stop reason: HOSPADM

## 2022-02-20 RX ORDER — LIDOCAINE 50 MG/G
1 PATCH TOPICAL DAILY
Qty: 10 PATCH | Refills: 0 | Status: SHIPPED | OUTPATIENT
Start: 2022-02-20 | End: 2022-03-02

## 2022-02-20 RX ORDER — HYDROCHLOROTHIAZIDE 25 MG/1
25 TABLET ORAL DAILY
COMMUNITY

## 2022-02-20 RX ORDER — ACETAMINOPHEN 500 MG
1000 TABLET ORAL ONCE
Status: COMPLETED | OUTPATIENT
Start: 2022-02-20 | End: 2022-02-20

## 2022-02-20 RX ORDER — KETOROLAC TROMETHAMINE 30 MG/ML
15 INJECTION, SOLUTION INTRAMUSCULAR; INTRAVENOUS ONCE
Status: COMPLETED | OUTPATIENT
Start: 2022-02-20 | End: 2022-02-20

## 2022-02-20 RX ORDER — LOSARTAN POTASSIUM 100 MG/1
100 TABLET ORAL DAILY
COMMUNITY

## 2022-02-20 RX ORDER — METOPROLOL SUCCINATE 100 MG/1
100 TABLET, EXTENDED RELEASE ORAL DAILY
COMMUNITY

## 2022-02-20 RX ORDER — CYCLOBENZAPRINE HCL 10 MG
10 TABLET ORAL ONCE
Status: COMPLETED | OUTPATIENT
Start: 2022-02-20 | End: 2022-02-20

## 2022-02-20 RX ADMIN — CYCLOBENZAPRINE 10 MG: 10 TABLET, FILM COATED ORAL at 14:21

## 2022-02-20 RX ADMIN — KETOROLAC TROMETHAMINE 15 MG: 30 INJECTION, SOLUTION INTRAMUSCULAR at 14:21

## 2022-02-20 RX ADMIN — ACETAMINOPHEN 1000 MG: 500 TABLET ORAL at 14:21

## 2022-02-20 ASSESSMENT — PAIN SCALES - GENERAL
PAINLEVEL_OUTOF10: 8
PAINLEVEL_OUTOF10: 8

## 2022-02-20 ASSESSMENT — PAIN DESCRIPTION - LOCATION: LOCATION: SHOULDER

## 2022-02-20 ASSESSMENT — PAIN DESCRIPTION - PAIN TYPE: TYPE: ACUTE PAIN

## 2022-02-20 ASSESSMENT — PAIN DESCRIPTION - ORIENTATION: ORIENTATION: LEFT

## 2022-02-20 ASSESSMENT — PAIN DESCRIPTION - DESCRIPTORS: DESCRIPTORS: SHARP

## 2022-02-20 NOTE — ED PROVIDER NOTES
[address] COMPLAINT  Shoulder Pain       HISTORY OF PRESENT ILLNESS  Antnoieta Wood is a 55 y.o. male who presents to the ED with complaint of left-sided shoulder pain. The patient states he works for [employer] and is frequently moving heavy objects. After a long day at work he developed left-sided shoulder pain which localizes to the left trapezius. The pain is dull and sometimes sharp, moderate, worse with movement, better with rest, radiates down the ipsilateral arm. Denies neck pain, weakness, numbness, chest pain, shortness of breath, fever, nausea, vomiting, diarrhea, abdominal pain. No other complaints, modifying factors or associated symptoms. I have reviewed the following from the nursing documentation:    Past Medical History:   Diagnosis Date    Anal lesion     Asthma     as a child    Condyloma     ED (erectile dysfunction)     Histoplasmosis     HIV disease ([facility] [address]     Hypertension     PONV (postoperative nausea and vomiting)     Wears glasses      Past Surgical History:   Procedure Laterality Date    ANKLE SURGERY Right     bone spurs removed    CARPAL TUNNEL RELEASE Right 11/30/2021    RIGHT CARPAL TUNNEL RELEASE performed by Loly Lewis MD at [place] [id number] Right 01/23/2018    Right First dorsal extensor compartment tendon release.     WRIST SURGERY Left 10/15/2019    LEFT FIRST DORSAL COMPARTMENT (DEQUERVAIN'S) RELEASE performed by Loly [facility] MD at [facility] History   Problem Relation Age of Onset    Diabetes Mother     Heart Attack Mother     Heart Disease Mother      Social History     Socioeconomic History    Marital status:      Spouse name: Not on file    Number of children: Not on file    Years of education: Not on file    Highest education level: Not on file   Occupational History    Occupation: [employer]   Tobacco Use    Smoking status: Current Every Day Smoker     Packs/day: 0.50 Wilbert Montalvo MD        acetaminophen (TYLENOL) tablet 1,000 mg  1,000 mg Oral Once Neli Templeton MD         Current Outpatient Medications   Medication Sig Dispense Refill    abacavir-lamivudine (EPZICOM) 600-300 MG per tablet Take 1 tablet by mouth every evening       PREZCOBIX 800-150 MG TABS Take 1 tablet by mouth every evening        Allergies   Allergen Reactions    Amoxicillin Hives    Penicillins Hives       REVIEW OF SYSTEMS  10 systems reviewed, pertinent positives and negatives per HPI, otherwise noted to be negative. PHYSICAL EXAM  ED Triage Vitals [02/20/22 1359]   BP Temp Temp Source Pulse Resp SpO2 Height Weight   (!) 171/82 98.4 °F (36.9 °C) Oral 75 17 98 % 6' 1\" (1.854 m) 199 lb 4.7 oz (90.4 kg)     General appearance: Awake and alert. Cooperative. No acute distress. HENT: Normocephalic. Atraumatic. Mucous membranes are moist.  Neck: Supple. No C spine TTP, step off, or gross deformity. Eyes: PERRL. EOMI. Heart/Chest: RRR. No murmurs. Lungs: Respirations unlabored. CTAB. Good air exchange. Speaking comfortably in full sentences. Abdomen: Soft. Non-tender. Non-distended. No rebound or guarding. Musculoskeletal: There is mild tenderness to palpation in the left trapezius muscle. There is no bony tenderness of the left shoulder. No deformity. No tenderness in the extremities. All extremities neurovascularly intact. Left cardinal hand functions are intact. Sensation is intact R/M/U.  2+ radial pulse. Skin: Warm and dry. No acute rashes. Neurological: Alert and oriented. CN II-XII intact. Gait normal.  Psychiatric: Mood/affect: normal      ED COURSE/MDM  Patient seen and evaluated. Old records reviewed. Labs and imaging reviewed and results discussed with patient/family to extent possible. 71-year-old male presents with left shoulder pain which in fact localizes to the left trapezius muscle. I suspect a trapezius muscle strain or spasm.   Patient is hypertensive with otherwise unremarkable vital signs. He is already being managed for his hypertension by his PCP. The left upper extremity is neurovascularly intact. Patient was treated with Toradol, lidocaine patch, Flexeril, and Tylenol with subsequent improvement in his symptoms. We'll discharge to home with prescriptions for lidocaine patch, Flexeril, and instructions to use acetaminophen. Precautions regarding the sedating nature of Flexeril were communicated. Close follow-up with PCP in several days. Usual strict return precautions for new or worsening symptoms communicated. During the patient's ED course, the patient was given:  Medications   ketorolac (TORADOL) injection 15 mg (has no administration in time range)   lidocaine 4 % external patch 1 patch (has no administration in time range)   acetaminophen (TYLENOL) tablet 1,000 mg (has no administration in time range)        All questions were answered and the patient/family expressed understanding and agreement with the plan. PROCEDURES  None    CRITICAL CARE  N/A    CLINICAL IMPRESSION  1. Strain of left trapezius muscle, initial encounter        DISPOSITION   discharge     Hilton Chu MD    Note: This chart was created using voice recognition dictation software. Efforts were made by me to ensure accuracy, however some errors may be present due to limitations of this technology and occasionally words are not transcribed correctly.         Hilton Chu MD  02/20/22 8713

## 2022-02-21 NOTE — ED NOTES
left shoulder pain intermittently since 2/16/22.  no known injury. pain now at 8.   no OTC pain meds today. 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        Marga Michel RN  02/20/22 6862

## 2022-02-21 NOTE — ED NOTES
Discharge instructions with pt. Explained rx's. Left upper back/shoulder area pain at 4. Home ambulatory. Gait steady.         Jennifer Carlisle RN  02/20/22 9480

## 2022-08-14 ENCOUNTER — HOSPITAL ENCOUNTER (EMERGENCY)
Age: 47
Discharge: HOME | End: 2022-08-14
Payer: OTHER GOVERNMENT

## 2022-08-14 VITALS
HEIGHT: 73 IN | TEMPERATURE: 98.4 F | DIASTOLIC BLOOD PRESSURE: 99 MMHG | RESPIRATION RATE: 16 BRPM | SYSTOLIC BLOOD PRESSURE: 185 MMHG | OXYGEN SATURATION: 100 % | WEIGHT: 217 LBS | HEART RATE: 66 BPM | BODY MASS INDEX: 28.76 KG/M2

## 2022-08-14 DIAGNOSIS — G89.29 ACUTE EXACERBATION OF CHRONIC LOW BACK PAIN: Primary | ICD-10-CM

## 2022-08-14 DIAGNOSIS — F17.200 SMOKER: ICD-10-CM

## 2022-08-14 DIAGNOSIS — R03.0 ELEVATED BLOOD PRESSURE READING: ICD-10-CM

## 2022-08-14 DIAGNOSIS — M54.50 ACUTE EXACERBATION OF CHRONIC LOW BACK PAIN: Primary | ICD-10-CM

## 2022-08-14 PROCEDURE — 6360000002 HC RX W HCPCS

## 2022-08-14 PROCEDURE — 99284 EMERGENCY DEPT VISIT MOD MDM: CPT

## 2022-08-14 PROCEDURE — 96372 THER/PROPH/DIAG INJ SC/IM: CPT

## 2022-08-14 RX ORDER — KETOROLAC TROMETHAMINE 30 MG/ML
30 INJECTION, SOLUTION INTRAMUSCULAR; INTRAVENOUS ONCE
Status: COMPLETED | OUTPATIENT
Start: 2022-08-14 | End: 2022-08-14

## 2022-08-14 RX ORDER — CYCLOBENZAPRINE HCL 5 MG
5 TABLET ORAL 3 TIMES DAILY PRN
Qty: 20 TABLET | Refills: 0 | Status: SHIPPED | OUTPATIENT
Start: 2022-08-14

## 2022-08-14 RX ORDER — ORPHENADRINE CITRATE 30 MG/ML
60 INJECTION INTRAMUSCULAR; INTRAVENOUS ONCE
Status: COMPLETED | OUTPATIENT
Start: 2022-08-14 | End: 2022-08-14

## 2022-08-14 RX ORDER — METHYLPREDNISOLONE 4 MG/1
TABLET ORAL
Qty: 1 KIT | Refills: 0 | Status: SHIPPED | OUTPATIENT
Start: 2022-08-14 | End: 2022-08-20

## 2022-08-14 RX ORDER — LIDOCAINE 50 MG/G
1 PATCH TOPICAL EVERY 24 HOURS
Qty: 30 PATCH | Refills: 0 | Status: SHIPPED | OUTPATIENT
Start: 2022-08-14 | End: 2022-09-13

## 2022-08-14 RX ADMIN — ORPHENADRINE CITRATE 60 MG: 30 INJECTION INTRAMUSCULAR; INTRAVENOUS at 17:14

## 2022-08-14 RX ADMIN — KETOROLAC TROMETHAMINE 30 MG: 30 INJECTION, SOLUTION INTRAMUSCULAR at 17:13

## 2022-08-14 ASSESSMENT — PAIN SCALES - GENERAL
PAINLEVEL_OUTOF10: 8

## 2022-08-14 ASSESSMENT — PAIN DESCRIPTION - DESCRIPTORS: DESCRIPTORS: ACHING

## 2022-08-14 ASSESSMENT — ENCOUNTER SYMPTOMS
SORE THROAT: 0
SHORTNESS OF BREATH: 0
DIARRHEA: 0
RHINORRHEA: 0
CONSTIPATION: 0
ABDOMINAL PAIN: 0
COUGH: 0
BACK PAIN: 1
NAUSEA: 0
EYE PAIN: 0
VOMITING: 0

## 2022-08-14 ASSESSMENT — PAIN - FUNCTIONAL ASSESSMENT: PAIN_FUNCTIONAL_ASSESSMENT: 0-10

## 2022-08-14 ASSESSMENT — PAIN DESCRIPTION - LOCATION: LOCATION: BACK

## 2022-08-14 ASSESSMENT — PAIN DESCRIPTION - ORIENTATION: ORIENTATION: MID

## 2022-08-14 NOTE — Clinical Note
Seraifn Neumann Sr was seen and treated in our emergency department on 8/14/2022. He may return to work on 08/15/2022. If you have any questions or concerns, please don't hesitate to call.       Nancy Cummings RN

## 2022-08-14 NOTE — ED PROVIDER NOTES
[address] [facility]        Pt Name: Micheline Stanford  MRN: [id number]  [place] 1975  Date of evaluation: 8/14/2022  Provider: RACHELLE Colbert - CNP  PCP: Jeimy Ceron  Note Started: 5:17 PM EDT      TASHA. I have evaluated this patient. My supervising physician was available for consultation. Triage CHIEF COMPLAINT       Chief Complaint   Patient presents with    Back Pain     Back pain started last night, no injury         HISTORY OF PRESENT ILLNESS   (Location/Symptom, Timing/Onset, Context/Setting, Quality, Duration, Modifying Factors, Severity)  Note limiting factors. Chief Complaint: Above    Micheline Stanford is a 55 y.o. male who presents to the ED for evaluation of back pain. States he has a history of chronic back pain and acutely exacerbated this at work. He believes he has a spasm. Denies any trauma. Pain is primarily to his left lower back to the musculature, but radiates across to the other side as well. Denies any fevers or chills. Daljit Dunn Sr specifically denies alcoholism, diabetes, being immunocompromised, spinal injections, spinal surgery, IV drug use, saddle anesthesia or numbness, fevers or infections, recent catheters or urinary procedures, and incontinence    Nursing Notes were all reviewed and agreed with or any disagreements were addressed in the HPI. REVIEW OF SYSTEMS    (2-9 systems for level 4, 10 or more for level 5)     Review of Systems   Constitutional:  Negative for chills, diaphoresis and fever. HENT:  Negative for congestion, rhinorrhea and sore throat. Eyes:  Negative for pain and visual disturbance. Respiratory:  Negative for cough and shortness of breath. Cardiovascular:  Negative for chest pain and leg swelling. Gastrointestinal:  Negative for abdominal pain, constipation, diarrhea, nausea and vomiting.    Genitourinary:  Negative for difficulty urinating, dysuria, frequency, hematuria and testicular pain. Musculoskeletal:  Positive for back pain. Negative for neck pain. Skin:  Negative for rash and wound. Neurological:  Negative for dizziness and light-headedness. PAST MEDICAL HISTORY     Past Medical History:   Diagnosis Date    Anal lesion     Asthma     as a child    Condyloma     ED (erectile dysfunction)     Histoplasmosis     HIV disease ([facility] [address]     Hypertension     PONV (postoperative nausea and vomiting)     Vitamin D deficiency     Wears glasses        SURGICAL HISTORY     Past Surgical History:   Procedure Laterality Date    ANKLE SURGERY Right     bone spurs removed    CARPAL TUNNEL RELEASE Right 11/30/2021    RIGHT CARPAL TUNNEL RELEASE performed by Mario Medeiros MD at [place] [address] Right 01/23/2018    Right First dorsal extensor compartment tendon release.     WRIST SURGERY Left 10/15/2019    LEFT FIRST DORSAL COMPARTMENT (DEQUERVAIN'S) RELEASE performed by Mario Medeiros MD at [address] [place] [address]       Previous Medications    ABACAVIR-LAMIVUDINE (EPZICOM) 600-300 MG PER TABLET    Take 1 tablet by mouth every evening     HYDROCHLOROTHIAZIDE (HYDRODIURIL) 25 MG TABLET    Take 25 mg by mouth daily    LOSARTAN (COZAAR) 100 MG TABLET    Take 100 mg by mouth daily    METOPROLOL SUCCINATE (TOPROL XL) 100 MG EXTENDED RELEASE TABLET    Take 100 mg by mouth daily    NUTRITIONAL SUPPLEMENTS (VITAMIN D BOOSTER PO)    Take 1,000 Units by mouth daily    PREZCOBIX 800-150 MG TABS    Take 1 tablet by mouth every evening        ALLERGIES     Amoxicillin and Penicillins    FAMILYHISTORY       Family History   Problem Relation Age of Onset    Diabetes Mother     Heart Attack Mother     Heart Disease Mother         SOCIAL HISTORY       Social History     Socioeconomic History    Marital status:      Spouse name: None    Number of children: None    Years of education: None    Highest education level: None   Occupational History    Occupation: [employer]   Tobacco Use    Smoking status: Every Day     Packs/day: 0.50     Years: 20.00     Pack years: 10.00     Types: Cigarettes    Smokeless tobacco: Never   Vaping Use    Vaping Use: Never used   Substance and Sexual Activity    Alcohol use: Yes     Comment: once a month    Drug use: Yes     Types: Marijuana (Weed)     Comment: marijuana    Sexual activity: Yes     Partners: Female       SCREENINGS    [place] Coma Scale  Eye Opening: Spontaneous  Best Verbal Response: Oriented  Best Motor Response: Obeys commands  [place] Coma Scale Score: 15        PHYSICAL EXAM    (up to 7 for level 4, 8 or more for level 5)     ED Triage Vitals [08/14/22 1613]   BP Temp Temp Source Heart Rate Resp SpO2 Height Weight   (!) 199/96 98.4 °F (36.9 °C) Oral 70 16 100 % 6' 1\" (1.854 m) 217 lb (98.4 kg)       Physical Exam  Vitals and nursing note reviewed. Constitutional:       General: He is not in acute distress. Appearance: Normal appearance. He is obese. He is not ill-appearing or diaphoretic. HENT:      Head: Normocephalic and atraumatic. Right Ear: External ear normal.      Left Ear: External ear normal.      Nose: Nose normal. No congestion or rhinorrhea. Mouth/Throat:      Mouth: Mucous membranes are moist.      Pharynx: Oropharynx is clear. No oropharyngeal exudate or posterior oropharyngeal erythema. Eyes:      General: No scleral icterus. Right eye: No discharge. Left eye: No discharge. Extraocular Movements: Extraocular movements intact. Conjunctiva/sclera: Conjunctivae normal.      Pupils: Pupils are equal, round, and reactive to light. Cardiovascular:      Rate and Rhythm: Normal rate and regular rhythm. Pulses: Normal pulses. Heart sounds: Normal heart sounds. No murmur heard. No friction rub. No gallop. Pulmonary:      Effort: Pulmonary effort is normal. No respiratory distress. Breath sounds: Normal breath sounds. No stridor. radiographic images are visualized andpreliminarily interpreted by the  ED Provider with the below findings:        Interpretation perthe Radiologist below, if available at the time of this note:    No orders to display     No results found. PROCEDURES   Unless otherwise noted below, none     Procedures    CRITICAL CARE TIME   N/A    CONSULTS:  None      EMERGENCY DEPARTMENT COURSE and DIFFERENTIAL DIAGNOSIS/MDM:   Vitals:    Vitals:    08/14/22 1613 08/14/22 1709   BP: (!) 199/96 (!) 185/99   Pulse: 70 66   Resp: 16    Temp: 98.4 °F (36.9 °C)    TempSrc: Oral    SpO2: 100% 100%   Weight: 217 lb (98.4 kg)    Height: 6' 1\" (1.854 m)        Patient was given thefollowing medications:  Medications   orphenadrine (NORFLEX) injection 60 mg (60 mg IntraMUSCular Given 8/14/22 1714)   ketorolac (TORADOL) injection 30 mg (30 mg IntraMUSCular Given 8/14/22 1713)         Is this patient to be included in the SEP-1 Core Measure due to severe sepsis or septic shock? No   Exclusion criteria - the patient is NOT to be included for SEP-1 Core Measure due to: Infection is not suspected    Differential Diagnosis: Spinal Epidural Abscess, Vertebral Osteomyelitis, Cauda Equina Syndrome, Spinal Cord Compression, Conus Medullaris Syndrome, ruptured/dissecting Abdominal Aortic Aneurysm, Metastases to the back, Kidney Stone, Pyelonephritis, Fracture or dislocation, other    55 y.o. male presents with complaint of back pain. There is no tenderness to palpation in the axial skeleton. No history of trauma. Presentation not consistent with fracture or dislocation. Patient does not have active malignancy. Patient is not anticoagulated. Patient is fully neurovascularly intact. There are no back pain red flag signs or symptoms.       Red Flag Score <= 3 AND neuro exam intact or at baseline - consider avoiding MRI and other imaging    I completed a structured, evidence-based clinical evaluation to screen for acute nontraumatic spinal emergencies. The patient has a normal detailed neurological exam and low red flag score. The evidence indicates that the patient is very low risk for an acute spinal emergency and this is consistent with my clinical intuition. The risk of further work-up is higher than the likelihood of the patient having a spinal epidural abscess, discitis, osteomyelitis, epidural abscess, cauda equina syndrome, or other emergent spinal condition. It is, therefore, in the patient's best interest not to do additional emergent testing at this time. I have discussed with the patient my clinical impression and the result of an evidence-based clinical evaluation to screen for spinal emergencies, as well as the risk of further testing and hospitalization. The evidence shows that the risk for an acute spinal emergency is less than 1%. Although the risk of an acute spinal emergency has not been completely eliminated, the risks of further testing likely exceed any potential benefit, and the patient agrees with not pursuing further emergent evaluation for causes of back pain at this time. The patient is not taking blood pressure medication today. He does have a PCP to follow-up with. We discussed smoking cessation for roughly 5 minutes. The patient is at low risk for mortality based on demographic, history and clinical factors. Given the best available information and clinical assessment, I estimate the risk of hospitalization to be greater than risk of treatment at home. I have explained to the patient that the risk could rapidly change, given precautions for return and instructions. Explained to patient that the risk for mortality is low based on demographic, history and clinical factors. I discussed with patient the results of evaluation in the ED, diagnosis, care, and prognosis. The plan is to discharge to home. Patient is in agreement with plan and questions have been answered.       I also discussed with patient the reasons which may require a return visit and the importance of follow-up care. The patient is well-appearing, nontoxic, and improved at the time of discharge. Patient agrees to call to arrange follow-up care as directed. Patient understands to return immediately for worsening/change in symptoms. FINAL IMPRESSION      1. Acute exacerbation of chronic low back pain    2. Smoker    3. Elevated blood pressure reading          DISPOSITION/PLAN   DISPOSITION Decision To Discharge 08/14/2022 04:59:24 PM      PATIENT REFERREDTO:  [address]  [address] [place] [address]    Schedule an appointment as soon as possible for a visit in 3 days  Follow up within 3 days, Return to ED sooner if symptoms worsen    [address]  [phone]  Call   As needed, Follow up on your recent ED visit      DISCHARGE MEDICATIONS:  New Prescriptions    CYCLOBENZAPRINE (FLEXERIL) 5 MG TABLET    Take 1 tablet by mouth 3 times daily as needed for Muscle spasms    LIDOCAINE (LIDODERM) 5 %    Place 1 patch onto the skin every 24 hours Place 1 patch onto the skin daily 12 hours on, 12 hours off. METHYLPREDNISOLONE (MEDROL DOSEPACK) 4 MG TABLET    Take by mouth.        DISCONTINUED MEDICATIONS:  Discontinued Medications    No medications on file              (Please note that portions ofthis note were completed with a voice recognition program.  Efforts were made to edit the dictations but occasionally words are mis-transcribed.)    RACHELLE Elizabeth CNP (electronically signed)             RACHELLE Elizabeth CNP  08/14/22 0083

## 2022-08-14 NOTE — DISCHARGE INSTRUCTIONS
You have been prescribed medication, flexeril, that causes drowsiness. While this is not a problem under normal circumstances, when taken with alcohol or while driving or operating heavy machinery, this medicine could cause you to become too sleepy and/or hurt yourself or others. Therefore, it is very important that you DO NOT DRIVE, DRINK ALCOHOL, OR OPERATE HEAVY MACHINERY WHILE TAKING THE MEDICINE(S) LISTED ABOVE.

## 2022-08-14 NOTE — ED TRIAGE NOTES
Pt to ED with complaint of back pain that started all the sudden last night. Pt states pain is in middle of his back. Denies any radiating pain down leg. Pt ambulatory to anyi room. Denies any injury.

## 2022-11-22 ENCOUNTER — APPOINTMENT (OUTPATIENT)
Dept: GENERAL RADIOLOGY | Age: 47
End: 2022-11-22
Payer: COMMERCIAL

## 2022-11-22 ENCOUNTER — HOSPITAL ENCOUNTER (EMERGENCY)
Age: 47
Discharge: HOME | End: 2022-11-23
Payer: COMMERCIAL

## 2022-11-22 VITALS
HEIGHT: 73 IN | RESPIRATION RATE: 22 BRPM | BODY MASS INDEX: 30.07 KG/M2 | OXYGEN SATURATION: 99 % | HEART RATE: 70 BPM | DIASTOLIC BLOOD PRESSURE: 95 MMHG | WEIGHT: 226.85 LBS | TEMPERATURE: 97.8 F | SYSTOLIC BLOOD PRESSURE: 185 MMHG

## 2022-11-22 DIAGNOSIS — N17.9 ACUTE KIDNEY INJURY (HCC): ICD-10-CM

## 2022-11-22 DIAGNOSIS — M54.12 CERVICAL RADICULOPATHY: Primary | ICD-10-CM

## 2022-11-22 LAB
BASOPHILS ABSOLUTE: 0.1 K/UL (ref 0–0.2)
BASOPHILS RELATIVE PERCENT: 1.2 %
EOSINOPHILS ABSOLUTE: 0.3 K/UL (ref 0–0.6)
EOSINOPHILS RELATIVE PERCENT: 4.7 %
HCT VFR BLD CALC: 38.4 % (ref 40.5–52.5)
HEMOGLOBIN: 12.7 G/DL (ref 13.5–17.5)
LYMPHOCYTES ABSOLUTE: 2.5 K/UL (ref 1–5.1)
LYMPHOCYTES RELATIVE PERCENT: 39.1 %
MCH RBC QN AUTO: 34.7 PG (ref 26–34)
MCHC RBC AUTO-ENTMCNC: 33.1 G/DL (ref 31–36)
MCV RBC AUTO: 104.8 FL (ref 80–100)
MONOCYTES ABSOLUTE: 0.5 K/UL (ref 0–1.3)
MONOCYTES RELATIVE PERCENT: 8.6 %
NEUTROPHILS ABSOLUTE: 2.9 K/UL (ref 1.7–7.7)
NEUTROPHILS RELATIVE PERCENT: 46.4 %
PDW BLD-RTO: 14.9 % (ref 12.4–15.4)
PLATELET # BLD: 226 K/UL (ref 135–450)
PMV BLD AUTO: 8.1 FL (ref 5–10.5)
RBC # BLD: 3.66 M/UL (ref 4.2–5.9)
WBC # BLD: 6.3 K/UL (ref 4–11)

## 2022-11-22 PROCEDURE — 99285 EMERGENCY DEPT VISIT HI MDM: CPT

## 2022-11-22 PROCEDURE — 93005 ELECTROCARDIOGRAM TRACING: CPT | Performed by: EMERGENCY MEDICINE

## 2022-11-22 PROCEDURE — 6370000000 HC RX 637 (ALT 250 FOR IP): Performed by: PHYSICIAN ASSISTANT

## 2022-11-22 PROCEDURE — 80053 COMPREHEN METABOLIC PANEL: CPT

## 2022-11-22 PROCEDURE — 85025 COMPLETE CBC W/AUTO DIFF WBC: CPT

## 2022-11-22 PROCEDURE — 84484 ASSAY OF TROPONIN QUANT: CPT

## 2022-11-22 PROCEDURE — 71046 X-RAY EXAM CHEST 2 VIEWS: CPT

## 2022-11-22 RX ORDER — METHOCARBAMOL 750 MG/1
750 TABLET, FILM COATED ORAL 2 TIMES DAILY
Qty: 20 TABLET | Refills: 0 | Status: SHIPPED | OUTPATIENT
Start: 2022-11-22

## 2022-11-22 RX ORDER — ACETAMINOPHEN 500 MG
1000 TABLET ORAL ONCE
Status: COMPLETED | OUTPATIENT
Start: 2022-11-22 | End: 2022-11-22

## 2022-11-22 RX ORDER — LIDOCAINE 4 G/G
1 PATCH TOPICAL DAILY
Status: DISCONTINUED | OUTPATIENT
Start: 2022-11-23 | End: 2022-11-22

## 2022-11-22 RX ORDER — LIDOCAINE 4 G/G
1 PATCH TOPICAL ONCE
Status: DISCONTINUED | OUTPATIENT
Start: 2022-11-22 | End: 2022-11-23 | Stop reason: HOSPADM

## 2022-11-22 RX ORDER — IBUPROFEN 800 MG/1
800 TABLET ORAL EVERY 8 HOURS PRN
Qty: 30 TABLET | Refills: 0 | Status: SHIPPED | OUTPATIENT
Start: 2022-11-22 | End: 2022-11-23 | Stop reason: CLARIF

## 2022-11-22 RX ORDER — LIDOCAINE 50 MG/G
1 PATCH TOPICAL DAILY
Qty: 10 PATCH | Refills: 0 | Status: SHIPPED | OUTPATIENT
Start: 2022-11-22 | End: 2022-12-02

## 2022-11-22 RX ORDER — IBUPROFEN 400 MG/1
800 TABLET ORAL ONCE
Status: COMPLETED | OUTPATIENT
Start: 2022-11-22 | End: 2022-11-22

## 2022-11-22 RX ORDER — HYDROCODONE BITARTRATE AND ACETAMINOPHEN 5; 325 MG/1; MG/1
1 TABLET ORAL EVERY 6 HOURS PRN
Qty: 10 TABLET | Refills: 0 | Status: SHIPPED | OUTPATIENT
Start: 2022-11-22 | End: 2022-11-25

## 2022-11-22 RX ADMIN — IBUPROFEN 800 MG: 400 TABLET, FILM COATED ORAL at 23:29

## 2022-11-22 RX ADMIN — ACETAMINOPHEN 1000 MG: 500 TABLET ORAL at 23:29

## 2022-11-22 ASSESSMENT — PAIN DESCRIPTION - DESCRIPTORS: DESCRIPTORS: SHOOTING

## 2022-11-22 ASSESSMENT — ENCOUNTER SYMPTOMS
COUGH: 0
BACK PAIN: 0
EYE PAIN: 0
SHORTNESS OF BREATH: 0
NAUSEA: 0
ABDOMINAL PAIN: 0
VOMITING: 0
SORE THROAT: 0

## 2022-11-22 ASSESSMENT — PAIN SCALES - GENERAL
PAINLEVEL_OUTOF10: 10
PAINLEVEL_OUTOF10: 10

## 2022-11-22 ASSESSMENT — PAIN DESCRIPTION - ORIENTATION: ORIENTATION: LEFT

## 2022-11-22 ASSESSMENT — PAIN DESCRIPTION - LOCATION: LOCATION: CHEST

## 2022-11-23 LAB
A/G RATIO: 1 (ref 1.1–2.2)
ALBUMIN SERPL-MCNC: 3.8 G/DL (ref 3.4–5)
ALP BLD-CCNC: 106 U/L (ref 40–129)
ALT SERPL-CCNC: 10 U/L (ref 10–40)
ANION GAP SERPL CALCULATED.3IONS-SCNC: 12 MMOL/L (ref 3–16)
ANION GAP SERPL CALCULATED.3IONS-SCNC: 14 MMOL/L (ref 3–16)
AST SERPL-CCNC: 14 U/L (ref 15–37)
BILIRUB SERPL-MCNC: 0.3 MG/DL (ref 0–1)
BUN BLDV-MCNC: 27 MG/DL (ref 7–20)
BUN BLDV-MCNC: 31 MG/DL (ref 7–20)
CALCIUM SERPL-MCNC: 8.6 MG/DL (ref 8.3–10.6)
CALCIUM SERPL-MCNC: 9.3 MG/DL (ref 8.3–10.6)
CHLORIDE BLD-SCNC: 103 MMOL/L (ref 99–110)
CHLORIDE BLD-SCNC: 105 MMOL/L (ref 99–110)
CO2: 18 MMOL/L (ref 21–32)
CO2: 23 MMOL/L (ref 21–32)
CREAT SERPL-MCNC: 2.1 MG/DL (ref 0.9–1.3)
CREAT SERPL-MCNC: 2.2 MG/DL (ref 0.9–1.3)
EKG ATRIAL RATE: 71 BPM
EKG DIAGNOSIS: NORMAL
EKG P AXIS: 62 DEGREES
EKG P-R INTERVAL: 188 MS
EKG Q-T INTERVAL: 382 MS
EKG QRS DURATION: 90 MS
EKG QTC CALCULATION (BAZETT): 415 MS
EKG R AXIS: 12 DEGREES
EKG T AXIS: 56 DEGREES
EKG VENTRICULAR RATE: 71 BPM
GFR SERPL CREATININE-BSD FRML MDRD: 36 ML/MIN/{1.73_M2}
GFR SERPL CREATININE-BSD FRML MDRD: 38 ML/MIN/{1.73_M2}
GLUCOSE BLD-MCNC: 104 MG/DL (ref 70–99)
GLUCOSE BLD-MCNC: 108 MG/DL (ref 70–99)
POTASSIUM SERPL-SCNC: 4.1 MMOL/L (ref 3.5–5.1)
POTASSIUM SERPL-SCNC: 4.4 MMOL/L (ref 3.5–5.1)
SODIUM BLD-SCNC: 137 MMOL/L (ref 136–145)
SODIUM BLD-SCNC: 138 MMOL/L (ref 136–145)
TOTAL PROTEIN: 7.6 G/DL (ref 6.4–8.2)
TROPONIN: <0.01 NG/ML

## 2022-11-23 PROCEDURE — 93010 ELECTROCARDIOGRAM REPORT: CPT | Performed by: INTERNAL MEDICINE

## 2022-11-23 PROCEDURE — 80048 BASIC METABOLIC PNL TOTAL CA: CPT

## 2022-11-23 PROCEDURE — 2580000003 HC RX 258: Performed by: PHYSICIAN ASSISTANT

## 2022-11-23 RX ORDER — 0.9 % SODIUM CHLORIDE 0.9 %
1000 INTRAVENOUS SOLUTION INTRAVENOUS ONCE
Status: COMPLETED | OUTPATIENT
Start: 2022-11-23 | End: 2022-11-23

## 2022-11-23 RX ADMIN — SODIUM CHLORIDE 1000 ML: 9 INJECTION, SOLUTION INTRAVENOUS at 00:31

## 2022-11-23 NOTE — ED PROVIDER NOTES
**ADVANCED PRACTICE PROVIDER, I HAVE EVALUATED THIS PATIENT**        [address] [facility]      Pt Name: Niurka Salazar  HSY:[id number]  [place] 1975  Date of evaluation: 11/22/2022  Provider: Jerardo Ashford PA-C  Note Started: 11:16 PM EST 11/23/2022        Chief Complaint:    Chief Complaint   Patient presents with    Chest Pain     States he was watching tv when pain started suddenly. Pain is 10/10 shooting on the left side of his chest. Pain increased when he chest is touched. Denies any recently injury to the area. Daily smoker. Denies sob         Nursing Notes, Past Medical Hx, Past Surgical Hx, Social Hx, Allergies, and Family Hx were all reviewed and agreed with or any disagreements were addressed in the HPI.    HPI: (Location, Duration, Timing, Severity, Quality, Assoc Sx, Context, Modifying factors)    Chief Complaint of left-sided neck pain that radiates down his left arm and upper chest.  Denies injury. Stated with this watching TV he was leaning towards the side. When he stood up he felt the pain. Denies any weakness. No back pain. No nausea vomiting. No lightheaded or dizziness. No abdominal pain. No other complaints. This is a  55 y.o. male who presents to the emergency room with the above complaint. PastMedical/Surgical History:      Diagnosis Date    Anal lesion     Asthma     as a child    Condyloma     ED (erectile dysfunction)     Histoplasmosis     HIV disease ([facility] [address]     Hypertension     PONV (postoperative nausea and vomiting)     Vitamin D deficiency     Wears glasses          Procedure Laterality Date    ANKLE SURGERY Right     bone spurs removed    CARPAL TUNNEL RELEASE Right 11/30/2021    RIGHT CARPAL TUNNEL RELEASE performed by Kumar Aponte MD at [address] [facility] Right 01/23/2018    Right First dorsal extensor compartment tendon release.     WRIST SURGERY Left 10/15/2019    LEFT FIRST DORSAL COMPARTMENT (DEQUERVAIN'S) RELEASE [facility]        Medications:  Previous Medications    ABACAVIR-LAMIVUDINE (EPZICOM) 600-300 MG PER TABLET    Take 1 tablet by mouth every evening     CYCLOBENZAPRINE (FLEXERIL) 5 MG TABLET    Take 1 tablet by mouth 3 times daily as needed for Muscle spasms    HYDROCHLOROTHIAZIDE (HYDRODIURIL) 25 MG TABLET    Take 25 mg by mouth daily    LOSARTAN (COZAAR) 100 MG TABLET    Take 100 mg by mouth daily    METOPROLOL SUCCINATE (TOPROL XL) 100 MG EXTENDED RELEASE TABLET    Take 100 mg by mouth daily    NUTRITIONAL SUPPLEMENTS (VITAMIN D BOOSTER PO)    Take 1,000 Units by mouth daily    PREZCOBIX 800-150 MG TABS    Take 1 tablet by mouth every evening          Review of Systems:  (2-9 systems needed)  Review of Systems   Constitutional:  Negative for chills and fever. HENT:  Negative for congestion and sore throat. Eyes:  Negative for pain and visual disturbance. Respiratory:  Negative for cough and shortness of breath. Cardiovascular:  Negative for chest pain and leg swelling. Gastrointestinal:  Negative for abdominal pain, nausea and vomiting. Genitourinary:  Negative for dysuria and frequency. Musculoskeletal:  Positive for neck pain. Negative for back pain. Skin:  Negative for rash and wound. Neurological:  Negative for dizziness and light-headedness. \"Positives and Pertinent negatives as per HPI\"    Physical Exam:  Physical Exam  Vitals and nursing note reviewed. Neck:     Cardiovascular:      Rate and Rhythm: Normal rate and regular rhythm. Heart sounds: Normal heart sounds. No murmur heard. No friction rub. No gallop. Pulmonary:      Effort: Pulmonary effort is normal. No respiratory distress. Breath sounds: Normal breath sounds. No wheezing or rales. Chest:      Chest wall: Tenderness present. Musculoskeletal:      Cervical back: Pain with movement and muscular tenderness present.  No spinous process tenderness. Decreased range of motion. Thoracic back: Normal.      Lumbar back: Normal.   Skin:     General: Skin is warm. Neurological:      General: No focal deficit present. MEDICAL DECISION MAKING    Vitals:    Vitals:    11/22/22 2300   BP: (!) 185/95   Pulse: 70   Resp: 22   Temp: 97.8 °F (36.6 °C)   TempSrc: Temporal   SpO2: 99%   Weight: 226 lb 13.7 oz (102.9 kg)   Height: 6' 1\" (1.854 m)       LABS:  Labs Reviewed   CBC WITH AUTO DIFFERENTIAL - Abnormal; Notable for the following components:       Result Value    RBC 3.66 (*)     Hemoglobin 12.7 (*)     Hematocrit 38.4 (*)     .8 (*)     MCH 34.7 (*)     All other components within normal limits   COMPREHENSIVE METABOLIC PANEL - Abnormal; Notable for the following components:    Glucose 108 (*)     BUN 27 (*)     Creatinine 2.2 (*)     Est, Glom Filt Rate 36 (*)     Albumin/Globulin Ratio 1.0 (*)     AST 14 (*)     All other components within normal limits   BASIC METABOLIC PANEL - Abnormal; Notable for the following components:    CO2 18 (*)     Glucose 104 (*)     BUN 31 (*)     Creatinine 2.1 (*)     Est, Glom Filt Rate 38 (*)     All other components within normal limits   TROPONIN        Remainder of labs reviewed and were negative at this time or not returned at the time of this note. RADIOLOGY:   Non-plain film images such as CT, Ultrasound and MRI are read by the radiologist. Tatiana Raymond PA-C have directly visualized the radiologic plain film image(s) with the below findings:      Interpretation per the Radiologist below, if available at the time of this note:    XR CHEST (2 VW)   Final Result   No acute airspace disease identified. No results found.        MEDICAL DECISION MAKING / ED COURSE:      PROCEDURES:   Procedures    None    Patient was given:  Medications   lidocaine 4 % external patch 1 patch (1 patch TransDERmal Patch Applied 11/22/22 5744)   acetaminophen (TYLENOL) tablet 1,000 mg (1,000 mg Oral Given 11/22/22 2329)   ibuprofen (ADVIL;MOTRIN) tablet 800 mg (800 mg Oral Given 11/22/22 2329)   0.9 % sodium chloride bolus (0 mLs IntraVENous Stopped 11/23/22 0133)       Emergency room course: Patient on exam cardiovascular regular rate and rhythm, lungs are clear. No wheeze, rales or rhonchi noted. Patient has no midline tenderness cervical, thoracic or lumbar spine. He does have mild tenderness reproducible with palpation lateral side of left side neck extending to upper anterior shoulder and upper chest wall. He has pain when he tried to raise his arm above his head. Only able to abduct about 45 degrees before describes severe pain. He is unable to fully bring his arm above his head due to pain. He does have full range of motion of right upper extremity.  strength 5+ equal bilaterally. He has good strength against resistance to abduction of his fingers. Abdomen is soft nontender. Normal bowel sounds all 4 quadrant. Full range of motion all other extremity alert oriented x4. Does not appear to be in acute distress. Lab result from today shows:  CBC shows white count 6.3, RBC 3.66, hemoglobin 12.7, hematocrit 38.4. CMP shows sodium 138, potassium 4.4, chloride 103 BUN 27 creatinine 2.2. Troponin less than 0.01    Chest x-ray showed no acute cardiopulmonary process. I did discuss with patient his lab results. It does appear that his creatinine went from 1.2-2.2 and his BUN is elevated this could be related to some dehydration. Offered to give him a liter fluids and repeat the labs. He was okay with this plan. BMP shows sodium 137, potassium 4.1, chloride 105 with a BUN of 31 and creatinine 2.1. There has been no improvement. At this point I did discuss with him I referred him to nephrology. Give their office a call tomorrow to schedule follow-up appointment. He was okay with this plan. He was treated for cervical radiculopathy.   He was given Norco, lidocaine patch, Robaxin. He was informed not to take any anti-inflammatories such as Aleve, Motrin, ibuprofen or Excedrin. Only Tylenol as needed for pain. He verbally stated he understood. He will be discharged good condition. The patient tolerated their visit well. I evaluated the patient. The physician was available for consultation as needed. The patient and / or the family were informed of the results of any tests, a time was given to answer questions, a plan was proposed and they agreed with plan. I am the Primary Clinician of Record. CLINICAL IMPRESSION:  1. Cervical radiculopathy    2. Acute kidney injury [facility])        DISPOSITION Decision To Discharge 11/23/2022 02:30:19 AM      PATIENT REFERRED TO:  Bree Rutherford [address]  [phone]    Call   As needed    Marilin Meier MD  [address] [facility] [address] [facility]  [phone]    Call in 1 day      DISCHARGE MEDICATIONS:  New Prescriptions    HYDROCODONE-ACETAMINOPHEN (NORCO) 5-325 MG PER TABLET    Take 1 tablet by mouth every 6 hours as needed for Pain for up to 3 days. LIDOCAINE (LIDODERM) 5 %    Place 1 patch onto the skin daily for 10 days 12 hours on, 12 hours off.     METHOCARBAMOL (ROBAXIN-750) 750 MG TABLET    Take 1 tablet by mouth 2 times daily       DISCONTINUED MEDICATIONS:  Discontinued Medications    No medications on file              (Please note the MDM and HPI sections of this note were completed with a voice recognition program.  Efforts were made to edit the dictations but occasionally words are mis-transcribed.)    Electronically signed, Wandalee Ormond, PA-C,          Wandalee Ormond, PA-C  11/23/22 [facility]  11/23/22 6121

## 2022-11-23 NOTE — DISCHARGE INSTRUCTIONS
Take prescribed medication as prescribed only    Lidocaine patches only to be worn for 12 hours/day. 12 hours on and 12 hours off before replacing with another patch. This is as needed. Follow-up with orthopedics if worsens or no improvement. Otherwise, follow-up with your primary care physician.   Return emergency room as needed

## 2023-01-20 ENCOUNTER — HOSPITAL ENCOUNTER (EMERGENCY)
Age: 48
Discharge: HOME | End: 2023-01-21
Attending: EMERGENCY MEDICINE
Payer: OTHER GOVERNMENT

## 2023-01-20 DIAGNOSIS — B20 HIV INFECTION, UNSPECIFIED SYMPTOM STATUS (HCC): ICD-10-CM

## 2023-01-20 DIAGNOSIS — R31.29 MICROSCOPIC HEMATURIA: ICD-10-CM

## 2023-01-20 DIAGNOSIS — J98.8 RESPIRATORY INFECTION: Primary | ICD-10-CM

## 2023-01-20 DIAGNOSIS — N18.9 CHRONIC KIDNEY DISEASE, UNSPECIFIED CKD STAGE: ICD-10-CM

## 2023-01-20 PROCEDURE — 87635 SARS-COV-2 COVID-19 AMP PRB: CPT

## 2023-01-20 PROCEDURE — 83690 ASSAY OF LIPASE: CPT

## 2023-01-20 PROCEDURE — 87804 INFLUENZA ASSAY W/OPTIC: CPT

## 2023-01-20 PROCEDURE — 80053 COMPREHEN METABOLIC PANEL: CPT

## 2023-01-20 PROCEDURE — 99284 EMERGENCY DEPT VISIT MOD MDM: CPT

## 2023-01-20 RX ORDER — ACETAMINOPHEN 500 MG
1000 TABLET ORAL ONCE
Status: COMPLETED | OUTPATIENT
Start: 2023-01-21 | End: 2023-01-21

## 2023-01-20 RX ORDER — 0.9 % SODIUM CHLORIDE 0.9 %
1000 INTRAVENOUS SOLUTION INTRAVENOUS ONCE
Status: COMPLETED | OUTPATIENT
Start: 2023-01-21 | End: 2023-01-21

## 2023-01-20 RX ORDER — ONDANSETRON 2 MG/ML
4 INJECTION INTRAMUSCULAR; INTRAVENOUS ONCE
Status: COMPLETED | OUTPATIENT
Start: 2023-01-21 | End: 2023-01-21

## 2023-01-20 ASSESSMENT — LIFESTYLE VARIABLES
HOW MANY STANDARD DRINKS CONTAINING ALCOHOL DO YOU HAVE ON A TYPICAL DAY: 1 OR 2
HOW OFTEN DO YOU HAVE A DRINK CONTAINING ALCOHOL: MONTHLY OR LESS

## 2023-01-20 ASSESSMENT — PAIN DESCRIPTION - FREQUENCY: FREQUENCY: CONTINUOUS

## 2023-01-20 ASSESSMENT — PAIN DESCRIPTION - ORIENTATION: ORIENTATION: OTHER (COMMENT)

## 2023-01-20 ASSESSMENT — PAIN DESCRIPTION - PAIN TYPE: TYPE: ACUTE PAIN

## 2023-01-20 ASSESSMENT — PAIN DESCRIPTION - DESCRIPTORS: DESCRIPTORS: ACHING

## 2023-01-20 ASSESSMENT — PAIN SCALES - GENERAL: PAINLEVEL_OUTOF10: 9

## 2023-01-20 ASSESSMENT — PAIN DESCRIPTION - LOCATION: LOCATION: GENERALIZED

## 2023-01-20 ASSESSMENT — PAIN - FUNCTIONAL ASSESSMENT: PAIN_FUNCTIONAL_ASSESSMENT: ACTIVITIES ARE NOT PREVENTED

## 2023-01-21 ENCOUNTER — APPOINTMENT (OUTPATIENT)
Dept: GENERAL RADIOLOGY | Age: 48
End: 2023-01-21
Payer: OTHER GOVERNMENT

## 2023-01-21 VITALS
BODY MASS INDEX: 28.84 KG/M2 | DIASTOLIC BLOOD PRESSURE: 85 MMHG | HEIGHT: 73 IN | SYSTOLIC BLOOD PRESSURE: 171 MMHG | OXYGEN SATURATION: 93 % | HEART RATE: 84 BPM | RESPIRATION RATE: 18 BRPM | WEIGHT: 217.59 LBS | TEMPERATURE: 98.8 F

## 2023-01-21 LAB
A/G RATIO: 0.9 (ref 1.1–2.2)
ALBUMIN SERPL-MCNC: 4.3 G/DL (ref 3.4–5)
ALP BLD-CCNC: 120 U/L (ref 40–129)
ALT SERPL-CCNC: 10 U/L (ref 10–40)
ANION GAP SERPL CALCULATED.3IONS-SCNC: 14 MMOL/L (ref 3–16)
AST SERPL-CCNC: 14 U/L (ref 15–37)
BACTERIA: ABNORMAL /HPF
BASOPHILS ABSOLUTE: 0 K/UL (ref 0–0.2)
BASOPHILS RELATIVE PERCENT: 0.8 %
BILIRUB SERPL-MCNC: 0.6 MG/DL (ref 0–1)
BILIRUBIN URINE: NEGATIVE
BLOOD, URINE: ABNORMAL
BUN BLDV-MCNC: 29 MG/DL (ref 7–20)
CALCIUM SERPL-MCNC: 10.1 MG/DL (ref 8.3–10.6)
CHLORIDE BLD-SCNC: 104 MMOL/L (ref 99–110)
CLARITY: CLEAR
CO2: 21 MMOL/L (ref 21–32)
COLOR: YELLOW
CREAT SERPL-MCNC: 2 MG/DL (ref 0.9–1.3)
EOSINOPHILS ABSOLUTE: 0.1 K/UL (ref 0–0.6)
EOSINOPHILS RELATIVE PERCENT: 2.1 %
EPITHELIAL CELLS, UA: ABNORMAL /HPF (ref 0–5)
GFR SERPL CREATININE-BSD FRML MDRD: 41 ML/MIN/{1.73_M2}
GLUCOSE BLD-MCNC: 120 MG/DL (ref 70–99)
GLUCOSE URINE: NEGATIVE MG/DL
HCT VFR BLD CALC: 39.1 % (ref 40.5–52.5)
HEMOGLOBIN: 13.4 G/DL (ref 13.5–17.5)
HYALINE CASTS: ABNORMAL /LPF (ref 0–2)
KETONES, URINE: NEGATIVE MG/DL
LEUKOCYTE ESTERASE, URINE: NEGATIVE
LIPASE: 39 U/L (ref 13–60)
LYMPHOCYTES ABSOLUTE: 0.7 K/UL (ref 1–5.1)
LYMPHOCYTES RELATIVE PERCENT: 12.5 %
MCH RBC QN AUTO: 34.1 PG (ref 26–34)
MCHC RBC AUTO-ENTMCNC: 34.3 G/DL (ref 31–36)
MCV RBC AUTO: 99.4 FL (ref 80–100)
MICROSCOPIC EXAMINATION: YES
MONOCYTES ABSOLUTE: 0.5 K/UL (ref 0–1.3)
MONOCYTES RELATIVE PERCENT: 10 %
NEUTROPHILS ABSOLUTE: 4 K/UL (ref 1.7–7.7)
NEUTROPHILS RELATIVE PERCENT: 74.6 %
NITRITE, URINE: NEGATIVE
PDW BLD-RTO: 14.7 % (ref 12.4–15.4)
PH UA: 6 (ref 5–8)
PLATELET # BLD: 265 K/UL (ref 135–450)
PMV BLD AUTO: 7.2 FL (ref 5–10.5)
POTASSIUM SERPL-SCNC: 4.2 MMOL/L (ref 3.5–5.1)
PROTEIN UA: 100 MG/DL
RAPID INFLUENZA  B AGN: NEGATIVE
RAPID INFLUENZA A AGN: NEGATIVE
RBC # BLD: 3.93 M/UL (ref 4.2–5.9)
RBC UA: ABNORMAL /HPF (ref 0–4)
SARS-COV-2, NAAT: NOT DETECTED
SEDIMENTATION RATE, ERYTHROCYTE: 117 MM/HR (ref 0–15)
SODIUM BLD-SCNC: 139 MMOL/L (ref 136–145)
SPECIFIC GRAVITY UA: >=1.03 (ref 1–1.03)
TOTAL PROTEIN: 9.1 G/DL (ref 6.4–8.2)
URINE TYPE: ABNORMAL
UROBILINOGEN, URINE: 0.2 E.U./DL
WBC # BLD: 5.4 K/UL (ref 4–11)
WBC UA: ABNORMAL /HPF (ref 0–5)

## 2023-01-21 PROCEDURE — 96374 THER/PROPH/DIAG INJ IV PUSH: CPT

## 2023-01-21 PROCEDURE — 6360000002 HC RX W HCPCS: Performed by: EMERGENCY MEDICINE

## 2023-01-21 PROCEDURE — 85652 RBC SED RATE AUTOMATED: CPT

## 2023-01-21 PROCEDURE — 81001 URINALYSIS AUTO W/SCOPE: CPT

## 2023-01-21 PROCEDURE — 36415 COLL VENOUS BLD VENIPUNCTURE: CPT

## 2023-01-21 PROCEDURE — 85025 COMPLETE CBC W/AUTO DIFF WBC: CPT

## 2023-01-21 PROCEDURE — 71046 X-RAY EXAM CHEST 2 VIEWS: CPT

## 2023-01-21 PROCEDURE — 6370000000 HC RX 637 (ALT 250 FOR IP): Performed by: EMERGENCY MEDICINE

## 2023-01-21 PROCEDURE — 2580000003 HC RX 258: Performed by: EMERGENCY MEDICINE

## 2023-01-21 RX ORDER — DOXYCYCLINE HYCLATE 100 MG
100 TABLET ORAL ONCE
Status: COMPLETED | OUTPATIENT
Start: 2023-01-21 | End: 2023-01-21

## 2023-01-21 RX ORDER — DOXYCYCLINE HYCLATE 100 MG
100 TABLET ORAL 2 TIMES DAILY
Qty: 14 TABLET | Refills: 0 | Status: SHIPPED | OUTPATIENT
Start: 2023-01-21 | End: 2023-01-28

## 2023-01-21 RX ORDER — CEFDINIR 300 MG/1
300 CAPSULE ORAL ONCE
Status: COMPLETED | OUTPATIENT
Start: 2023-01-21 | End: 2023-01-21

## 2023-01-21 RX ORDER — CEFDINIR 300 MG/1
300 CAPSULE ORAL 2 TIMES DAILY
Qty: 14 CAPSULE | Refills: 0 | Status: SHIPPED | OUTPATIENT
Start: 2023-01-21 | End: 2023-01-28

## 2023-01-21 RX ADMIN — DOXYCYCLINE HYCLATE 100 MG: 100 TABLET, COATED ORAL at 02:26

## 2023-01-21 RX ADMIN — CEFDINIR 300 MG: 300 CAPSULE ORAL at 02:26

## 2023-01-21 RX ADMIN — SODIUM CHLORIDE 1000 ML: 9 INJECTION, SOLUTION INTRAVENOUS at 00:02

## 2023-01-21 RX ADMIN — ACETAMINOPHEN 1000 MG: 500 TABLET ORAL at 00:01

## 2023-01-21 RX ADMIN — ONDANSETRON 4 MG: 2 INJECTION INTRAMUSCULAR; INTRAVENOUS at 00:01

## 2023-01-21 ASSESSMENT — ENCOUNTER SYMPTOMS
NAUSEA: 1
COUGH: 1
VOMITING: 1
SORE THROAT: 0
SHORTNESS OF BREATH: 0
ABDOMINAL PAIN: 0
TROUBLE SWALLOWING: 0
RHINORRHEA: 1
VOICE CHANGE: 0

## 2023-01-21 ASSESSMENT — PAIN SCALES - GENERAL: PAINLEVEL_OUTOF10: 2

## 2023-01-21 ASSESSMENT — PAIN - FUNCTIONAL ASSESSMENT: PAIN_FUNCTIONAL_ASSESSMENT: NONE - DENIES PAIN

## 2023-01-21 NOTE — ED TRIAGE NOTES
Patient to the ER with complaints of flu like symptoms. He says 2 days ago he started with a runny nose, chills, and body aches. Patient took some OTC mucinex. He also reports emesis and diarrhea. Patient is hypertensive at time of triage but reports he has not yet taken his evening blood pressure pill.

## 2023-01-21 NOTE — ED PROVIDER NOTES
[address] [facility]    Name: Karen Patel : 1975 MRN: [id number] Date of Service: 2023    BP (!) 171/85   Pulse 84   Temp 98.8 °F (37.1 °C) (Oral)   Resp 18   Ht 6' 1\" (1.854 m)   Wt 217 lb 9.5 oz (98.7 kg)   SpO2 93%   BMI 28.71 kg/m²     CC: fevers    HPI: this patient is a 52 y.o. male presenting to the ED from home. Mr. Michoacano Burton tells me that for the past 2 days he has been experiencing alternating sensations of fevers and chills. During the same time he has had a lot of nasal congestion and drainage he had a frequent and intense but nonproductive cough. Throughout the day he has been increasingly nauseous and he has vomited a couple times. Just within the past few hours he has developed diffuse muscle aches. As his condition seem to be worsening with time he came to this department this evening to be evaluated. He has not appreciated other changes from his usual state of health and he denies other current complaints. He tried taking guaifenesin at home without substantial improvement in his symptoms. He has not been using any other treatments for this condition as an outpatient. Of note, he he has HIV but his last HIV viral load was below the lab's limit of detection and his last CD4 count was greater than 400.  _____________________________________________________________________    Past Medical History:   Diagnosis Date    Asthma     Degenerative spinal arthritis     Histoplasmosis     HIV (human immunodeficiency virus infection) ([facility] [address]     Hypertension     Overweight     Vitamin D deficiency        Past Surgical History:   Procedure Laterality Date    ANKLE SURGERY Right     CARPAL TUNNEL RELEASE Right 2021    RIGHT CARPAL TUNNEL RELEASE performed by Andre John MD at [address] [place] [address] Right 2018    Right First dorsal extensor compartment tendon release.     WRIST SURGERY Left 10/15/2019    LEFT FIRST DORSAL COMPARTMENT (DEQUERVAIN'S) RELEASE performed by Sulaiman Argueta MD at [address] History     Tobacco Use    Smoking status: Every Day     Packs/day: 0.50     Years: 20.00     Pack years: 10.00     Types: Cigarettes    Smokeless tobacco: Never   Vaping Use    Vaping Use: Never used   Substance Use Topics    Alcohol use: Not Currently     Alcohol/week: 0.0 - 1.0 standard drinks    Drug use: Yes     Types: Marijuana Kieranelyn November)     Comment: daily       Family History   Problem Relation Age of Onset    Diabetes Mother     Heart Attack Mother     Heart Disease Mother      _____________________________________________________________________    Review of Systems   Constitutional:  Positive for chills and fever. Negative for fatigue. HENT:  Positive for congestion and rhinorrhea. Negative for sore throat, trouble swallowing and voice change. Eyes:  Negative for visual disturbance. Respiratory:  Positive for cough. Negative for shortness of breath. Cardiovascular:  Negative for chest pain. Gastrointestinal:  Positive for nausea and vomiting. Negative for abdominal pain. Genitourinary:  Negative for decreased urine volume. Musculoskeletal:  Positive for myalgias. Negative for gait problem. Skin:  Negative for rash. Neurological:  Negative for weakness, numbness and headaches. Physical Exam  Constitutional:       General: He is awake. He is not in acute distress. Appearance: He is not ill-appearing, toxic-appearing or diaphoretic. HENT:      Head: Normocephalic and atraumatic. Nose: Rhinorrhea present. Rhinorrhea is clear. Mouth/Throat:      Mouth: Mucous membranes are moist.      Pharynx: Oropharynx is clear. Uvula midline. No pharyngeal swelling, oropharyngeal exudate or posterior oropharyngeal erythema. Eyes:      Conjunctiva/sclera: Conjunctivae normal.   Neck:      Vascular: No JVD.       Trachea: Phonation normal.      Meningeal: Brudzinski's sign and Kernig's sign absent. Cardiovascular:      Rate and Rhythm: Normal rate and regular rhythm. Pulses:           Radial pulses are 2+ on the right side and 2+ on the left side. Heart sounds: Normal heart sounds. No murmur heard. Pulmonary:      Effort: Pulmonary effort is normal. No respiratory distress. Breath sounds: Examination of the right-middle field reveals rhonchi. Examination of the right-lower field reveals rhonchi. Rhonchi present. Abdominal:      General: There is no distension. Palpations: Abdomen is soft. Tenderness: There is no abdominal tenderness. There is no guarding or rebound. Musculoskeletal:      Cervical back: Normal range of motion and neck supple. No edema, rigidity or crepitus. No pain with movement. Lymphadenopathy:      Cervical: No cervical adenopathy. Skin:     General: Skin is warm and dry. Capillary Refill: Capillary refill takes less than 2 seconds. Coloration: Skin is not cyanotic, mottled or pale. Neurological:      Mental Status: He is alert and oriented to person, place, and time. Cranial Nerves: Cranial nerves 2-12 are intact. No cranial nerve deficit. Gait: Gait is intact. Gait normal.   Psychiatric:         Speech: Speech normal. Speech is not delayed or slurred. Behavior: Behavior normal.         Cognition and Memory: Cognition is not impaired.  Memory is not impaired.     _____________________________________________________________________    RESULTS:    Results for orders placed or performed during the hospital encounter of 01/20/23   COVID-19, Rapid    Specimen: Nasopharyngeal Swab   Result Value Ref Range    SARS-CoV-2, NAAT Not Detected Not Detected   Rapid influenza A/B antigens    Specimen: Nasopharyngeal   Result Value Ref Range    Rapid Influenza A Ag Negative Negative    Rapid Influenza B Ag Negative Negative   CBC with Auto Differential   Result Value Ref Range    WBC 5.4 4.0 - 11.0 K/uL    RBC 3.93 (L) 4.20 - 5.90 M/uL    Hemoglobin 13.4 (L) 13.5 - 17.5 g/dL    Hematocrit 39.1 (L) 40.5 - 52.5 %    MCV 99.4 80.0 - 100.0 fL    MCH 34.1 (H) 26.0 - 34.0 pg    MCHC 34.3 31.0 - 36.0 g/dL    RDW 14.7 12.4 - 15.4 %    Platelets 992 181 - 215 K/uL    MPV 7.2 5.0 - 10.5 fL    Neutrophils % 74.6 %    Lymphocytes % 12.5 %    Monocytes % 10.0 %    Eosinophils % 2.1 %    Basophils % 0.8 %    Neutrophils Absolute 4.0 1.7 - 7.7 K/uL    Lymphocytes Absolute 0.7 (L) 1.0 - 5.1 K/uL    Monocytes Absolute 0.5 0.0 - 1.3 K/uL    Eosinophils Absolute 0.1 0.0 - 0.6 K/uL    Basophils Absolute 0.0 0.0 - 0.2 K/uL   Comprehensive Metabolic Panel   Result Value Ref Range    Sodium 139 136 - 145 mmol/L    Potassium 4.2 3.5 - 5.1 mmol/L    Chloride 104 99 - 110 mmol/L    CO2 21 21 - 32 mmol/L    Anion Gap 14 3 - 16    Glucose 120 (H) 70 - 99 mg/dL    BUN 29 (H) 7 - 20 mg/dL    Creatinine 2.0 (H) 0.9 - 1.3 mg/dL    Est, Glom Filt Rate 41 (A) >60    Calcium 10.1 8.3 - 10.6 mg/dL    Total Protein 9.1 (H) 6.4 - 8.2 g/dL    Albumin 4.3 3.4 - 5.0 g/dL    Albumin/Globulin Ratio 0.9 (L) 1.1 - 2.2    Total Bilirubin 0.6 0.0 - 1.0 mg/dL    Alkaline Phosphatase 120 40 - 129 U/L    ALT 10 10 - 40 U/L    AST 14 (L) 15 - 37 U/L   Urinalysis   Result Value Ref Range    Color, UA Yellow Straw/Yellow    Clarity, UA Clear Clear    Glucose, Ur Negative Negative mg/dL    Bilirubin Urine Negative Negative    Ketones, Urine Negative Negative mg/dL    Specific Gravity, UA >=1.030 1.005 - 1.030    Blood, Urine MODERATE (A) Negative    pH, UA 6.0 5.0 - 8.0    Protein,  (A) Negative mg/dL    Urobilinogen, Urine 0.2 <2.0 E.U./dL    Nitrite, Urine Negative Negative    Leukocyte Esterase, Urine Negative Negative    Microscopic Examination YES     Urine Type NotGiven    Sedimentation Rate   Result Value Ref Range    Sed Rate 117 (H) 0 - 15 mm/Hr   Lipase   Result Value Ref Range    Lipase 39.0 13.0 - 60.0 U/L   Microscopic Urinalysis   Result Value Ref Range    Hyaline Casts, UA 3-5 (A) 0 - 2 /LPF    WBC, UA 0-2 0 - 5 /HPF    RBC, UA 5-10 (A) 0 - 4 /HPF    Epithelial Cells, UA 2-5 0 - 5 /HPF    Bacteria, UA Rare (A) None Seen /HPF       XR CHEST (2 VW)   Preliminary Result   Bronchitis with bilateral peribronchial cuffing. There is no definable confluent pneumonia or pleural effusion. _____________________________________________________________________    Is this patient to be included in the SEP-1 Core Measure? No (no signs of severe sepsis or shock)  _____________________________________________________________________    MEDICAL DECISION MAKING & PLANS:    I reviewed the patient's recent and/or pertinent records, current medications, triage notes, allergies, and vital signs. Differential Diagnosis:  URI  Viral illness  Bacterial pneumonia    Labs & Studies:  Labs  CXR  Respiratory virus swabs    Treatments:  Medications   0.9 % sodium chloride bolus (0 mLs IntraVENous Stopped 1/21/23 0055)   ondansetron (ZOFRAN) injection 4 mg (4 mg IntraVENous Given 1/21/23 0001)   acetaminophen (TYLENOL) tablet 1,000 mg (1,000 mg Oral Given 1/21/23 0001)   cefdinir (OMNICEF) capsule 300 mg (300 mg Oral Given 1/21/23 0226)   doxycycline hyclate (VIBRA-TABS) tablet 100 mg (100 mg Oral Given 1/21/23 0226)       Discharge Medication List as of 1/21/2023  3:23 AM        START taking these medications    Details   cefdinir (OMNICEF) 300 MG capsule Take 1 capsule by mouth 2 times daily for 7 days, Disp-14 capsule, R-0Normal      doxycycline hyclate (VIBRA-TABS) 100 MG tablet Take 1 tablet by mouth 2 times daily for 7 days, Disp-14 tablet, R-0Normal           Disposition:  Mr. Farida Medina is well-appearing on reevaluation. He feels that his presenting symptoms have improved and are well controlled now. Urinalysis shows microscopic hematuria. His remaining lab results do not show other acute or emergent findings. CKD appears to be at baseline. Testing for COVID-19 and influenza is negative.   On my review of his chest x-ray -and when comparing it to a prior chest x-ray from 11/2022 - there are some findings suspicious for a RML pneumonia, which correlates to his area of abnormal lung sounds on exam.  Radiology staff interpreted the study as concerning for bronchitis. Discussed exam findings, results, and expected clinical course with him at length. Given that he is somewhat immunosuppressed recommend that we proceed with empiric antimicrobial therapy and he is in agreement with this plan. He is comfortable with discharge home now. Recommended repeat urinalysis at his next outpatient clinic appointment. Return precautions reviewed in detail and outpatient follow up advised. Clinical Impression(s)  1. Respiratory infection    2. HIV infection, unspecified symptom status ([address]    3. Chronic kidney disease, unspecified CKD stage    4. Microscopic hematuria        Medical Decision Making  Problems Addressed:  Chronic kidney disease, unspecified CKD stage: chronic illness or injury  HIV infection, unspecified symptom status ([address]): chronic illness or injury  Microscopic hematuria: undiagnosed new problem with uncertain prognosis  Respiratory infection: acute illness or injury    Amount and/or Complexity of Data Reviewed  External Data Reviewed: labs and notes. Details: Reviewed most recent infectious disease notes and labs from [facility]  Labs: ordered. Decision-making details documented in ED Course. Radiology: ordered and independent interpretation performed. Decision-making details documented in ED Course. Risk  OTC drugs. Prescription drug management. Provider Signature: MD Kt Martinez MD  01/21/23 6015

## 2023-04-09 ENCOUNTER — APPOINTMENT (OUTPATIENT)
Dept: GENERAL RADIOLOGY | Age: 48
End: 2023-04-09
Payer: OTHER GOVERNMENT

## 2023-04-09 ENCOUNTER — HOSPITAL ENCOUNTER (EMERGENCY)
Age: 48
Discharge: HOME | End: 2023-04-10
Attending: EMERGENCY MEDICINE
Payer: OTHER GOVERNMENT

## 2023-04-09 VITALS
DIASTOLIC BLOOD PRESSURE: 75 MMHG | BODY MASS INDEX: 30.47 KG/M2 | TEMPERATURE: 97 F | OXYGEN SATURATION: 98 % | SYSTOLIC BLOOD PRESSURE: 152 MMHG | HEIGHT: 73 IN | WEIGHT: 229.94 LBS | HEART RATE: 73 BPM | RESPIRATION RATE: 16 BRPM

## 2023-04-09 DIAGNOSIS — M25.561 ACUTE PAIN OF RIGHT KNEE: Primary | ICD-10-CM

## 2023-04-09 PROCEDURE — 99283 EMERGENCY DEPT VISIT LOW MDM: CPT

## 2023-04-09 PROCEDURE — 73560 X-RAY EXAM OF KNEE 1 OR 2: CPT

## 2023-04-09 RX ORDER — LIDOCAINE 4 G/G
1 PATCH TOPICAL DAILY
Qty: 30 PATCH | Refills: 0 | Status: SHIPPED | OUTPATIENT
Start: 2023-04-09 | End: 2023-05-09

## 2023-04-09 RX ORDER — ACETAMINOPHEN 500 MG
500 TABLET ORAL 4 TIMES DAILY PRN
Qty: 120 TABLET | Refills: 0 | Status: SHIPPED | OUTPATIENT
Start: 2023-04-09

## 2023-04-09 RX ORDER — ACETAMINOPHEN 325 MG/1
650 TABLET ORAL ONCE
Status: COMPLETED | OUTPATIENT
Start: 2023-04-10 | End: 2023-04-10

## 2023-04-09 RX ORDER — LIDOCAINE 4 G/G
1 PATCH TOPICAL ONCE
Status: DISCONTINUED | OUTPATIENT
Start: 2023-04-10 | End: 2023-04-10 | Stop reason: HOSPADM

## 2023-04-09 ASSESSMENT — PAIN DESCRIPTION - PAIN TYPE: TYPE: ACUTE PAIN

## 2023-04-09 ASSESSMENT — PAIN DESCRIPTION - DESCRIPTORS: DESCRIPTORS: DISCOMFORT

## 2023-04-09 ASSESSMENT — PAIN DESCRIPTION - LOCATION: LOCATION: KNEE

## 2023-04-09 ASSESSMENT — PAIN SCALES - GENERAL: PAINLEVEL_OUTOF10: 8

## 2023-04-09 ASSESSMENT — PAIN DESCRIPTION - FREQUENCY: FREQUENCY: CONTINUOUS

## 2023-04-09 ASSESSMENT — PAIN DESCRIPTION - ONSET: ONSET: GRADUAL

## 2023-04-09 ASSESSMENT — PAIN - FUNCTIONAL ASSESSMENT
PAIN_FUNCTIONAL_ASSESSMENT: 0-10
PAIN_FUNCTIONAL_ASSESSMENT: ACTIVITIES ARE NOT PREVENTED

## 2023-04-09 ASSESSMENT — PAIN DESCRIPTION - ORIENTATION: ORIENTATION: RIGHT

## 2023-04-10 PROCEDURE — 6370000000 HC RX 637 (ALT 250 FOR IP): Performed by: EMERGENCY MEDICINE

## 2023-04-10 RX ADMIN — ACETAMINOPHEN 650 MG: 325 TABLET ORAL at 00:01

## 2023-04-10 ASSESSMENT — PAIN DESCRIPTION - DESCRIPTORS
DESCRIPTORS: DISCOMFORT
DESCRIPTORS: DISCOMFORT

## 2023-04-10 ASSESSMENT — PAIN SCALES - GENERAL
PAINLEVEL_OUTOF10: 6
PAINLEVEL_OUTOF10: 6

## 2023-04-10 ASSESSMENT — PAIN DESCRIPTION - LOCATION
LOCATION: KNEE
LOCATION: KNEE

## 2023-04-10 ASSESSMENT — PAIN DESCRIPTION - ORIENTATION
ORIENTATION: RIGHT
ORIENTATION: RIGHT

## 2023-04-10 ASSESSMENT — PAIN DESCRIPTION - PAIN TYPE
TYPE: ACUTE PAIN
TYPE: ACUTE PAIN

## 2023-04-10 ASSESSMENT — PAIN - FUNCTIONAL ASSESSMENT
PAIN_FUNCTIONAL_ASSESSMENT: 0-10
PAIN_FUNCTIONAL_ASSESSMENT: ACTIVITIES ARE NOT PREVENTED

## 2023-04-11 PROBLEM — M25.561 RIGHT KNEE PAIN: Status: ACTIVE | Noted: 2023-04-11

## 2023-04-11 NOTE — ED PROVIDER NOTES
[address] [facility] ENCOUNTER        Pt Name: Any Ceron  MRN: [id number]  [place] 1975  Date of evaluation: 4/9/2023  Provider: Carol Ontiveros MD  PCP: Tony Quinn  Note Started: 5:20 AM EDT 4/11/23    CHIEF COMPLAINT       Chief Complaint   Patient presents with    Knee Pain     Right knee pain started yesterday when patient was getting out of the car. He thinks he may have twisted his knee. He denies other injury or h/o injury to the knee. HE took one dose of tylenol yesterday. HISTORY OF PRESENT ILLNESS: 1 or more Elements   History From: Patient        Any Ceron is a 52 y.o. male who presents pain to the right knee. Patient reports that while exiting a vehicle he twisted his knees causing pain into the anterior portion just distal to the patella. Denies a history of previous injury to the knee. He reports he is able to ambulate but does cause discomfort. He has not taken medications for symptoms. Denies ankle pain or hip pain. Nursing Notes were all reviewed and agreed with or any disagreements were addressed in the HPI. REVIEW OF SYSTEMS :      Review of Systems    Positives and Pertinent negatives as per HPI. SURGICAL HISTORY     Past Surgical History:   Procedure Laterality Date    ANKLE SURGERY Right     CARPAL TUNNEL RELEASE Right 11/30/2021    RIGHT CARPAL TUNNEL RELEASE performed by Glenis Billings MD at [address] [place] [address] Right 01/23/2018    Right First dorsal extensor compartment tendon release.     WRIST SURGERY Left 10/15/2019    LEFT FIRST DORSAL COMPARTMENT (DEQUERVAIN'S) RELEASE performed by Glenis Billings MD at [address] [place] [address] Medication List as of 4/10/2023  1:12 AM        CONTINUE these medications which have NOT CHANGED    Details   methocarbamol (ROBAXIN-750) 750 MG tablet Take 1 tablet by mouth 2 times daily, Disp-20

## 2023-04-20 ENCOUNTER — OFFICE VISIT (OUTPATIENT)
Dept: ORTHOPEDIC SURGERY | Age: 48
End: 2023-04-20

## 2023-04-20 VITALS — HEIGHT: 73 IN | WEIGHT: 229 LBS | RESPIRATION RATE: 18 BRPM | BODY MASS INDEX: 30.35 KG/M2

## 2023-04-20 DIAGNOSIS — S83.411A SPRAIN OF MEDIAL COLLATERAL LIGAMENT OF RIGHT KNEE, INITIAL ENCOUNTER: Primary | ICD-10-CM

## 2023-04-20 DIAGNOSIS — M22.41 CHONDROMALACIA PATELLAE OF RIGHT KNEE: ICD-10-CM

## 2023-04-20 NOTE — PROGRESS NOTES
recogn[facility]) and an attempt was made to check for errors. It is possible that there are still dictated errors within this office note. If so, please bring any significant errors to my attention for an addendum. All efforts were made to ensure that this office note is accurate.

## 2023-06-16 ENCOUNTER — HOSPITAL ENCOUNTER (EMERGENCY)
Age: 48
Discharge: HOME | End: 2023-06-16
Attending: EMERGENCY MEDICINE
Payer: OTHER GOVERNMENT

## 2023-06-16 VITALS
HEIGHT: 73 IN | TEMPERATURE: 98.5 F | BODY MASS INDEX: 29.29 KG/M2 | SYSTOLIC BLOOD PRESSURE: 133 MMHG | OXYGEN SATURATION: 100 % | DIASTOLIC BLOOD PRESSURE: 71 MMHG | WEIGHT: 221 LBS | RESPIRATION RATE: 16 BRPM | HEART RATE: 68 BPM

## 2023-06-16 DIAGNOSIS — M54.50 ACUTE MIDLINE LOW BACK PAIN WITHOUT SCIATICA: Primary | ICD-10-CM

## 2023-06-16 PROCEDURE — 6370000000 HC RX 637 (ALT 250 FOR IP): Performed by: EMERGENCY MEDICINE

## 2023-06-16 PROCEDURE — 99284 EMERGENCY DEPT VISIT MOD MDM: CPT

## 2023-06-16 PROCEDURE — 96372 THER/PROPH/DIAG INJ SC/IM: CPT

## 2023-06-16 PROCEDURE — 6360000002 HC RX W HCPCS: Performed by: EMERGENCY MEDICINE

## 2023-06-16 RX ORDER — ACETAMINOPHEN 500 MG
1000 TABLET ORAL EVERY 6 HOURS PRN
Qty: 30 TABLET | Refills: 0 | Status: SHIPPED | OUTPATIENT
Start: 2023-06-16

## 2023-06-16 RX ORDER — METHYLPREDNISOLONE 4 MG/1
TABLET ORAL
Qty: 1 KIT | Refills: 0 | Status: SHIPPED | OUTPATIENT
Start: 2023-06-17 | End: 2023-06-22

## 2023-06-16 RX ORDER — DEXAMETHASONE SODIUM PHOSPHATE 4 MG/ML
6 INJECTION, SOLUTION INTRA-ARTICULAR; INTRALESIONAL; INTRAMUSCULAR; INTRAVENOUS; SOFT TISSUE ONCE
Status: COMPLETED | OUTPATIENT
Start: 2023-06-16 | End: 2023-06-16

## 2023-06-16 RX ORDER — ACETAMINOPHEN 500 MG
1000 TABLET ORAL ONCE
Status: COMPLETED | OUTPATIENT
Start: 2023-06-16 | End: 2023-06-16

## 2023-06-16 RX ORDER — LIDOCAINE 4 G/G
1 PATCH TOPICAL DAILY
Status: DISCONTINUED | OUTPATIENT
Start: 2023-06-16 | End: 2023-06-16 | Stop reason: HOSPADM

## 2023-06-16 RX ORDER — LIDOCAINE 50 MG/G
1 PATCH TOPICAL DAILY
Qty: 10 PATCH | Refills: 0 | Status: SHIPPED | OUTPATIENT
Start: 2023-06-16 | End: 2023-06-26

## 2023-06-16 RX ORDER — ORPHENADRINE CITRATE 100 MG/1
100 TABLET, EXTENDED RELEASE ORAL 2 TIMES DAILY
Qty: 20 TABLET | Refills: 0 | Status: SHIPPED | OUTPATIENT
Start: 2023-06-16 | End: 2023-06-26

## 2023-06-16 RX ADMIN — ACETAMINOPHEN 1000 MG: 500 TABLET ORAL at 11:02

## 2023-06-16 RX ADMIN — DEXAMETHASONE SODIUM PHOSPHATE 6 MG: 4 INJECTION, SOLUTION INTRAMUSCULAR; INTRAVENOUS at 11:03

## 2023-06-16 ASSESSMENT — PAIN DESCRIPTION - DESCRIPTORS: DESCRIPTORS: SPASM

## 2023-06-16 ASSESSMENT — PAIN DESCRIPTION - PAIN TYPE: TYPE: ACUTE PAIN

## 2023-06-16 ASSESSMENT — PAIN - FUNCTIONAL ASSESSMENT
PAIN_FUNCTIONAL_ASSESSMENT: 0-10
PAIN_FUNCTIONAL_ASSESSMENT: 0-10

## 2023-06-16 ASSESSMENT — PAIN SCALES - GENERAL
PAINLEVEL_OUTOF10: 10

## 2023-06-16 ASSESSMENT — PAIN DESCRIPTION - ORIENTATION: ORIENTATION: LOWER

## 2023-06-16 ASSESSMENT — PAIN DESCRIPTION - LOCATION: LOCATION: BACK

## 2023-06-16 NOTE — ED PROVIDER NOTES
[address] [facility] ENCOUNTER        Pt Name: Arnel Pierre  MRN: [id number]  [place] 1975  Date of evaluation: 6/16/2023  Provider: Izaiah Bui MD  PCP: Qi Thomas  Note Started: 10:54 AM EDT 6/16/23    CHIEF COMPLAINT       Chief Complaint   Patient presents with    Back Pain     C/o low back pain since yesterday. Hx of herniated disc. NKI. HISTORY OF PRESENT ILLNESS: 1 or more Elements     History from : Patient    Limitations to history : None    Arnel Pierre is a 52 y.o. male who presents to the emergency department with lower back pain. Patient states this started yesterday. No known trauma or injury. He states he has been told he has a herniated disc before and he intermittently has pain in this area. He denies bowel or bladder incontinence, saddle anesthesia, numbness tingling or weakness down his legs. He drove himself here. He took Tylenol yesterday but nothing today. Nursing Notes were all reviewed and agreed with or any disagreements were addressed in the HPI. REVIEW OF SYSTEMS :      Review of Systems    10 systems reviewed and negative except as in HPI/MDM    SURGICAL HISTORY     Past Surgical History:   Procedure Laterality Date    ANKLE SURGERY Right     CARPAL TUNNEL RELEASE Right 11/30/2021    RIGHT CARPAL TUNNEL RELEASE performed by Jodell Boas, MD at [address] [place] [address] Right 01/23/2018    Right First dorsal extensor compartment tendon release.     WRIST SURGERY Left 10/15/2019    LEFT FIRST DORSAL COMPARTMENT (DEQUERVAIN'S) RELEASE performed by Jodell Boas, MD at [address] [place] [address]       Discharge Medication List as of 6/16/2023 11:08 AM        CONTINUE these medications which have NOT CHANGED    Details   cyclobenzaprine (FLEXERIL) 5 MG tablet Take 1 tablet by mouth 3 times daily as needed for Muscle spasms, Disp-20 tablet, R-0Normal

## 2023-08-06 ENCOUNTER — HOSPITAL ENCOUNTER (EMERGENCY)
Age: 48
Discharge: HOME | End: 2023-08-06
Attending: EMERGENCY MEDICINE
Payer: COMMERCIAL

## 2023-08-06 VITALS
TEMPERATURE: 99.1 F | RESPIRATION RATE: 18 BRPM | DIASTOLIC BLOOD PRESSURE: 86 MMHG | HEIGHT: 73 IN | BODY MASS INDEX: 30.15 KG/M2 | SYSTOLIC BLOOD PRESSURE: 164 MMHG | HEART RATE: 75 BPM | OXYGEN SATURATION: 96 % | WEIGHT: 227.51 LBS

## 2023-08-06 DIAGNOSIS — R36.9 PENILE DISCHARGE: Primary | ICD-10-CM

## 2023-08-06 LAB
BACTERIA URNS QL MICRO: ABNORMAL /HPF
BILIRUB UR QL STRIP.AUTO: NEGATIVE
CLARITY UR: ABNORMAL
COLOR UR: YELLOW
EPI CELLS #/AREA URNS HPF: ABNORMAL /HPF (ref 0–5)
GLUCOSE UR STRIP.AUTO-MCNC: NEGATIVE MG/DL
HGB UR QL STRIP.AUTO: ABNORMAL
KETONES UR STRIP.AUTO-MCNC: NEGATIVE MG/DL
LEUKOCYTE ESTERASE UR QL STRIP.AUTO: ABNORMAL
MUCOUS THREADS #/AREA URNS LPF: ABNORMAL /LPF
NITRITE UR QL STRIP.AUTO: NEGATIVE
PH UR STRIP.AUTO: 6 [PH] (ref 5–8)
PROT UR STRIP.AUTO-MCNC: ABNORMAL MG/DL
RBC #/AREA URNS HPF: ABNORMAL /HPF (ref 0–4)
SP GR UR STRIP.AUTO: 1.02 (ref 1–1.03)
TRICHOMONAS #/AREA URNS HPF: PRESENT /HPF
UA COMPLETE W REFLEX CULTURE PNL UR: YES
UA DIPSTICK W REFLEX MICRO PNL UR: YES
URN SPEC COLLECT METH UR: ABNORMAL
UROBILINOGEN UR STRIP-ACNC: 0.2 E.U./DL
WBC #/AREA URNS HPF: ABNORMAL /HPF (ref 0–5)

## 2023-08-06 PROCEDURE — 6360000002 HC RX W HCPCS: Performed by: EMERGENCY MEDICINE

## 2023-08-06 PROCEDURE — 87491 CHLMYD TRACH DNA AMP PROBE: CPT

## 2023-08-06 PROCEDURE — 99284 EMERGENCY DEPT VISIT MOD MDM: CPT

## 2023-08-06 PROCEDURE — 87591 N.GONORRHOEAE DNA AMP PROB: CPT

## 2023-08-06 PROCEDURE — 6370000000 HC RX 637 (ALT 250 FOR IP): Performed by: EMERGENCY MEDICINE

## 2023-08-06 PROCEDURE — 81001 URINALYSIS AUTO W/SCOPE: CPT

## 2023-08-06 PROCEDURE — 96372 THER/PROPH/DIAG INJ SC/IM: CPT

## 2023-08-06 PROCEDURE — 87086 URINE CULTURE/COLONY COUNT: CPT

## 2023-08-06 RX ORDER — DOXYCYCLINE HYCLATE 100 MG
100 TABLET ORAL ONCE
Status: COMPLETED | OUTPATIENT
Start: 2023-08-06 | End: 2023-08-06

## 2023-08-06 RX ORDER — DOXYCYCLINE HYCLATE 100 MG
100 TABLET ORAL 2 TIMES DAILY
Qty: 14 TABLET | Refills: 0 | Status: SHIPPED | OUTPATIENT
Start: 2023-08-06 | End: 2023-08-13

## 2023-08-06 RX ORDER — METRONIDAZOLE 500 MG/1
500 TABLET ORAL 2 TIMES DAILY
Qty: 14 TABLET | Refills: 0 | Status: SHIPPED | OUTPATIENT
Start: 2023-08-06 | End: 2023-08-13

## 2023-08-06 RX ORDER — CEFTRIAXONE 500 MG/1
500 INJECTION, POWDER, FOR SOLUTION INTRAMUSCULAR; INTRAVENOUS ONCE
Status: COMPLETED | OUTPATIENT
Start: 2023-08-06 | End: 2023-08-06

## 2023-08-06 RX ORDER — METRONIDAZOLE 500 MG/1
500 TABLET ORAL ONCE
Status: COMPLETED | OUTPATIENT
Start: 2023-08-06 | End: 2023-08-06

## 2023-08-06 RX ADMIN — CEFTRIAXONE SODIUM 500 MG: 500 INJECTION, POWDER, FOR SOLUTION INTRAMUSCULAR; INTRAVENOUS at 23:08

## 2023-08-06 RX ADMIN — DOXYCYCLINE HYCLATE 100 MG: 100 TABLET, COATED ORAL at 23:07

## 2023-08-06 RX ADMIN — METRONIDAZOLE 500 MG: 500 TABLET ORAL at 23:34

## 2023-08-06 ASSESSMENT — ENCOUNTER SYMPTOMS
NAUSEA: 0
BACK PAIN: 0
SHORTNESS OF BREATH: 0
EYE PAIN: 0
COUGH: 0
DIARRHEA: 0
CONSTIPATION: 0
ABDOMINAL PAIN: 0
EYE REDNESS: 0
RHINORRHEA: 0
VOMITING: 0

## 2023-08-07 NOTE — ED PROVIDER NOTES
No discharge. Left eye: No discharge. Extraocular Movements: Extraocular movements intact. Pupils: Pupils are equal, round, and reactive to light. Cardiovascular:      Rate and Rhythm: Normal rate and regular rhythm. Pulses: Normal pulses. Heart sounds: Normal heart sounds. No murmur heard. Pulmonary:      Effort: Pulmonary effort is normal. No respiratory distress. Breath sounds: Normal breath sounds. No wheezing. Abdominal:      General: Abdomen is flat. There is no distension. Palpations: Abdomen is soft. Tenderness: There is no abdominal tenderness. Musculoskeletal:         General: No swelling or tenderness. Normal range of motion. Cervical back: Normal range of motion. No rigidity or tenderness. Skin:     General: Skin is warm and dry. Capillary Refill: Capillary refill takes less than 2 seconds. Coloration: Skin is not jaundiced. Findings: No erythema. Neurological:      General: No focal deficit present. Mental Status: He is alert and oriented to person, place, and time. Cranial Nerves: No cranial nerve deficit. Psychiatric:         Mood and Affect: Mood normal.         Behavior: Behavior normal.       DIAGNOSTIC RESULTS   EKG: N/A    RADIOLOGY:   Non-plain film images such as CT, Ultrasound and MRI are read by the radiologist.   Plain radiographic images are visualized and preliminarily interpreted by the ED Provider with the below findings:  - n/a  Interpretation per Radiologist below, if available at the time of this note:  No orders to display     Last 7 days No results found. POCUS No results found.   ED BEDSIDE ULTRASOUND:   N/A    LABS:  Labs Reviewed   URINALYSIS WITH REFLEX TO CULTURE - Abnormal; Notable for the following components:       Result Value    Clarity, UA SL CLOUDY (*)     Blood, Urine SMALL (*)     Protein, UA TRACE (*)     Leukocyte Esterase, Urine MODERATE (*)     All other components within normal

## 2023-08-07 NOTE — ED TRIAGE NOTES
Patient presents to the ER with complaint of green discharge for the past 1 day. Mild burning with urination.

## 2023-08-08 LAB
BACTERIA UR CULT: NORMAL
C TRACH DNA UR QL NAA+PROBE: NEGATIVE
N GONORRHOEA DNA UR QL NAA+PROBE: NEGATIVE

## 2024-04-14 ENCOUNTER — HOSPITAL ENCOUNTER (EMERGENCY)
Age: 49
Discharge: HOME | End: 2024-04-14
Attending: EMERGENCY MEDICINE
Payer: COMMERCIAL

## 2024-04-14 VITALS
BODY MASS INDEX: 31.32 KG/M2 | WEIGHT: 236.33 LBS | HEART RATE: 65 BPM | DIASTOLIC BLOOD PRESSURE: 74 MMHG | HEIGHT: 73 IN | TEMPERATURE: 98.1 F | OXYGEN SATURATION: 97 % | RESPIRATION RATE: 18 BRPM | SYSTOLIC BLOOD PRESSURE: 124 MMHG

## 2024-04-14 DIAGNOSIS — M54.50 ACUTE BILATERAL LOW BACK PAIN WITHOUT SCIATICA: Primary | ICD-10-CM

## 2024-04-14 DIAGNOSIS — M54.50 ACUTE EXACERBATION OF CHRONIC LOW BACK PAIN: ICD-10-CM

## 2024-04-14 DIAGNOSIS — G89.29 ACUTE EXACERBATION OF CHRONIC LOW BACK PAIN: ICD-10-CM

## 2024-04-14 PROCEDURE — 6360000002 HC RX W HCPCS: Performed by: EMERGENCY MEDICINE

## 2024-04-14 PROCEDURE — 96372 THER/PROPH/DIAG INJ SC/IM: CPT

## 2024-04-14 PROCEDURE — 99284 EMERGENCY DEPT VISIT MOD MDM: CPT

## 2024-04-14 PROCEDURE — 6370000000 HC RX 637 (ALT 250 FOR IP): Performed by: EMERGENCY MEDICINE

## 2024-04-14 RX ORDER — METHYLPREDNISOLONE 4 MG/1
TABLET ORAL
Qty: 1 KIT | Refills: 0 | Status: SHIPPED | OUTPATIENT
Start: 2024-04-14

## 2024-04-14 RX ORDER — LIDOCAINE 50 MG/G
1 PATCH TOPICAL DAILY
Qty: 10 PATCH | Refills: 0 | Status: SHIPPED | OUTPATIENT
Start: 2024-04-14 | End: 2024-04-24

## 2024-04-14 RX ORDER — DEXAMETHASONE SODIUM PHOSPHATE 4 MG/ML
8 INJECTION, SOLUTION INTRA-ARTICULAR; INTRALESIONAL; INTRAMUSCULAR; INTRAVENOUS; SOFT TISSUE ONCE
Status: COMPLETED | OUTPATIENT
Start: 2024-04-14 | End: 2024-04-14

## 2024-04-14 RX ORDER — CYCLOBENZAPRINE HCL 10 MG
10 TABLET ORAL 3 TIMES DAILY PRN
Qty: 15 TABLET | Refills: 0 | Status: SHIPPED | OUTPATIENT
Start: 2024-04-14 | End: 2024-04-19

## 2024-04-14 RX ORDER — ACETAMINOPHEN 500 MG
1000 TABLET ORAL ONCE
Status: COMPLETED | OUTPATIENT
Start: 2024-04-14 | End: 2024-04-14

## 2024-04-14 RX ORDER — LIDOCAINE 4 G/G
1 PATCH TOPICAL ONCE
Status: DISCONTINUED | OUTPATIENT
Start: 2024-04-14 | End: 2024-04-15 | Stop reason: HOSPADM

## 2024-04-14 RX ORDER — ACETAMINOPHEN 500 MG
1000 TABLET ORAL
Qty: 30 TABLET | Refills: 0 | Status: SHIPPED | OUTPATIENT
Start: 2024-04-14

## 2024-04-14 RX ADMIN — DEXAMETHASONE SODIUM PHOSPHATE 8 MG: 4 INJECTION, SOLUTION INTRAMUSCULAR; INTRAVENOUS at 22:47

## 2024-04-14 RX ADMIN — ACETAMINOPHEN 1000 MG: 500 TABLET ORAL at 22:45

## 2024-04-14 ASSESSMENT — PAIN SCALES - GENERAL
PAINLEVEL_OUTOF10: 10
PAINLEVEL_OUTOF10: 10

## 2024-04-14 ASSESSMENT — PAIN - FUNCTIONAL ASSESSMENT: PAIN_FUNCTIONAL_ASSESSMENT: 0-10

## 2024-04-15 NOTE — ED NOTES
D/C: Order noted for d/c. Pt confirmed d/c paperwork  have correct name. Discharge and education instructions reviewed with patient. Teach-back successful.  Pt verbalized understanding. Pt denied questions at this time. No acute distress noted. Patient instructed to follow-up as noted - return to emergency department if symptoms worsen. Patient verbalized understanding. Discharged per EDMD with discharge instructions. Pt discharged to private vehicle. Patient stable upon departure. Thanked patient for choosing [facility] for care.

## 2024-04-15 NOTE — DISCHARGE INSTRUCTIONS
Call today or tomorrow to follow up with Itzel Gardiner  in 4-5 days.    Use an ice pack or bag filled with ice and apply to the injured area 3 - 4 times a day for 15 - 20 minutes each time.  If the injury is older than 3 days, then use a heating pad to help relax the muscles in your back.    Use ibuprofen or Tylenol (unless prescribed medications that have Tylenol in it) for pain.  You can take over the counter Ibuprofen (advil) tablets (4 tablets every 8 hours or 3 tablets every 6 hours or 2 tablets every 4 hours)    Roge' Flexion Exercises     1. Pelvic tilt. Lie on your back with knees bent, feet flat on floor. Flatten the small of your back against the floor, without pushing down with the legs. Hold for 5 to 10 seconds.     2. Single Knee to chest. Lie on your back with knees bent and feet flat on the floor. Slowly pull your right knee toward your shoulder and hold 5 to 10 seconds. Lower the knee and repeat with the other knee.     3. Double knee to chest. Begin as in the previous exercise. After pulling right knee to chest, pull left knee to chest and hold both knees for 5 to 10 seconds. Slowly lower one leg at a time.     4. Partial sit-up. Do the pelvic tilt (exercise 1) and, while holding this position, slowly curl your head and shoulders off the floor. Hold briefly. Return slowly to the starting position.     5. Hamstring stretch. Start in long sitting with toes directed toward the ceiling and knees fully extended. Slowly lower the trunk forward over the legs, keeping knees extended, arms outstretched over the legs, and eyes focus ahead.     6. Hip Flexor stretch. Place one foot in front of the other with the left (front) knee flexed and the right (back) knee held rigidly straight. Flex forward through the trunk until the left knee contacts the axillary fold (arm pit region). Repeat with right leg forward and left leg back.     7. Squat. Stand with both feet parallel, about shoulder's width apart.

## 2024-04-15 NOTE — ED PROVIDER NOTES
EMERGENCY DEPARTMENT ENCOUNTER     [facility] EMERGENCY DEPARTMENT     Pt Name: Berry Mendez Sr   MRN: [id number]   Birthdate 1975   Date of evaluation: 4/14/2024   Provider: Benigno English MD   PCP: Itzel Gardiner   Note Started: 10:16 PM EDT 4/14/24     CHIEF COMPLAINT     Chief Complaint   Patient presents with    Back Pain     Pt states that he has a herniated disc in his lower back that started acting up today-pain started today.         HISTORY OF PRESENT ILLNESS:  History from : Patient   Limitations to history : None     Berry Mendez Sr is a 48 y.o. male who presents with complaints of low back pain is bilateral it has been present over the past day and a half.  Patient states that he thinks he may have strained his lower back.  States he has a history of herniated disc years ago.  Patient states that he will occasionally have flareups of his low back pain.  States that he does do some heavy lifting at work occasionally.  Denies any falls or trauma.  Denies any manipulation of the spine.  No spinal surgery.  Denies any fevers or chills.  No bowel or bladder incontinence.  No numbness or weakness in the lower extremities.  States he is able to ambulate without difficulty.  Drove himself to the emergency room.    Nursing Notes were all reviewed and agreed with or any disagreements were addressed in the HPI.     ROS: Positives and Pertinent negatives as per HPI.    PAST MEDICAL HISTORY     Past medical history:  has a past medical history of Asthma, Degenerative spinal arthritis, Histoplasmosis, HIV (human immunodeficiency virus infection) (HCC), Hypertension, Overweight, and Vitamin D deficiency.    Past surgical history:  has a past surgical history that includes Ankle surgery (Right); other surgical history (Right, 01/23/2018); Wrist surgery (Left, 10/15/2019); Carpal tunnel release (Right, 11/30/2021); and Condyloma Excision.      PHYSICAL EXAM:  ED Triage Vitals

## 2024-06-07 ENCOUNTER — HOSPITAL ENCOUNTER (EMERGENCY)
Age: 49
Discharge: HOME | End: 2024-06-07
Attending: EMERGENCY MEDICINE
Payer: OTHER GOVERNMENT

## 2024-06-07 ENCOUNTER — APPOINTMENT (OUTPATIENT)
Dept: GENERAL RADIOLOGY | Age: 49
End: 2024-06-07
Attending: EMERGENCY MEDICINE
Payer: OTHER GOVERNMENT

## 2024-06-07 VITALS
OXYGEN SATURATION: 97 % | HEART RATE: 62 BPM | TEMPERATURE: 97.7 F | DIASTOLIC BLOOD PRESSURE: 74 MMHG | RESPIRATION RATE: 16 BRPM | HEIGHT: 73 IN | SYSTOLIC BLOOD PRESSURE: 171 MMHG | WEIGHT: 231.04 LBS | BODY MASS INDEX: 30.62 KG/M2

## 2024-06-07 DIAGNOSIS — M25.531 RIGHT WRIST PAIN: Primary | ICD-10-CM

## 2024-06-07 PROCEDURE — 6370000000 HC RX 637 (ALT 250 FOR IP): Performed by: EMERGENCY MEDICINE

## 2024-06-07 PROCEDURE — 73110 X-RAY EXAM OF WRIST: CPT

## 2024-06-07 PROCEDURE — 99283 EMERGENCY DEPT VISIT LOW MDM: CPT

## 2024-06-07 RX ORDER — ACETAMINOPHEN 325 MG/1
650 TABLET ORAL ONCE
Status: COMPLETED | OUTPATIENT
Start: 2024-06-07 | End: 2024-06-07

## 2024-06-07 RX ADMIN — ACETAMINOPHEN 325MG 650 MG: 325 TABLET ORAL at 08:56

## 2024-06-07 ASSESSMENT — PAIN DESCRIPTION - ORIENTATION: ORIENTATION: RIGHT

## 2024-06-07 ASSESSMENT — PAIN DESCRIPTION - LOCATION: LOCATION: WRIST

## 2024-06-07 ASSESSMENT — PAIN - FUNCTIONAL ASSESSMENT: PAIN_FUNCTIONAL_ASSESSMENT: ACTIVITIES ARE NOT PREVENTED

## 2024-06-07 ASSESSMENT — PAIN DESCRIPTION - PAIN TYPE: TYPE: ACUTE PAIN

## 2024-06-07 ASSESSMENT — PAIN SCALES - GENERAL
PAINLEVEL_OUTOF10: 6
PAINLEVEL_OUTOF10: 5
PAINLEVEL_OUTOF10: 7

## 2024-06-07 ASSESSMENT — ENCOUNTER SYMPTOMS
TROUBLE SWALLOWING: 0
SHORTNESS OF BREATH: 0
WHEEZING: 0
VOICE CHANGE: 0

## 2024-06-07 ASSESSMENT — PAIN DESCRIPTION - FREQUENCY: FREQUENCY: CONTINUOUS

## 2024-06-07 ASSESSMENT — PAIN DESCRIPTION - ONSET: ONSET: SUDDEN

## 2024-06-07 ASSESSMENT — PAIN DESCRIPTION - DESCRIPTORS: DESCRIPTORS: DISCOMFORT

## 2024-06-07 NOTE — ED PROVIDER NOTES
[facility]  eMERGENCY dEPARTMENT eNCOUnter      Pt Name: Berry Mendez Sr  MRN: [id number]  Birthdate 1975  Date of evaluation: 6/7/2024  Provider: Wenceslao Chisholm MD    CHIEF COMPLAINT       Chief Complaint   Patient presents with    Wrist Pain     HISTORY OF PRESENT ILLNESS   (Location/Symptom, Timing/Onset, Context/Setting, Quality, Duration, Modifying Factors, Severity)  Note limiting factors.     History obtained from: the patient     Berry Mendez Sr is a 48 y.o. male with history of HIV and arthritis who presents with right wrist pain for the past day.  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  Patient reports that he is active and uses his hands and wrists during practice but denies any known particular injury mechanism or specific event which led to the pain.  Patient denies any fever.  Patient denies any falls.  Patient denies any numbness or weakness.  Reports that fully extending his wrist worsens the pain.      HPI    Nursing Notes were reviewed.    REVIEW OFSYSTEMS    (2-9 systems for level 4, 10 or more for level 5)     Review of Systems   Constitutional:  Negative for fever.   HENT:  Negative for drooling, trouble swallowing and voice change.    Eyes:  Negative for visual disturbance.   Respiratory:  Negative for shortness of breath and wheezing.    Cardiovascular:  Negative for chest pain and palpitations.   Musculoskeletal:  Positive for arthralgias.   Neurological:  Negative for seizures and syncope.   Psychiatric/Behavioral:  Negative for self-injury and suicidal ideas.        Except as noted above the remainder of the review of systems was reviewed and negative.       PAST MEDICAL HISTORY     Past Medical History:   Diagnosis Date    Asthma     Degenerative spinal arthritis     Histoplasmosis     HIV (human immunodeficiency virus infection) (HCC)     Hypertension

## 2024-06-07 NOTE — ED TRIAGE NOTES
Patient to the ER with complaints of right wrist pain that started around 2030 last night.  Patient denies any known injury but says he is a   and it is possible he could have injured it during practice and not noticed.     He says he is barely able to put any pressure on it.     Denies taking any OTC pain medications PTA.

## 2024-08-29 ENCOUNTER — APPOINTMENT (OUTPATIENT)
Dept: GENERAL RADIOLOGY | Age: 49
End: 2024-08-29
Payer: OTHER GOVERNMENT

## 2024-08-29 ENCOUNTER — HOSPITAL ENCOUNTER (EMERGENCY)
Age: 49
Discharge: HOME | End: 2024-08-29
Attending: STUDENT IN AN ORGANIZED HEALTH CARE EDUCATION/TRAINING PROGRAM
Payer: OTHER GOVERNMENT

## 2024-08-29 VITALS
SYSTOLIC BLOOD PRESSURE: 162 MMHG | DIASTOLIC BLOOD PRESSURE: 85 MMHG | RESPIRATION RATE: 16 BRPM | HEART RATE: 67 BPM | HEIGHT: 73 IN | WEIGHT: 236.11 LBS | OXYGEN SATURATION: 98 % | BODY MASS INDEX: 31.29 KG/M2 | TEMPERATURE: 98.7 F

## 2024-08-29 DIAGNOSIS — S46.012A STRAIN OF LEFT ROTATOR CUFF CAPSULE, INITIAL ENCOUNTER: Primary | ICD-10-CM

## 2024-08-29 PROCEDURE — 99284 EMERGENCY DEPT VISIT MOD MDM: CPT

## 2024-08-29 PROCEDURE — 6360000002 HC RX W HCPCS: Performed by: STUDENT IN AN ORGANIZED HEALTH CARE EDUCATION/TRAINING PROGRAM

## 2024-08-29 PROCEDURE — 96372 THER/PROPH/DIAG INJ SC/IM: CPT

## 2024-08-29 PROCEDURE — 73030 X-RAY EXAM OF SHOULDER: CPT

## 2024-08-29 RX ORDER — KETOROLAC TROMETHAMINE 30 MG/ML
30 INJECTION, SOLUTION INTRAMUSCULAR; INTRAVENOUS ONCE
Status: COMPLETED | OUTPATIENT
Start: 2024-08-29 | End: 2024-08-29

## 2024-08-29 RX ADMIN — KETOROLAC TROMETHAMINE 30 MG: 30 INJECTION, SOLUTION INTRAMUSCULAR at 12:14

## 2024-08-29 ASSESSMENT — PAIN DESCRIPTION - DESCRIPTORS: DESCRIPTORS: SHARP;SHOOTING

## 2024-08-29 ASSESSMENT — PAIN SCALES - GENERAL
PAINLEVEL_OUTOF10: 10

## 2024-08-29 ASSESSMENT — PAIN DESCRIPTION - LOCATION: LOCATION: SHOULDER

## 2024-08-29 ASSESSMENT — PAIN DESCRIPTION - PAIN TYPE: TYPE: ACUTE PAIN

## 2024-08-29 ASSESSMENT — PAIN - FUNCTIONAL ASSESSMENT
PAIN_FUNCTIONAL_ASSESSMENT: 0-10
PAIN_FUNCTIONAL_ASSESSMENT: 0-10

## 2024-08-29 ASSESSMENT — PAIN DESCRIPTION - ORIENTATION: ORIENTATION: LEFT

## 2024-08-29 NOTE — ED PROVIDER NOTES
[facility]      EMERGENCY MEDICINE     Pt Name: Berry Mendez Sr  MRN: [id number]  Birthdate 1975  Date of evaluation: 8/29/2024  Provider: Ady Gleason MD    CHIEF COMPLAINT       Chief Complaint   Patient presents with    Shoulder Pain     C/o left shoulder pain x 3 days. NKI. States pain from neck to fingertips.      HISTORY OF PRESENT ILLNESS   Berry Mendez Sr is a 48 y.o. male who presents to the emergency department for left shoulder pain.  No recent traumatic injuries or falls that he can recall.  The pain radiates down his arm at times.  Mainly outside of his arm.  No fevers no chills.  Does do some frequently heavy lifting and moving of items. He is right handed.         PASTMEDICAL HISTORY     Past Medical History:   Diagnosis Date    Asthma     Degenerative spinal arthritis     Histoplasmosis     HIV (human immunodeficiency virus infection) (HCC)     Hypertension     Overweight     Vitamin D deficiency        Patient Active Problem List   Diagnosis Code    Radial styloid tenosynovitis of right hand M65.4    Sprain of finger of left hand S63.619A    Bimalleolar avulsion fracture S82.843A    Radial styloid tenosynovitis (de quervain) M65.4    Carpal tunnel syndrome, right G56.01    Carpal tunnel syndrome G56.00    Right knee pain M25.561    Chondromalacia patellae of right knee M22.41    Sprain of medial collateral ligament of right knee S83.411A     SURGICAL HISTORY       Past Surgical History:   Procedure Laterality Date    ANKLE SURGERY Right     CARPAL TUNNEL RELEASE Right 11/30/2021    RIGHT CARPAL TUNNEL RELEASE performed by David Elliott MD at [facility] OR    CONDYLOMA EXCISION      OTHER SURGICAL HISTORY Right 01/23/2018    Right First dorsal extensor compartment tendon release.    WRIST SURGERY Left 10/15/2019    LEFT FIRST DORSAL COMPARTMENT (DEQUERVAIN'S) RELEASE performed by David Elliott MD at [facility] OR       CURRENT MEDICATIONS    normal.  The joint spaces are unremarkable.  No  fracture, dislocation or focal bone lesion is identified.     IMPRESSION:  No acute findings.  \" [AL]      ED Course User Index  [AL] Ady Gleason MD       Is this patient to be included in the SEP-1 core measure? No Exclusion criteria - the patient is NOT to be included for SEP-1 Core Measure due to: 2+ SIRS criteria are not met     MDM Summary:  History was obtained from: Patient and chart review      This a well-appearing 48 male comes with left shoulder pain of the last 2 days with no recent traumatic injuries or falls he is right-handed in nature he has pain with abduction tenderness in the AC groove as well as supraspinatus infraspinatus and a positive decant test    Differentials Considered:     Fracture dislocation sprain strain contusion rotator cuff injury labrum    Work up performed:   X-ray no acute fracture dislocation    Patient was given pain medications for symptomatic relief and given discharge instructions to follow-up with orthopedic surgeon for outpatient management and further workup.     Consults (none if blank):        Shared Decision-Making was performed and disposition discussed with the patient and their questions were answered     Social determinants of health impacting treatment or disposition:  None    Code Status:    Not addressed at this visit      Vitals Reviewed:    Vitals:    08/29/24 1147   BP: (!) 162/85   Pulse: 67   Resp: 16   Temp: 98.7 °F (37.1 °C)   TempSrc: Oral   SpO2: 98%   Weight: 107.1 kg (236 lb 1.8 oz)   Height: 1.854 m (6' 1\")       The patient was seen and examined.The results of pertinent diagnostic studies and exam findings were discussed. The patient’s provisional diagnosis and plan of care were discussed with the patient  who expressed a complete understanding. Any medications were reviewed and indications and risks of medications were discussed with the patient.     Strict verbal and written return

## 2024-08-30 ENCOUNTER — OFFICE VISIT (OUTPATIENT)
Dept: ORTHOPEDIC SURGERY | Age: 49
End: 2024-08-30
Payer: OTHER GOVERNMENT

## 2024-08-30 VITALS — WEIGHT: 239 LBS | HEIGHT: 73 IN | BODY MASS INDEX: 31.68 KG/M2

## 2024-08-30 DIAGNOSIS — M25.512 ACUTE PAIN OF LEFT SHOULDER: Primary | ICD-10-CM

## 2024-08-30 DIAGNOSIS — M19.012 ARTHRITIS OF LEFT STERNOCLAVICULAR JOINT: ICD-10-CM

## 2024-08-30 PROCEDURE — G8427 DOCREV CUR MEDS BY ELIG CLIN: HCPCS | Performed by: PHYSICIAN ASSISTANT

## 2024-08-30 PROCEDURE — 4004F PT TOBACCO SCREEN RCVD TLK: CPT | Performed by: PHYSICIAN ASSISTANT

## 2024-08-30 PROCEDURE — G8417 CALC BMI ABV UP PARAM F/U: HCPCS | Performed by: PHYSICIAN ASSISTANT

## 2024-08-30 PROCEDURE — 99214 OFFICE O/P EST MOD 30 MIN: CPT | Performed by: PHYSICIAN ASSISTANT

## 2024-08-30 RX ORDER — METHYLPREDNISOLONE 4 MG
TABLET, DOSE PACK ORAL
Qty: 1 KIT | Refills: 0 | Status: SHIPPED | OUTPATIENT
Start: 2024-08-30 | End: 2024-09-05

## 2024-08-30 NOTE — PROGRESS NOTES
ORTHOPAEDIC NEW PATIENT NOTE    Chief Complaint   Patient presents with    New Patient     NP Lt Shoulder       HPI  8/30/24  48 y.o. male RHD seen for evaluation of left shoulder pain:  Onset 2 days  Injury/trauma None  History of symptoms no previous issues with the left shoulder  Pain is located mostly over the left SC joint, but radiates into the superior shoulder and around the neck  Described as mostly throbbing  Worse with laying flat on his back or letting his arm hanging at his side  Better with  Toradol injection given in ED, but only lasted for a few hours  Has also tried Tylenol, ice, and heat, with minimal relief  Neck pain = yes  Radicular symptoms = no  Numbness and/or tingling = no  Patient smokes half pack of cigarettes per day  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, no heavy lifting      Review of Systems  Constitutional - denies fevers, weight loss  Cardiovascular - denies chest pain, palpitations, peripheral edema, blood clots  Respiratory - denies SOB, cough  Gastrointestinal - denies abdominal pain, nausea, vomiting  Genitourinary - denies dysuria, discharge  Musculoskeletal - per HPI  Integumentary - denies rash, sores  Neurologic - denies numbness, tingling, paresthesias  Hematologic - denies abnormal bleeding, blood clots  Allergic/Immunologic - denies metal allergies, recurrent infections    Allergies   Allergen Reactions    Amoxicillin Hives    Penicillins Hives    Ibuprofen Other (See Comments)     Can not have d/t kidney problems        Current Outpatient Medications   Medication Sig Dispense Refill    methylPREDNISolone (MEDROL, FANI,) 4 MG tablet Pain, radicular 1 kit 0    losartan (COZAAR) 100 MG tablet Take 1 tablet by mouth daily      hydroCHLOROthiazide (HYDRODIURIL) 25 MG tablet Take 1 tablet by mouth daily      metoprolol succinate (TOPROL XL) 100 MG extended release tablet Take 1 tablet by mouth daily      Nutritional Supplements (VITAMIN D BOOSTER PO) Take 1,000  joint.      Labs:  No results for input(s): \"WBC\", \"HGB\", \"HCT\", \"MCV\", \"PLT\" in the last 720 hours.  Lab Results   Component Value Date     01/20/2023    K 4.2 01/20/2023     01/20/2023    CO2 21 01/20/2023    BUN 29 (H) 01/20/2023    CREATININE 2.0 (H) 01/20/2023    GLUCOSE 120 (H) 01/20/2023    CALCIUM 10.1 01/20/2023    BILITOT 0.6 01/20/2023    ALKPHOS 120 01/20/2023    AST 14 (L) 01/20/2023    ALT 10 01/20/2023    LABGLOM 41 (A) 01/20/2023    GFRAA >60 02/26/2019    AGRATIO 0.9 (L) 01/20/2023    GLOB 4.0 02/26/2019     Lab Results   Component Value Date    INR 1.16 (H) 05/12/2019     Lab Results   Component Value Date    APTT 32.9 05/12/2019     No results found for: \"LABA1C\"  No results found for: \"VITD25\"       Assessment & Plan:  48 y.o. male who presents with    Diagnosis Orders   1. Acute pain of left shoulder  XR SHOULDER LEFT (MIN 2 VIEWS)      2. Arthritis of left sternoclavicular joint  XR STERNOCLAVICULAR JOINTS (MIN 3 VIEWS)    methylPREDNISolone (MEDROL, FANI,) 4 MG tablet    [facility]          No orders of the defined types were placed in this encounter.      Pain mostly seems to be coming from the SC joint  There is mild swelling and tenderness over the area and x-rays show mild arthritis  Low concern for infection  I suspect his trapezius/neck pain is just due to a compensatory mechanism, carrying his shoulder awkwardly due to the SC joint pain    Recommend management:  PT referral placed  Medrol Dosepak prescribed  Recommend ice, heat, and anti-inflammatories as needed for pain  Avoid aggravating activities    Follow-up on an as-needed basis.  If symptoms persist despite treatment options above, the patient was told to return to our office.      Ary Dumont PA-C  8/30/24  10:32 AM

## 2024-11-21 ENCOUNTER — APPOINTMENT (OUTPATIENT)
Dept: GENERAL RADIOLOGY | Age: 49
End: 2024-11-21
Payer: OTHER GOVERNMENT

## 2024-11-21 ENCOUNTER — HOSPITAL ENCOUNTER (EMERGENCY)
Age: 49
Discharge: HOME | End: 2024-11-21
Attending: EMERGENCY MEDICINE
Payer: OTHER GOVERNMENT

## 2024-11-21 VITALS
RESPIRATION RATE: 14 BRPM | HEART RATE: 60 BPM | SYSTOLIC BLOOD PRESSURE: 160 MMHG | HEIGHT: 73 IN | OXYGEN SATURATION: 96 % | DIASTOLIC BLOOD PRESSURE: 86 MMHG | BODY MASS INDEX: 30.65 KG/M2 | WEIGHT: 231.26 LBS | TEMPERATURE: 97.8 F

## 2024-11-21 DIAGNOSIS — R20.2 RIGHT HAND PARESTHESIA: ICD-10-CM

## 2024-11-21 DIAGNOSIS — M25.531 RIGHT WRIST PAIN: Primary | ICD-10-CM

## 2024-11-21 PROCEDURE — 73110 X-RAY EXAM OF WRIST: CPT

## 2024-11-21 PROCEDURE — 6370000000 HC RX 637 (ALT 250 FOR IP): Performed by: EMERGENCY MEDICINE

## 2024-11-21 PROCEDURE — 99283 EMERGENCY DEPT VISIT LOW MDM: CPT

## 2024-11-21 RX ORDER — ACETAMINOPHEN 500 MG
500 TABLET ORAL 4 TIMES DAILY PRN
Qty: 20 TABLET | Refills: 0 | Status: SHIPPED | OUTPATIENT
Start: 2024-11-21 | End: 2024-11-26

## 2024-11-21 RX ORDER — PREDNISONE 20 MG/1
60 TABLET ORAL ONCE
Status: COMPLETED | OUTPATIENT
Start: 2024-11-21 | End: 2024-11-21

## 2024-11-21 RX ORDER — CLOTRIMAZOLE AND BETAMETHASONE DIPROPIONATE 10; .64 MG/G; MG/G
CREAM TOPICAL 2 TIMES DAILY
COMMUNITY
Start: 2024-01-26

## 2024-11-21 RX ORDER — ACETAMINOPHEN 325 MG/1
650 TABLET ORAL ONCE
Status: COMPLETED | OUTPATIENT
Start: 2024-11-21 | End: 2024-11-21

## 2024-11-21 RX ORDER — SILDENAFIL 100 MG/1
100 TABLET, FILM COATED ORAL DAILY PRN
COMMUNITY
Start: 2024-04-03

## 2024-11-21 RX ORDER — PREDNISONE 50 MG/1
50 TABLET ORAL DAILY
Qty: 4 TABLET | Refills: 0 | Status: SHIPPED | OUTPATIENT
Start: 2024-11-21 | End: 2024-11-25

## 2024-11-21 RX ADMIN — ACETAMINOPHEN 325MG 650 MG: 325 TABLET ORAL at 12:30

## 2024-11-21 RX ADMIN — PREDNISONE 60 MG: 20 TABLET ORAL at 13:27

## 2024-11-21 ASSESSMENT — PAIN DESCRIPTION - DESCRIPTORS
DESCRIPTORS: SHARP;TINGLING
DESCRIPTORS: TINGLING

## 2024-11-21 ASSESSMENT — PAIN DESCRIPTION - LOCATION
LOCATION: WRIST;HAND
LOCATION: HAND

## 2024-11-21 ASSESSMENT — PAIN DESCRIPTION - ONSET: ONSET: ON-GOING

## 2024-11-21 ASSESSMENT — PAIN SCALES - GENERAL
PAINLEVEL_OUTOF10: 8

## 2024-11-21 ASSESSMENT — ENCOUNTER SYMPTOMS
VOICE CHANGE: 0
SHORTNESS OF BREATH: 0
WHEEZING: 0
TROUBLE SWALLOWING: 0

## 2024-11-21 ASSESSMENT — PAIN DESCRIPTION - PAIN TYPE: TYPE: ACUTE PAIN;CHRONIC PAIN

## 2024-11-21 ASSESSMENT — PAIN DESCRIPTION - ORIENTATION
ORIENTATION: RIGHT
ORIENTATION: RIGHT

## 2024-11-21 NOTE — ED PROVIDER NOTES
[facility]  eMERGENCY dEPARTMENT eNCOUnter      Pt Name: Berry Mendez Sr  MRN: [id number]  Birthdate 1975  Date of evaluation: 11/21/2024  Provider: Wenceslao Chisholm MD    CHIEF COMPLAINT       Chief Complaint   Patient presents with    Hand Pain     Pt c/o left wrist, hand numbness/pain. Pt states shooting pains and/or numbness comes out of nowhere. He states this causes him to drop objects, sometimes he can't feel what he is touching buttons etc.      HISTORY OF PRESENT ILLNESS   (Location/Symptom, Timing/Onset, Context/Setting, Quality, Duration, Modifying Factors, Severity)  Note limiting factors.     History obtained from: the patient    Berry Mendez Sr is a 48 y.o. male who reports history of surgery to both of his wrist including carpal tunnel release surgery to his right wrist in 2021 who reports for the past week he has had worsening pain in the right wrist and tingling in the hand.  Denies any falls or trauma.  Denies any fever.  Denies any complete numbness.  Report symptoms are moderate constant and unchanged      HPI    Nursing Notes were reviewed.    REVIEW OFSYSTEMS    (2-9 systems for level 4, 10 or more for level 5)     Review of Systems   Constitutional:  Negative for fever.   HENT:  Negative for drooling, trouble swallowing and voice change.    Eyes:  Negative for visual disturbance.   Respiratory:  Negative for shortness of breath and wheezing.    Cardiovascular:  Negative for chest pain and palpitations.   Musculoskeletal:  Positive for arthralgias.   Neurological:  Negative for seizures and syncope.   Psychiatric/Behavioral:  Negative for self-injury and suicidal ideas.        Except as noted above the remainder of the review of systems was reviewed and negative.       PAST MEDICAL HISTORY     Past Medical History:   Diagnosis Date    Asthma     Degenerative spinal arthritis     Histoplasmosis     HIV (human immunodeficiency virus infection) ([facility])

## 2024-11-21 NOTE — ED NOTES
Initial contact with pt. Wrist splint applied.  Pt discharged from ED to home. Pt verbalizes understanding to discharge instructions, teach back successful. Pt denies questions at this time. No acute distress noted. Resp even and unlabored. A/ox4. Pt instructed to follow-up as noted - return to ED if symptoms worsen. Pt verbalizes understanding. Discharged per ED MD with discharge instructions.

## 2025-01-09 ENCOUNTER — HOSPITAL ENCOUNTER (EMERGENCY)
Age: 50
Discharge: HOME | End: 2025-01-09
Attending: EMERGENCY MEDICINE
Payer: OTHER GOVERNMENT

## 2025-01-09 ENCOUNTER — APPOINTMENT (OUTPATIENT)
Dept: GENERAL RADIOLOGY | Age: 50
End: 2025-01-09
Payer: OTHER GOVERNMENT

## 2025-01-09 VITALS
SYSTOLIC BLOOD PRESSURE: 163 MMHG | BODY MASS INDEX: 29.38 KG/M2 | WEIGHT: 222.66 LBS | TEMPERATURE: 97.7 F | OXYGEN SATURATION: 100 % | DIASTOLIC BLOOD PRESSURE: 94 MMHG | RESPIRATION RATE: 16 BRPM | HEART RATE: 76 BPM

## 2025-01-09 DIAGNOSIS — M25.512 ACUTE PAIN OF LEFT SHOULDER: Primary | ICD-10-CM

## 2025-01-09 PROCEDURE — 99284 EMERGENCY DEPT VISIT MOD MDM: CPT

## 2025-01-09 PROCEDURE — 93005 ELECTROCARDIOGRAM TRACING: CPT | Performed by: EMERGENCY MEDICINE

## 2025-01-09 PROCEDURE — 73030 X-RAY EXAM OF SHOULDER: CPT

## 2025-01-09 PROCEDURE — 71046 X-RAY EXAM CHEST 2 VIEWS: CPT

## 2025-01-09 PROCEDURE — 6370000000 HC RX 637 (ALT 250 FOR IP): Performed by: EMERGENCY MEDICINE

## 2025-01-09 RX ORDER — METHOCARBAMOL 500 MG/1
500 TABLET, FILM COATED ORAL ONCE
Status: COMPLETED | OUTPATIENT
Start: 2025-01-09 | End: 2025-01-09

## 2025-01-09 RX ORDER — LIDOCAINE 4 G/G
2 PATCH TOPICAL ONCE
Status: DISCONTINUED | OUTPATIENT
Start: 2025-01-09 | End: 2025-01-09 | Stop reason: HOSPADM

## 2025-01-09 RX ORDER — ACETAMINOPHEN 500 MG
1000 TABLET ORAL ONCE
Status: COMPLETED | OUTPATIENT
Start: 2025-01-09 | End: 2025-01-09

## 2025-01-09 RX ORDER — METHOCARBAMOL 500 MG/1
500 TABLET, FILM COATED ORAL NIGHTLY PRN
Qty: 5 TABLET | Refills: 0 | Status: SHIPPED | OUTPATIENT
Start: 2025-01-09 | End: 2025-01-14

## 2025-01-09 RX ORDER — ACETAMINOPHEN 500 MG
1000 TABLET ORAL 3 TIMES DAILY
Qty: 30 TABLET | Refills: 0 | Status: SHIPPED | OUTPATIENT
Start: 2025-01-09 | End: 2025-01-14

## 2025-01-09 RX ADMIN — ACETAMINOPHEN 1000 MG: 500 TABLET ORAL at 14:00

## 2025-01-09 RX ADMIN — METHOCARBAMOL 500 MG: 500 TABLET ORAL at 14:00

## 2025-01-09 ASSESSMENT — PAIN - FUNCTIONAL ASSESSMENT
PAIN_FUNCTIONAL_ASSESSMENT: 0-10
PAIN_FUNCTIONAL_ASSESSMENT: PREVENTS OR INTERFERES SOME ACTIVE ACTIVITIES AND ADLS

## 2025-01-09 ASSESSMENT — PAIN DESCRIPTION - DESCRIPTORS
DESCRIPTORS: ACHING
DESCRIPTORS: ACHING

## 2025-01-09 ASSESSMENT — PAIN DESCRIPTION - PAIN TYPE: TYPE: ACUTE PAIN

## 2025-01-09 ASSESSMENT — PAIN SCALES - GENERAL
PAINLEVEL_OUTOF10: 10
PAINLEVEL_OUTOF10: 10

## 2025-01-09 ASSESSMENT — PAIN DESCRIPTION - ORIENTATION
ORIENTATION: LEFT
ORIENTATION: LEFT

## 2025-01-09 ASSESSMENT — PAIN DESCRIPTION - LOCATION
LOCATION: SHOULDER
LOCATION: SHOULDER

## 2025-01-09 NOTE — DISCHARGE INSTRUCTIONS
Your prescription has been sent to [facility].    You can also consider applying ice or cool pack to your shoulder for 5-10 minutes 3-4 times per day for the next few days    Be sure to contact your primary care provider to arrange for a follow up visit.    If you have any new or worsening issues after going home don't hesitate to return here for reevaluation at any time 24/7!

## 2025-01-10 LAB
EKG ATRIAL RATE: 58 BPM
EKG DIAGNOSIS: NORMAL
EKG P AXIS: 16 DEGREES
EKG P-R INTERVAL: 170 MS
EKG Q-T INTERVAL: 420 MS
EKG QRS DURATION: 90 MS
EKG QTC CALCULATION (BAZETT): 412 MS
EKG R AXIS: 33 DEGREES
EKG T AXIS: 23 DEGREES
EKG VENTRICULAR RATE: 58 BPM

## 2025-01-10 PROCEDURE — 93010 ELECTROCARDIOGRAM REPORT: CPT | Performed by: INTERNAL MEDICINE

## 2025-01-13 NOTE — ED PROVIDER NOTES
[facility] EMERGENCY DEPARTMENT    Name: Berry Mendez Sr : 1975 MRN: [id number] Date of Service: 2025    Initial VS: BP: (!) 163/94, Temp: 97.7 °F (36.5 °C), Pulse: 76, Respirations: 16, SpO2: 100 %     CC: shoulder pain    HPI: this patient is a 49 y.o. male presenting to the ED from home. Mr. Mendez tells me that on the evening of 2025 he spent a lot of time outdoors shoveling snow. He notes that he felt fine immediately after he finished shoveling. The following morning, however, he began to experience some pain and soreness to the front (anterior aspect) of his left shoulder. Since then he has continued to have intermittent pain to that shoulder. This pain seems to be most prominent with movement of his shoulder and/or when he is carrying heavier objects. This pain does not seem to radiate to any other areas of his body. As his shoulder pain did not seem to be improving over time he came here this afternoon to be evaluated. He has not appreciated any other changes from his usual state of health and he denies other current complaints. Specifically, he has not noticed any associated weakness, numbness, or difficulty moving his affected extremity.  _____________________________________________________________________    Past Medical History:   Diagnosis Date    Asthma     Degenerative spinal arthritis     Histoplasmosis     HIV (human immunodeficiency virus infection) (HCC)     Hypertension     Overweight     Vitamin D deficiency       Past Surgical History:   Procedure Laterality Date    ANKLE SURGERY Right     CARPAL TUNNEL RELEASE Right 2021    RIGHT CARPAL TUNNEL RELEASE performed by David Elliott MD at [facility] OR    CONDYLOMA EXCISION      OTHER SURGICAL HISTORY Right 2018    Right First dorsal extensor compartment tendon release.    WRIST SURGERY Left 10/15/2019    LEFT FIRST DORSAL COMPARTMENT (DEQUERVAIN'S) RELEASE performed by David Elliott MD at [facility] OR     Family

## 2025-01-14 ASSESSMENT — ENCOUNTER SYMPTOMS
SHORTNESS OF BREATH: 0
VOMITING: 0
COUGH: 0
NAUSEA: 0
ABDOMINAL PAIN: 0

## 2025-02-20 ENCOUNTER — HOSPITAL ENCOUNTER (EMERGENCY)
Age: 50
Discharge: HOME | End: 2025-02-20
Attending: EMERGENCY MEDICINE
Payer: OTHER GOVERNMENT

## 2025-02-20 ENCOUNTER — APPOINTMENT (OUTPATIENT)
Dept: GENERAL RADIOLOGY | Age: 50
End: 2025-02-20
Payer: OTHER GOVERNMENT

## 2025-02-20 VITALS
WEIGHT: 216.93 LBS | OXYGEN SATURATION: 98 % | DIASTOLIC BLOOD PRESSURE: 76 MMHG | RESPIRATION RATE: 16 BRPM | SYSTOLIC BLOOD PRESSURE: 140 MMHG | TEMPERATURE: 98.2 F | HEART RATE: 59 BPM | BODY MASS INDEX: 28.62 KG/M2
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
## 2025-02-20 DIAGNOSIS — S80.01XA CONTUSION OF RIGHT KNEE, INITIAL ENCOUNTER: Primary | ICD-10-CM

## 2025-02-20 PROCEDURE — 73562 X-RAY EXAM OF KNEE 3: CPT

## 2025-02-20 PROCEDURE — 6370000000 HC RX 637 (ALT 250 FOR IP): Performed by: EMERGENCY MEDICINE

## 2025-02-20 PROCEDURE — 99283 EMERGENCY DEPT VISIT LOW MDM: CPT

## 2025-02-20 RX ORDER — ACETAMINOPHEN 325 MG/1
650 TABLET ORAL ONCE
Status: COMPLETED | OUTPATIENT
Start: 2025-02-20 | End: 2025-02-20

## 2025-02-20 RX ADMIN — ACETAMINOPHEN 650 MG: 325 TABLET ORAL at 11:28

## 2025-02-20 ASSESSMENT — PAIN SCALES - GENERAL: PAINLEVEL_OUTOF10: 6

## 2025-02-20 NOTE — ED NOTES
Patient DCed from ED at this time. Discussed AVS, follow up, and scripts. They verbalized understanding. Reinforced that should symptoms persist or worsen to return to the ED. They verbalized understanding. Patient ambulated out of ED. RN thanked patient for choosing [facility].

## 2025-02-20 NOTE — ED PROVIDER NOTES
EMERGENCY DEPARTMENT ENCOUNTER     [facility] EMERGENCY DEPARTMENT     Pt Name: Berry Mendez Sr   MRN: [id number]   Birthdate 1975   Date of evaluation: 2/20/2025   Provider: Benigno English MD   PCP: Itzel Gardiner MD   Note Started: 11:09 AM EST 2/20/25     CHIEF COMPLAINT     Chief Complaint   Patient presents with    Knee Injury     R knee, slipped on ice last night at work        HISTORY OF PRESENT ILLNESS:  History from : Patient   Limitations to history : None     Berry Mendez Sr is a 49 y.o. male who  has a past medical history of Asthma, Degenerative spinal arthritis, Histoplasmosis, HIV (human immunodeficiency virus infection) (HCC), Hypertension, Overweight, and Vitamin D deficiency. presents to the emergency room complaining of right knee pain after a fall yesterday.  Patient states that he slipped on some ice and ended up landing on his right knee.  Patient states he has been having pain in the right knee which is an aching throbbing pain that gets worse with ambulation and better with resting.  He denies any numbness or weakness in the right lower extremity.  Denies taking medication for pain control prior to coming to the emergency room.  He denies hitting his head or any loss of consciousness.  Denies any neck pain or back pain.  No previous surgery on the right leg.    Nursing Notes were all reviewed and agreed with or any disagreements were addressed in the HPI.     ROS: Positives and Pertinent negatives as per HPI.    PAST MEDICAL HISTORY     Past medical history:  has a past medical history of Asthma, Degenerative spinal arthritis, Histoplasmosis, HIV (human immunodeficiency virus infection) (HCC), Hypertension, Overweight, and Vitamin D deficiency.    Past surgical history:  has a past surgical history that includes Ankle surgery (Right); other surgical history (Right, 01/23/2018); Wrist surgery (Left, 10/15/2019); Carpal tunnel release (Right, 11/30/2021); and

## 2025-02-20 NOTE — DISCHARGE INSTRUCTIONS
Thank you for visiting [facility] Emergency Department.    You need to call in morning to make appointment as directed with appropriate doctor.    Should you have any questions regarding your care or further treatment, please call [facility] Emergency Department at [phone].    Take any medications as prescribed, if given any, otherwise for pain Use ibuprofen or Tylenol (unless prescribed medications that have Tylenol in it).  You can take over the counter Ibuprofen (advil) tablets (4 tablets every 8 hours or 3 tablets every 6 hours or 2 tablets every 4 hours)    Return to ED if symptoms worsen, do not improve, fever > 101.5, excessive nausea or vomiting, and unable to follow up with your physician, or any other care or concern.

## 2025-03-19 ENCOUNTER — APPOINTMENT (OUTPATIENT)
Dept: GENERAL RADIOLOGY | Age: 50
End: 2025-03-19
Payer: COMMERCIAL

## 2025-03-19 ENCOUNTER — HOSPITAL ENCOUNTER (EMERGENCY)
Age: 50
Discharge: HOME | End: 2025-03-20
Attending: EMERGENCY MEDICINE
Payer: COMMERCIAL

## 2025-03-19 VITALS
TEMPERATURE: 98.6 F | OXYGEN SATURATION: 99 % | BODY MASS INDEX: 28.55 KG/M2 | RESPIRATION RATE: 18 BRPM | HEART RATE: 87 BPM | DIASTOLIC BLOOD PRESSURE: 73 MMHG | SYSTOLIC BLOOD PRESSURE: 122 MMHG | WEIGHT: 215.39 LBS | HEIGHT: 73 IN

## 2025-03-19 DIAGNOSIS — E86.0 DEHYDRATION: ICD-10-CM

## 2025-03-19 DIAGNOSIS — R52 BODY ACHES: Primary | ICD-10-CM

## 2025-03-19 LAB
ALBUMIN SERPL-MCNC: 3.7 G/DL (ref 3.4–5)
ALBUMIN/GLOB SERPL: 1.1 {RATIO} (ref 1.1–2.2)
ALP SERPL-CCNC: 72 U/L (ref 40–129)
ALT SERPL-CCNC: 13 U/L (ref 10–40)
ANION GAP SERPL CALCULATED.3IONS-SCNC: 13 MMOL/L (ref 3–16)
AST SERPL-CCNC: 14 U/L (ref 15–37)
BASOPHILS # BLD: 0.1 K/UL (ref 0–0.2)
BASOPHILS NFR BLD: 0.9 %
BILIRUB SERPL-MCNC: 0.4 MG/DL (ref 0–1)
BILIRUB UR QL STRIP.AUTO: NEGATIVE
BUN SERPL-MCNC: 33 MG/DL (ref 7–20)
CALCIUM SERPL-MCNC: 9 MG/DL (ref 8.3–10.6)
CHLORIDE SERPL-SCNC: 102 MMOL/L (ref 99–110)
CK SERPL-CCNC: 64 U/L (ref 39–308)
CLARITY UR: CLEAR
CO2 SERPL-SCNC: 22 MMOL/L (ref 21–32)
COLOR UR: YELLOW
CREAT SERPL-MCNC: 2.1 MG/DL (ref 0.9–1.3)
DEPRECATED RDW RBC AUTO: 14.7 % (ref 12.4–15.4)
EOSINOPHIL # BLD: 0.1 K/UL (ref 0–0.6)
EOSINOPHIL NFR BLD: 1.5 %
EPI CELLS #/AREA URNS HPF: NORMAL /HPF (ref 0–5)
FLUAV RNA UPPER RESP QL NAA+PROBE: NEGATIVE
FLUBV AG NPH QL: NEGATIVE
GFR SERPLBLD CREATININE-BSD FMLA CKD-EPI: 38 ML/MIN/{1.73_M2}
GLUCOSE SERPL-MCNC: 104 MG/DL (ref 70–99)
GLUCOSE UR STRIP.AUTO-MCNC: NEGATIVE MG/DL
HCT VFR BLD AUTO: 37.6 % (ref 40.5–52.5)
HGB BLD-MCNC: 12.7 G/DL (ref 13.5–17.5)
HGB UR QL STRIP.AUTO: ABNORMAL
KETONES UR STRIP.AUTO-MCNC: NEGATIVE MG/DL
LACTATE BLDV-SCNC: 1.2 MMOL/L (ref 0.4–2)
LEUKOCYTE ESTERASE UR QL STRIP.AUTO: NEGATIVE
LYMPHOCYTES # BLD: 1.9 K/UL (ref 1–5.1)
LYMPHOCYTES NFR BLD: 23.9 %
MCH RBC QN AUTO: 33.8 PG (ref 26–34)
MCHC RBC AUTO-ENTMCNC: 33.7 G/DL (ref 31–36)
MCV RBC AUTO: 100.3 FL (ref 80–100)
MONOCYTES # BLD: 1.1 K/UL (ref 0–1.3)
MONOCYTES NFR BLD: 14 %
NEUTROPHILS # BLD: 4.8 K/UL (ref 1.7–7.7)
NEUTROPHILS NFR BLD: 59.7 %
NITRITE UR QL STRIP.AUTO: NEGATIVE
PH UR STRIP.AUTO: 6 [PH] (ref 5–8)
PLATELET # BLD AUTO: 257 K/UL (ref 135–450)
PMV BLD AUTO: 7.6 FL (ref 5–10.5)
POTASSIUM SERPL-SCNC: 4 MMOL/L (ref 3.5–5.1)
PROT SERPL-MCNC: 7 G/DL (ref 6.4–8.2)
PROT UR STRIP.AUTO-MCNC: NEGATIVE MG/DL
RBC # BLD AUTO: 3.75 M/UL (ref 4.2–5.9)
RBC #/AREA URNS HPF: NORMAL /HPF (ref 0–4)
SARS-COV-2 RDRP RESP QL NAA+PROBE: NOT DETECTED
SODIUM SERPL-SCNC: 137 MMOL/L (ref 136–145)
SP GR UR STRIP.AUTO: 1.02 (ref 1–1.03)
UA COMPLETE W REFLEX CULTURE PNL UR: ABNORMAL
UA DIPSTICK W REFLEX MICRO PNL UR: YES
URN SPEC COLLECT METH UR: ABNORMAL
UROBILINOGEN UR STRIP-ACNC: 0.2 E.U./DL
WBC # BLD AUTO: 8 K/UL (ref 4–11)
WBC #/AREA URNS HPF: NORMAL /HPF (ref 0–5)

## 2025-03-19 PROCEDURE — 2580000003 HC RX 258: Performed by: EMERGENCY MEDICINE

## 2025-03-19 PROCEDURE — 6370000000 HC RX 637 (ALT 250 FOR IP): Performed by: EMERGENCY MEDICINE

## 2025-03-19 PROCEDURE — 99284 EMERGENCY DEPT VISIT MOD MDM: CPT

## 2025-03-19 PROCEDURE — 36415 COLL VENOUS BLD VENIPUNCTURE: CPT

## 2025-03-19 PROCEDURE — 71045 X-RAY EXAM CHEST 1 VIEW: CPT

## 2025-03-19 PROCEDURE — 87635 SARS-COV-2 COVID-19 AMP PRB: CPT

## 2025-03-19 PROCEDURE — 86360 T CELL ABSOLUTE COUNT/RATIO: CPT

## 2025-03-19 PROCEDURE — 83605 ASSAY OF LACTIC ACID: CPT

## 2025-03-19 PROCEDURE — 96361 HYDRATE IV INFUSION ADD-ON: CPT

## 2025-03-19 PROCEDURE — 80053 COMPREHEN METABOLIC PANEL: CPT

## 2025-03-19 PROCEDURE — 85025 COMPLETE CBC W/AUTO DIFF WBC: CPT

## 2025-03-19 PROCEDURE — 6360000002 HC RX W HCPCS: Performed by: EMERGENCY MEDICINE

## 2025-03-19 PROCEDURE — 81001 URINALYSIS AUTO W/SCOPE: CPT

## 2025-03-19 PROCEDURE — 96374 THER/PROPH/DIAG INJ IV PUSH: CPT

## 2025-03-19 PROCEDURE — 82550 ASSAY OF CK (CPK): CPT

## 2025-03-19 PROCEDURE — 87804 INFLUENZA ASSAY W/OPTIC: CPT

## 2025-03-19 RX ORDER — 0.9 % SODIUM CHLORIDE 0.9 %
1000 INTRAVENOUS SOLUTION INTRAVENOUS ONCE
Status: COMPLETED | OUTPATIENT
Start: 2025-03-19 | End: 2025-03-20

## 2025-03-19 RX ORDER — 0.9 % SODIUM CHLORIDE 0.9 %
1000 INTRAVENOUS SOLUTION INTRAVENOUS ONCE
Status: COMPLETED | OUTPATIENT
Start: 2025-03-19 | End: 2025-03-19

## 2025-03-19 RX ORDER — ACETAMINOPHEN 500 MG
500 TABLET ORAL EVERY 4 HOURS PRN
Qty: 120 TABLET | Refills: 0 | Status: SHIPPED | OUTPATIENT
Start: 2025-03-19

## 2025-03-19 RX ORDER — ONDANSETRON 2 MG/ML
4 INJECTION INTRAMUSCULAR; INTRAVENOUS ONCE
Status: COMPLETED | OUTPATIENT
Start: 2025-03-19 | End: 2025-03-19

## 2025-03-19 RX ORDER — ACETAMINOPHEN 325 MG/1
650 TABLET ORAL ONCE
Status: COMPLETED | OUTPATIENT
Start: 2025-03-19 | End: 2025-03-19

## 2025-03-19 RX ORDER — ONDANSETRON 4 MG/1
4 TABLET, ORALLY DISINTEGRATING ORAL 3 TIMES DAILY PRN
Qty: 21 TABLET | Refills: 0 | Status: SHIPPED | OUTPATIENT
Start: 2025-03-19

## 2025-03-19 RX ADMIN — SODIUM CHLORIDE 1000 ML: 9 INJECTION, SOLUTION INTRAVENOUS at 22:35

## 2025-03-19 RX ADMIN — ONDANSETRON 4 MG: 2 INJECTION, SOLUTION INTRAMUSCULAR; INTRAVENOUS at 23:25

## 2025-03-19 RX ADMIN — SODIUM CHLORIDE 1000 ML: 9 INJECTION, SOLUTION INTRAVENOUS at 23:26

## 2025-03-19 RX ADMIN — ACETAMINOPHEN 650 MG: 325 TABLET ORAL at 23:25

## 2025-03-19 ASSESSMENT — PAIN - FUNCTIONAL ASSESSMENT: PAIN_FUNCTIONAL_ASSESSMENT: 0-10

## 2025-03-19 ASSESSMENT — PAIN SCALES - GENERAL: PAINLEVEL_OUTOF10: 5

## 2025-03-19 ASSESSMENT — PAIN DESCRIPTION - LOCATION: LOCATION: GENERALIZED

## 2025-03-19 ASSESSMENT — PAIN DESCRIPTION - DESCRIPTORS: DESCRIPTORS: ACHING

## 2025-03-19 ASSESSMENT — LIFESTYLE VARIABLES
HOW OFTEN DO YOU HAVE A DRINK CONTAINING ALCOHOL: NEVER
HOW MANY STANDARD DRINKS CONTAINING ALCOHOL DO YOU HAVE ON A TYPICAL DAY: PATIENT DOES NOT DRINK

## 2025-03-20 NOTE — DISCHARGE INSTRUCTIONS
Take Zofran as needed to improve appetite so that you may eat small regular meals.  Keep drinking plenty of fluids including something with electrolytes like Gatorade.  Follow-up with your regular doctor as soon as possible.  Return to emergency department for any new or worrisome symptoms such as decreased urination, fevers, cough, vomiting, severe abdominal pain.  
82F with HTN, HLD, glaucoma admitted after fall, found to have large chronic L SDH with 8mm MLS s/p francis hole 12/29.

## 2025-03-20 NOTE — ED PROVIDER NOTES
[facility] EMERGENCY DEPARTMENT     EMERGENCY DEPARTMENT ENCOUNTER            Pt Name: Berry Mendez Sr   MRN: [id number]   Birthdate 1975   Date of evaluation: 3/19/2025   Provider: Nichol Nash MD   PCP: Itzel Gardiner MD   Note Started: 10:26 PM EDT 3/19/25          CHIEF COMPLAINT     Chief Complaint   Patient presents with    Generalized Body Aches     Pt states he went to urgent care last week for flu like symptoms. Pt tested negative for flu and covid. Pt was diagnosed with sinus infection given a z-pack and told to drink plenty of fluids. Pt states that he has been drinking gatorade and pedalyte. Pt states that he started feeling sick 2 days ago. Pt is A&Ox4, UAL, VSS on RA.              HISTORY OF PRESENT ILLNESS:   History from : Patient   Limitations to history : None     Berry Mendez Sr is a 49 y.o. male who presents with bodyaches, chills, poor appetite and watery diarrhea.  Patient is HIV positive.  Reports his viral levels were undetected at last check and he has been compliant with his medications.  Patient was given a Z-Christian when he was seen at urgent care last week for presumed sinus infection and he tested negative for COVID and flu.  Patient denies any nausea or vomiting.  Denies sore throat denies any other symptoms.    Nursing Notes were all reviewed and agreed with, or any disagreements were addressed in the HPI.     REVIEW OF SYSTEMS :    Positives and Pertinent negatives as per HPI.      MEDICAL HISTORY   has a past medical history of Asthma, Degenerative spinal arthritis, Histoplasmosis, HIV (human immunodeficiency virus infection) (HCC), Hypertension, Overweight, and Vitamin D deficiency.    Past Surgical History:   Procedure Laterality Date    ANKLE SURGERY Right     CARPAL TUNNEL RELEASE Right 11/30/2021    RIGHT CARPAL TUNNEL RELEASE performed by David Elliott MD at [facility] OR    CONDYLOMA EXCISION      OTHER SURGICAL HISTORY Right 01/23/2018    Right First

## 2025-03-20 NOTE — ED TRIAGE NOTES
Pt states he went to urgent care last week for flu like symptoms. Pt tested negative for flu and covid. Pt was diagnosed with sinus infection given a z-pack and told to drink plenty of fluids. Pt states that he has been drinking gatorade and pedalyte. Pt states that he started feeling sick 2 days ago. Pt states that he feels like he gets a fever, body. Pt is A&Ox4, UAL, VSS on RA.

## 2025-04-04 ENCOUNTER — HOSPITAL ENCOUNTER (EMERGENCY)
Age: 50
Discharge: HOME | End: 2025-04-04
Attending: STUDENT IN AN ORGANIZED HEALTH CARE EDUCATION/TRAINING PROGRAM
Payer: COMMERCIAL

## 2025-04-04 VITALS
HEART RATE: 79 BPM | OXYGEN SATURATION: 100 % | RESPIRATION RATE: 16 BRPM | BODY MASS INDEX: 27.2 KG/M2 | TEMPERATURE: 98.4 F | SYSTOLIC BLOOD PRESSURE: 162 MMHG | DIASTOLIC BLOOD PRESSURE: 77 MMHG | HEIGHT: 73 IN | WEIGHT: 205.25 LBS

## 2025-04-04 DIAGNOSIS — B34.9 VIRAL SYNDROME: Primary | ICD-10-CM

## 2025-04-04 LAB
FLUAV RNA UPPER RESP QL NAA+PROBE: NEGATIVE
FLUBV AG NPH QL: NEGATIVE
SARS-COV-2 RDRP RESP QL NAA+PROBE: NOT DETECTED

## 2025-04-04 PROCEDURE — 99283 EMERGENCY DEPT VISIT LOW MDM: CPT

## 2025-04-04 PROCEDURE — 87635 SARS-COV-2 COVID-19 AMP PRB: CPT

## 2025-04-04 PROCEDURE — 6370000000 HC RX 637 (ALT 250 FOR IP): Performed by: STUDENT IN AN ORGANIZED HEALTH CARE EDUCATION/TRAINING PROGRAM

## 2025-04-04 PROCEDURE — 87804 INFLUENZA ASSAY W/OPTIC: CPT

## 2025-04-04 RX ORDER — ACETAMINOPHEN 325 MG/1
650 TABLET ORAL ONCE
Status: COMPLETED | OUTPATIENT
Start: 2025-04-04 | End: 2025-04-04

## 2025-04-04 RX ORDER — ONDANSETRON 4 MG/1
4 TABLET, FILM COATED ORAL 3 TIMES DAILY PRN
Qty: 15 TABLET | Refills: 0 | Status: SHIPPED | OUTPATIENT
Start: 2025-04-04

## 2025-04-04 RX ORDER — ONDANSETRON 4 MG/1
4 TABLET, ORALLY DISINTEGRATING ORAL ONCE
Status: COMPLETED | OUTPATIENT
Start: 2025-04-04 | End: 2025-04-04

## 2025-04-04 RX ADMIN — ACETAMINOPHEN 650 MG: 325 TABLET ORAL at 20:32

## 2025-04-04 RX ADMIN — ONDANSETRON 4 MG: 4 TABLET, ORALLY DISINTEGRATING ORAL at 20:32

## 2025-04-04 ASSESSMENT — LIFESTYLE VARIABLES
HOW MANY STANDARD DRINKS CONTAINING ALCOHOL DO YOU HAVE ON A TYPICAL DAY: PATIENT DOES NOT DRINK
HOW OFTEN DO YOU HAVE A DRINK CONTAINING ALCOHOL: NEVER

## 2025-04-04 ASSESSMENT — PAIN DESCRIPTION - LOCATION: LOCATION: GENERALIZED

## 2025-04-04 ASSESSMENT — PAIN DESCRIPTION - DESCRIPTORS: DESCRIPTORS: ACHING

## 2025-04-04 ASSESSMENT — PAIN - FUNCTIONAL ASSESSMENT: PAIN_FUNCTIONAL_ASSESSMENT: 0-10

## 2025-04-04 ASSESSMENT — PAIN SCALES - GENERAL: PAINLEVEL_OUTOF10: 5

## 2025-04-04 NOTE — ED TRIAGE NOTES
Pt states that he has been sick since this in the middle of the night las night. Pt states that he has been able to eat but then he has diarrhea. Pt states that he vomited one time. Pt states that he has generalized body aches. Pt denies chest pain, SOB, dizziness, vision changes, headache. Pt is A&Ox4, UAL, VSS on RA.

## 2025-04-05 NOTE — DISCHARGE INSTRUCTIONS
You were seen today in the emergency department due to feeling poorly.  Your COVID and flu swabs were negative, however it is likely that your symptoms are being caused by a virus.  You may use Tylenol at home to help control your symptoms.  Please note that a course of a medication called Zofran has been sent to your pharmacy will help with nausea, please take it as prescribed.  Please stay well-hydrated.  Follow-up with your regular doctor within the next 48 hours her symptoms do not improve.  Please return to the emergency department if experience any further concerning symptoms

## 2025-04-05 NOTE — ED PROVIDER NOTES
[address] [address] [phone]    Call   For follow up appointment, As needed        Electronically Signed: Tom Cooper DO, 04/04/25, 9:29 PM    This report has been produced using speech recognition software and may contain errors related to that system including errors in grammar, punctuation, and spelling, as well as words and phrases that may be inappropriate. If there are any questions or concerns please feel free to contact the dictating provider for clarification.       Tom Cooper DO  04/04/25 7510

## 2025-04-05 NOTE — ED NOTES
D/C: Order noted for d/c. Pt confirmed d/c paperwork has correct name. Discharge and education instructions reviewed with patient. Teach-back successful.  Pt verbalized understanding and denied questions at this time. No acute distress noted. Patient instructed to follow-up as noted - return to emergency department if symptoms worsen. Patient verbalized understanding. Discharged per EDMD with discharge instructions. Pt discharged to private vehicle. Patient stable upon departure. Thanked patient for [facility] for care. Provider aware of patient pain at time of discharge.

## 2025-04-08 ENCOUNTER — HOSPITAL ENCOUNTER (INPATIENT)
Age: 50
LOS: 2 days | Discharge: HOME | DRG: 683 | End: 2025-04-10
Attending: EMERGENCY MEDICINE | Admitting: HOSPITALIST
Payer: OTHER GOVERNMENT

## 2025-04-08 DIAGNOSIS — B20 HUMAN IMMUNODEFICIENCY VIRUS (HIV) DISEASE (HCC): ICD-10-CM

## 2025-04-08 DIAGNOSIS — N17.9 AKI (ACUTE KIDNEY INJURY): Primary | ICD-10-CM

## 2025-04-08 LAB
ALBUMIN SERPL-MCNC: 4 G/DL (ref 3.4–5)
ALBUMIN/GLOB SERPL: 1.1 {RATIO} (ref 1.1–2.2)
ALP SERPL-CCNC: 85 U/L (ref 40–129)
ALT SERPL-CCNC: 20 U/L (ref 10–40)
ANION GAP SERPL CALCULATED.3IONS-SCNC: 15 MMOL/L (ref 3–16)
AST SERPL-CCNC: 29 U/L (ref 15–37)
BASOPHILS # BLD: 0.1 K/UL (ref 0–0.2)
BASOPHILS NFR BLD: 1.7 %
BILIRUB SERPL-MCNC: 0.3 MG/DL (ref 0–1)
BILIRUB UR QL STRIP.AUTO: NEGATIVE
BUN SERPL-MCNC: 53 MG/DL (ref 7–20)
C DIFF TOX A+B STL QL IA: NORMAL
C DIFF TOX GENS STL QL NAA+PROBE: NORMAL
CALCIUM SERPL-MCNC: 8.6 MG/DL (ref 8.3–10.6)
CHLORIDE SERPL-SCNC: 103 MMOL/L (ref 99–110)
CLARITY UR: CLEAR
CO2 SERPL-SCNC: 17 MMOL/L (ref 21–32)
COLOR UR: YELLOW
CREAT SERPL-MCNC: 3.8 MG/DL (ref 0.9–1.3)
DEPRECATED RDW RBC AUTO: 15 % (ref 12.4–15.4)
EOSINOPHIL # BLD: 0.1 K/UL (ref 0–0.6)
EOSINOPHIL NFR BLD: 1.8 %
EPI CELLS #/AREA URNS HPF: NORMAL /HPF (ref 0–5)
GFR SERPLBLD CREATININE-BSD FMLA CKD-EPI: 19 ML/MIN/{1.73_M2}
GI PATHOGENS PNL STL NAA+PROBE: NORMAL
GLUCOSE SERPL-MCNC: 103 MG/DL (ref 70–99)
GLUCOSE UR STRIP.AUTO-MCNC: NEGATIVE MG/DL
HCT VFR BLD AUTO: 39.8 % (ref 40.5–52.5)
HGB BLD-MCNC: 13.5 G/DL (ref 13.5–17.5)
HGB UR QL STRIP.AUTO: NEGATIVE
KETONES UR STRIP.AUTO-MCNC: NEGATIVE MG/DL
LEUKOCYTE ESTERASE UR QL STRIP.AUTO: NEGATIVE
LYMPHOCYTES # BLD: 1.8 K/UL (ref 1–5.1)
LYMPHOCYTES NFR BLD: 40.5 %
MCH RBC QN AUTO: 34.1 PG (ref 26–34)
MCHC RBC AUTO-ENTMCNC: 33.9 G/DL (ref 31–36)
MCV RBC AUTO: 100.3 FL (ref 80–100)
MONOCYTES # BLD: 0.6 K/UL (ref 0–1.3)
MONOCYTES NFR BLD: 13.5 %
NEUTROPHILS # BLD: 1.9 K/UL (ref 1.7–7.7)
NEUTROPHILS NFR BLD: 42.5 %
NITRITE UR QL STRIP.AUTO: NEGATIVE
PH UR STRIP.AUTO: 6 [PH] (ref 5–8)
PLATELET # BLD AUTO: 287 K/UL (ref 135–450)
PMV BLD AUTO: 7.8 FL (ref 5–10.5)
POTASSIUM SERPL-SCNC: 4 MMOL/L (ref 3.5–5.1)
PROT SERPL-MCNC: 7.6 G/DL (ref 6.4–8.2)
PROT UR STRIP.AUTO-MCNC: NEGATIVE MG/DL
RBC # BLD AUTO: 3.97 M/UL (ref 4.2–5.9)
RBC #/AREA URNS HPF: NORMAL /HPF (ref 0–4)
SODIUM SERPL-SCNC: 135 MMOL/L (ref 136–145)
SP GR UR STRIP.AUTO: 1.02 (ref 1–1.03)
TRICHOMONAS #/AREA URNS HPF: NORMAL /HPF
UA DIPSTICK W REFLEX MICRO PNL UR: NORMAL
URN SPEC COLLECT METH UR: NORMAL
UROBILINOGEN UR STRIP-ACNC: 0.2 E.U./DL
WBC # BLD AUTO: 4.5 K/UL (ref 4–11)
WBC #/AREA URNS HPF: NORMAL /HPF (ref 0–5)

## 2025-04-08 PROCEDURE — 2500000003 HC RX 250 WO HCPCS: Performed by: HOSPITALIST

## 2025-04-08 PROCEDURE — 2580000003 HC RX 258: Performed by: EMERGENCY MEDICINE

## 2025-04-08 PROCEDURE — 6370000000 HC RX 637 (ALT 250 FOR IP): Performed by: HOSPITALIST

## 2025-04-08 PROCEDURE — 2580000003 HC RX 258: Performed by: HOSPITALIST

## 2025-04-08 PROCEDURE — 1200000000 HC SEMI PRIVATE

## 2025-04-08 PROCEDURE — 36415 COLL VENOUS BLD VENIPUNCTURE: CPT

## 2025-04-08 PROCEDURE — 87336 ENTAMOEB HIST DISPR AG IA: CPT

## 2025-04-08 PROCEDURE — 85025 COMPLETE CBC W/AUTO DIFF WBC: CPT

## 2025-04-08 PROCEDURE — 99285 EMERGENCY DEPT VISIT HI MDM: CPT

## 2025-04-08 PROCEDURE — 87506 IADNA-DNA/RNA PROBE TQ 6-11: CPT

## 2025-04-08 PROCEDURE — 81001 URINALYSIS AUTO W/SCOPE: CPT

## 2025-04-08 PROCEDURE — 87493 C DIFF AMPLIFIED PROBE: CPT

## 2025-04-08 PROCEDURE — 87449 NOS EACH ORGANISM AG IA: CPT

## 2025-04-08 PROCEDURE — 80053 COMPREHEN METABOLIC PANEL: CPT

## 2025-04-08 PROCEDURE — 87324 CLOSTRIDIUM AG IA: CPT

## 2025-04-08 PROCEDURE — 87328 CRYPTOSPORIDIUM AG IA: CPT

## 2025-04-08 RX ORDER — POLYETHYLENE GLYCOL 3350 17 G/17G
17 POWDER, FOR SOLUTION ORAL DAILY PRN
Status: DISCONTINUED | OUTPATIENT
Start: 2025-04-08 | End: 2025-04-10 | Stop reason: HOSPADM

## 2025-04-08 RX ORDER — SODIUM CHLORIDE 9 MG/ML
INJECTION, SOLUTION INTRAVENOUS CONTINUOUS
Status: DISCONTINUED | OUTPATIENT
Start: 2025-04-08 | End: 2025-04-10 | Stop reason: HOSPADM

## 2025-04-08 RX ORDER — 0.9 % SODIUM CHLORIDE 0.9 %
1000 INTRAVENOUS SOLUTION INTRAVENOUS ONCE
Status: COMPLETED | OUTPATIENT
Start: 2025-04-08 | End: 2025-04-08

## 2025-04-08 RX ORDER — ONDANSETRON 2 MG/ML
4 INJECTION INTRAMUSCULAR; INTRAVENOUS EVERY 6 HOURS PRN
Status: DISCONTINUED | OUTPATIENT
Start: 2025-04-08 | End: 2025-04-10 | Stop reason: HOSPADM

## 2025-04-08 RX ORDER — DARUNAVIR 800 MG/1
800 TABLET, FILM COATED ORAL
Status: DISCONTINUED | OUTPATIENT
Start: 2025-04-08 | End: 2025-04-09

## 2025-04-08 RX ORDER — ENOXAPARIN SODIUM 100 MG/ML
30 INJECTION SUBCUTANEOUS EVERY 24 HOURS
Status: DISCONTINUED | OUTPATIENT
Start: 2025-04-08 | End: 2025-04-10 | Stop reason: HOSPADM

## 2025-04-08 RX ORDER — ACETAMINOPHEN 650 MG/1
650 SUPPOSITORY RECTAL EVERY 6 HOURS PRN
Status: DISCONTINUED | OUTPATIENT
Start: 2025-04-08 | End: 2025-04-10 | Stop reason: HOSPADM

## 2025-04-08 RX ORDER — ACETAMINOPHEN 325 MG/1
650 TABLET ORAL EVERY 6 HOURS PRN
Status: DISCONTINUED | OUTPATIENT
Start: 2025-04-08 | End: 2025-04-10 | Stop reason: HOSPADM

## 2025-04-08 RX ORDER — ABACAVIR AND LAMIVUDINE 600; 300 MG/1; MG/1
1 TABLET, FILM COATED ORAL EVERY EVENING
Status: DISCONTINUED | OUTPATIENT
Start: 2025-04-08 | End: 2025-04-08 | Stop reason: CLARIF

## 2025-04-08 RX ORDER — ABACAVIR 300 MG/1
600 TABLET ORAL
Status: DISCONTINUED | OUTPATIENT
Start: 2025-04-08 | End: 2025-04-09

## 2025-04-08 RX ORDER — SODIUM CHLORIDE 0.9 % (FLUSH) 0.9 %
5-40 SYRINGE (ML) INJECTION PRN
Status: DISCONTINUED | OUTPATIENT
Start: 2025-04-08 | End: 2025-04-10 | Stop reason: HOSPADM

## 2025-04-08 RX ORDER — METOPROLOL SUCCINATE 50 MG/1
100 TABLET, EXTENDED RELEASE ORAL DAILY
Status: DISCONTINUED | OUTPATIENT
Start: 2025-04-09 | End: 2025-04-08

## 2025-04-08 RX ORDER — SODIUM CHLORIDE 9 MG/ML
INJECTION, SOLUTION INTRAVENOUS PRN
Status: DISCONTINUED | OUTPATIENT
Start: 2025-04-08 | End: 2025-04-10 | Stop reason: HOSPADM

## 2025-04-08 RX ORDER — SODIUM CHLORIDE 0.9 % (FLUSH) 0.9 %
5-40 SYRINGE (ML) INJECTION EVERY 12 HOURS SCHEDULED
Status: DISCONTINUED | OUTPATIENT
Start: 2025-04-08 | End: 2025-04-10 | Stop reason: HOSPADM

## 2025-04-08 RX ORDER — ONDANSETRON 4 MG/1
4 TABLET, ORALLY DISINTEGRATING ORAL EVERY 8 HOURS PRN
Status: DISCONTINUED | OUTPATIENT
Start: 2025-04-08 | End: 2025-04-10 | Stop reason: HOSPADM

## 2025-04-08 RX ORDER — LAMIVUDINE 150 MG/1
300 TABLET, FILM COATED ORAL
Status: DISCONTINUED | OUTPATIENT
Start: 2025-04-08 | End: 2025-04-09

## 2025-04-08 RX ORDER — METOPROLOL SUCCINATE 50 MG/1
50 TABLET, EXTENDED RELEASE ORAL DAILY
Status: DISCONTINUED | OUTPATIENT
Start: 2025-04-09 | End: 2025-04-10 | Stop reason: HOSPADM

## 2025-04-08 RX ADMIN — SODIUM CHLORIDE 1000 ML: 0.9 INJECTION, SOLUTION INTRAVENOUS at 09:39

## 2025-04-08 RX ADMIN — SODIUM CHLORIDE: 0.9 INJECTION, SOLUTION INTRAVENOUS at 14:56

## 2025-04-08 RX ADMIN — DARUNAVIR 800 MG: 800 TABLET, FILM COATED ORAL at 20:27

## 2025-04-08 RX ADMIN — LAMIVUDINE 300 MG: 150 TABLET, FILM COATED ORAL at 20:35

## 2025-04-08 RX ADMIN — SODIUM CHLORIDE, PRESERVATIVE FREE 10 ML: 5 INJECTION INTRAVENOUS at 20:35

## 2025-04-08 RX ADMIN — COBICISTAT 150 MG: 150 TABLET, FILM COATED ORAL at 20:27

## 2025-04-08 RX ADMIN — ABACAVIR 600 MG: 300 TABLET, FILM COATED ORAL at 20:35

## 2025-04-08 RX ADMIN — SODIUM CHLORIDE, PRESERVATIVE FREE 10 ML: 5 INJECTION INTRAVENOUS at 20:32

## 2025-04-08 ASSESSMENT — PAIN - FUNCTIONAL ASSESSMENT: PAIN_FUNCTIONAL_ASSESSMENT: 0-10

## 2025-04-08 ASSESSMENT — PAIN SCALES - GENERAL
PAINLEVEL_OUTOF10: 0
PAINLEVEL_OUTOF10: 4

## 2025-04-08 ASSESSMENT — PAIN DESCRIPTION - LOCATION: LOCATION: ABDOMEN

## 2025-04-08 ASSESSMENT — PAIN DESCRIPTION - PAIN TYPE: TYPE: ACUTE PAIN

## 2025-04-08 NOTE — H&P
Hospital Medicine History & Physical      PCP: Itzel Gardiner MD    Date of Admission: 4/8/2025    Date of Service: Pt seen/examined on 4/8/25 and Admitted to Inpatient with expected LOS greater than two midnights due to medical therapy. .    Chief Complaint: Elevated creatinine, diarrhea      History Of Present Illness:      49 y.o. male with history of CKD stage III, chronic HIV with hx AIDS on HAART, hypertension presented to emergency room with worsening renal function.  The was seen in the urgent care on 3/19 with a flulike illness-stayed negative for flu and COVID, received Z-Christian..  About 1 week ago he started having diarrhea, with mild cramping, no nausea vomiting, fever or chills.  He was seen by his PCP on 4/ 7/25 who ordered routine blood work, creatinine was 5.82 he was advised to come to ED immediately  In the ED, BP was 115/54, pulse 62, respirations 18, temperature 98.4  Repeat creatinine today was 3.8, previously noted to be 0.95 1/15        Past Medical History:          Diagnosis Date    Asthma     Degenerative spinal arthritis     Histoplasmosis     HIV (human immunodeficiency virus infection) (HCC)     Hypertension     Overweight     Vitamin D deficiency        Past Surgical History:          Procedure Laterality Date    ANKLE SURGERY Right     CARPAL TUNNEL RELEASE Right 11/30/2021    RIGHT CARPAL TUNNEL RELEASE performed by David Elliott MD at [facility] OR    CONDYLOMA EXCISION      OTHER SURGICAL HISTORY Right 01/23/2018    Right First dorsal extensor compartment tendon release.    WRIST SURGERY Left 10/15/2019    LEFT FIRST DORSAL COMPARTMENT (DEQUERVAIN'S) RELEASE performed by David Elliott MD at [facility] OR       Medications Prior to Admission:      Prior to Admission medications    Medication Sig Start Date End Date Taking? Authorizing Provider   ondansetron (ZOFRAN) 4 MG tablet Take 1 tablet by mouth 3 times daily as needed for Nausea or Vomiting 4/4/25   Tom Cooper, DO

## 2025-04-08 NOTE — ED NOTES
Berry Mendez Sr is a 49 y.o. male admitted for  Active Problems:    * No active hospital problems. *  Resolved Problems:    * No resolved hospital problems. *  .   Patient Home via self with   Chief Complaint   Patient presents with    Abnormal Lab     Pt presents to the ER with a report from his PMD that his latest lab work that was drawn yesterday has a worsening kidney function. Pt states he was advised to come to the ER for fluids to see if its dehydration for failure. Bun 58, Creat 5.82, GFR 11   .  Patient is alert and Person, Place, and Time  Patient's baseline mobility: Baseline Mobility: Independent   Code Status: No Order   Cardiac Rhythm:       Is patient on baseline Oxygen: no how many Liters:   Abnormal Assessment Findings: As reported pt presents with report from his PMD that his kidney function is worse than it was in Jan. Of this year. Pt is asymptomatic. Pt states he had his original lab work drawn due to chronic abd pain with no known etiology.     Isolation: None      NIH Score:    C-SSRS: Risk of Suicide: No Risk  Bedside swallow:        Active LDA's:   Peripheral IV 04/08/25 Right;Dorsal Forearm (Active)   Site Assessment Clean, dry & intact 04/08/25 0855   Line Status Blood return noted;Flushed;Specimen collected;Normal saline locked 04/08/25 0855   Line Care Cap changed 04/08/25 0855   Phlebitis Assessment No symptoms 04/08/25 0855   Infiltration Assessment 0 04/08/25 0855   Alcohol Cap Used No 04/08/25 0855   Dressing Status New dressing applied 04/08/25 0855   Dressing Type Transparent 04/08/25 0855   Dressing Intervention New 04/08/25 0855     Patient admitted with a flores: no If the flores is chronic was it exchanged:NA  Reason for flores:   Patient admitted with Central Line:  NA . PICC line placement confirmed: YES OR NO:[id number]}   Reason for Central line:   Was central line Inserted from an outside facility: no       Family/Caregiver Present no Any Concerns: no   Restraints no  Sitter

## 2025-04-08 NOTE — ED PROVIDER NOTES
[facility] EMERGENCY DEPARTMENT    CHIEF COMPLAINT  Abnormal Lab (Pt presents to the ER with a report from his PMD that his latest lab work that was drawn yesterday has a worsening kidney function. Pt states he was advised to come to the ER for fluids to see if its dehydration for failure. Bun 58, Creat 5.82, GFR 11)       HISTORY OF PRESENT ILLNESS  Berry Mendez Sr is a 49 y.o. male who presents to the ED with concern for worsening renal dysfunction. Outpatient labs demonstrated a Cr of 5.8. Pt was seen in this ED 3/19/2025 with complaint of diarrhea, found to have Cr of 2.1 at that time. Pt reports diarrhea x 1 week. Denies fever, chest pain, SOB, nausea, vomiting. Stools are nonbloody. Denies abdominal pain or dysuria. History of HIV on antiretroviral. CD4 count 2 months ago 412.     I have reviewed the following from the nursing documentation:    Past Medical History:   Diagnosis Date    Asthma     Degenerative spinal arthritis     Histoplasmosis     HIV (human immunodeficiency virus infection) (HCC)     Hypertension     Overweight     Vitamin D deficiency      Past Surgical History:   Procedure Laterality Date    ANKLE SURGERY Right     CARPAL TUNNEL RELEASE Right 11/30/2021    RIGHT CARPAL TUNNEL RELEASE performed by David Elliott MD at [facility] OR    CONDYLOMA EXCISION      OTHER SURGICAL HISTORY Right 01/23/2018    Right First dorsal extensor compartment tendon release.    WRIST SURGERY Left 10/15/2019    LEFT FIRST DORSAL COMPARTMENT (DEQUERVAIN'S) RELEASE performed by David Elliott MD at [facility] OR     Family History   Problem Relation Age of Onset    Diabetes Mother     Heart Attack Mother     Heart Disease Mother      Social History     Socioeconomic History    Marital status:      Spouse name: Not on file    Number of children: Not on file    Years of education: Not on file    Highest education level: Not on file   Occupational History    Occupation:    Tobacco Use    Smoking

## 2025-04-09 LAB
ANION GAP SERPL CALCULATED.3IONS-SCNC: 10 MMOL/L (ref 3–16)
BASOPHILS # BLD: 0 K/UL (ref 0–0.2)
BASOPHILS NFR BLD: 0.7 %
BUN SERPL-MCNC: 43 MG/DL (ref 7–20)
CALCIUM SERPL-MCNC: 8.3 MG/DL (ref 8.3–10.6)
CHLORIDE SERPL-SCNC: 110 MMOL/L (ref 99–110)
CO2 SERPL-SCNC: 19 MMOL/L (ref 21–32)
CREAT SERPL-MCNC: 2.3 MG/DL (ref 0.9–1.3)
DEPRECATED RDW RBC AUTO: 15.1 % (ref 12.4–15.4)
EOSINOPHIL # BLD: 0.1 K/UL (ref 0–0.6)
EOSINOPHIL NFR BLD: 1.7 %
GFR SERPLBLD CREATININE-BSD FMLA CKD-EPI: 34 ML/MIN/{1.73_M2}
GLUCOSE SERPL-MCNC: 95 MG/DL (ref 70–99)
HCT VFR BLD AUTO: 35 % (ref 40.5–52.5)
HGB BLD-MCNC: 11.9 G/DL (ref 13.5–17.5)
LYMPHOCYTES # BLD: 2.2 K/UL (ref 1–5.1)
LYMPHOCYTES NFR BLD: 49.9 %
MCH RBC QN AUTO: 33.6 PG (ref 26–34)
MCHC RBC AUTO-ENTMCNC: 33.9 G/DL (ref 31–36)
MCV RBC AUTO: 99 FL (ref 80–100)
MONOCYTES # BLD: 0.6 K/UL (ref 0–1.3)
MONOCYTES NFR BLD: 12.7 %
NEUTROPHILS # BLD: 1.6 K/UL (ref 1.7–7.7)
NEUTROPHILS NFR BLD: 35 %
PLATELET # BLD AUTO: 238 K/UL (ref 135–450)
PMV BLD AUTO: 8.1 FL (ref 5–10.5)
POTASSIUM SERPL-SCNC: 4.4 MMOL/L (ref 3.5–5.1)
RBC # BLD AUTO: 3.53 M/UL (ref 4.2–5.9)
SODIUM SERPL-SCNC: 139 MMOL/L (ref 136–145)
WBC # BLD AUTO: 4.5 K/UL (ref 4–11)

## 2025-04-09 PROCEDURE — 94760 N-INVAS EAR/PLS OXIMETRY 1: CPT

## 2025-04-09 PROCEDURE — 1200000000 HC SEMI PRIVATE

## 2025-04-09 PROCEDURE — 2580000003 HC RX 258: Performed by: HOSPITALIST

## 2025-04-09 PROCEDURE — 85025 COMPLETE CBC W/AUTO DIFF WBC: CPT

## 2025-04-09 PROCEDURE — 80048 BASIC METABOLIC PNL TOTAL CA: CPT

## 2025-04-09 PROCEDURE — 36415 COLL VENOUS BLD VENIPUNCTURE: CPT

## 2025-04-09 PROCEDURE — 6360000002 HC RX W HCPCS: Performed by: HOSPITALIST

## 2025-04-09 PROCEDURE — 6370000000 HC RX 637 (ALT 250 FOR IP): Performed by: HOSPITALIST

## 2025-04-09 RX ORDER — DARUNAVIR 800 MG/1
800 TABLET, FILM COATED ORAL EVERY EVENING
Status: DISCONTINUED | OUTPATIENT
Start: 2025-04-09 | End: 2025-04-10 | Stop reason: HOSPADM

## 2025-04-09 RX ORDER — CHOLECALCIFEROL (VITAMIN D3) 25 MCG
1000 TABLET ORAL EVERY EVENING
COMMUNITY
Start: 2025-03-26

## 2025-04-09 RX ORDER — LIDOCAINE 50 MG/G
1 PATCH TOPICAL DAILY PRN
Status: ON HOLD | COMMUNITY
Start: 2025-03-26 | End: 2025-04-10 | Stop reason: HOSPADM

## 2025-04-09 RX ORDER — EMTRICITABINE 200 MG/1
200 CAPSULE ORAL EVERY EVENING
Status: DISCONTINUED | OUTPATIENT
Start: 2025-04-09 | End: 2025-04-10 | Stop reason: HOSPADM

## 2025-04-09 RX ADMIN — ENOXAPARIN SODIUM 30 MG: 100 INJECTION SUBCUTANEOUS at 12:35

## 2025-04-09 RX ADMIN — SODIUM CHLORIDE: 0.9 INJECTION, SOLUTION INTRAVENOUS at 15:31

## 2025-04-09 RX ADMIN — DARUNAVIR 800 MG: 800 TABLET, FILM COATED ORAL at 17:22

## 2025-04-09 RX ADMIN — TENOFOVIR ALAFENAMIDE 12.5 MG: 25 TABLET ORAL at 17:22

## 2025-04-09 RX ADMIN — ACETAMINOPHEN 650 MG: 325 TABLET ORAL at 20:20

## 2025-04-09 RX ADMIN — COBICISTAT 150 MG: 150 TABLET, FILM COATED ORAL at 17:22

## 2025-04-09 RX ADMIN — ACETAMINOPHEN 650 MG: 325 TABLET ORAL at 09:51

## 2025-04-09 RX ADMIN — METOPROLOL SUCCINATE 50 MG: 50 TABLET, EXTENDED RELEASE ORAL at 08:13

## 2025-04-09 RX ADMIN — EMTRICITABINE 200 MG: 200 CAPSULE ORAL at 17:21

## 2025-04-09 ASSESSMENT — PAIN DESCRIPTION - DESCRIPTORS
DESCRIPTORS: ACHING
DESCRIPTORS: ACHING

## 2025-04-09 ASSESSMENT — PAIN SCALES - GENERAL
PAINLEVEL_OUTOF10: 8
PAINLEVEL_OUTOF10: 0
PAINLEVEL_OUTOF10: 8
PAINLEVEL_OUTOF10: 8
PAINLEVEL_OUTOF10: 3

## 2025-04-09 ASSESSMENT — PAIN DESCRIPTION - LOCATION
LOCATION: BACK
LOCATION: ABDOMEN;BACK

## 2025-04-09 ASSESSMENT — PAIN DESCRIPTION - ORIENTATION
ORIENTATION: MID
ORIENTATION: LEFT;RIGHT

## 2025-04-09 ASSESSMENT — PAIN - FUNCTIONAL ASSESSMENT: PAIN_FUNCTIONAL_ASSESSMENT: PREVENTS OR INTERFERES SOME ACTIVE ACTIVITIES AND ADLS

## 2025-04-09 ASSESSMENT — PAIN SCALES - WONG BAKER: WONGBAKER_NUMERICALRESPONSE: NO HURT

## 2025-04-09 NOTE — PLAN OF CARE
Problem: Discharge Planning  Goal: Discharge to home or other facility with appropriate resources  4/9/2025 0959 by Ke Kim RN  Outcome: Progressing  4/8/2025 2122 by Jeanine Watson RN  Outcome: Progressing  4/8/2025 2121 by Jeanine Watson RN  Outcome: Progressing     Problem: Pain  Goal: Verbalizes/displays adequate comfort level or baseline comfort level  4/9/2025 0959 by Ke Kim RN  Outcome: Progressing  4/8/2025 2122 by Jeanine Watson RN  Outcome: Progressing  4/8/2025 2121 by Jeanine Watson RN  Outcome: Progressing     Problem: Safety - Adult  Goal: Free from fall injury  4/9/2025 0959 by Ke Kim RN  Outcome: Progressing  4/8/2025 2122 by Jeanine Watson RN  Outcome: Progressing  4/8/2025 2121 by Jeainne Watson RN  Outcome: Progressing     Problem: Skin/Tissue Integrity - Adult  Goal: Skin integrity remains intact  4/9/2025 0959 by Ke Kim RN  Outcome: Progressing  4/8/2025 2122 by Jeanine Watson RN  Outcome: Progressing     Problem: Musculoskeletal - Adult  Goal: Return mobility to safest level of function  4/9/2025 0959 by Ke Kim RN  Outcome: Progressing  4/8/2025 2122 by Jeanine Watson RN  Outcome: Progressing     Problem: Infection - Adult  Goal: Absence of infection at discharge  4/9/2025 0959 by Ke Kim RN  Outcome: Progressing  4/8/2025 2122 by Jeanine Watson RN  Outcome: Progressing     Problem: Hematologic - Adult  Goal: Maintains hematologic stability  4/9/2025 0959 by Ke Kim RN  Outcome: Progressing  4/8/2025 2122 by Jeanine Watson RN  Outcome: Progressing

## 2025-04-09 NOTE — CARE COORDINATION
Discharge Planning:      (CM) reviewed the patient's chart to assess needs. Patient's Readmission Risk Score is 14%  . Patient's medical insurance is  Payor:  EAST / Plan:  [employer]AST [employer] / Product Type: *No Product type* / .  Patient's PCP is Itzel Gardiner MD .  No needs anticipated, at this time. CM team to follow. Staff to inform CM if additional discharge needs arise.    Pts preferred pharmacy is   [facility] [id number] [place], [place] [facility] [address] -  [phone] - F [phone]  [address] [facility] [place] [address]  Phone: [phone] Fax: [phone]    Electronically signed by GLORIA Lomas on 4/9/2025 at 3:30 PM  [phone]

## 2025-04-10 VITALS
WEIGHT: 204.81 LBS | SYSTOLIC BLOOD PRESSURE: 158 MMHG | RESPIRATION RATE: 16 BRPM | DIASTOLIC BLOOD PRESSURE: 82 MMHG | BODY MASS INDEX: 27.14 KG/M2 | TEMPERATURE: 98.6 F | HEART RATE: 64 BPM | HEIGHT: 73 IN | OXYGEN SATURATION: 98 %

## 2025-04-10 LAB
ANION GAP SERPL CALCULATED.3IONS-SCNC: 9 MMOL/L (ref 3–16)
BASOPHILS # BLD: 0 K/UL (ref 0–0.2)
BASOPHILS NFR BLD: 0.8 %
BUN SERPL-MCNC: 29 MG/DL (ref 7–20)
CALCIUM SERPL-MCNC: 8.4 MG/DL (ref 8.3–10.6)
CHLORIDE SERPL-SCNC: 111 MMOL/L (ref 99–110)
CO2 SERPL-SCNC: 20 MMOL/L (ref 21–32)
CREAT SERPL-MCNC: 1.8 MG/DL (ref 0.9–1.3)
CRYPTOSP AG STL QL IA: NORMAL
DEPRECATED RDW RBC AUTO: 14.8 % (ref 12.4–15.4)
E HISTOLYT AG STL QL IA: NORMAL
EOSINOPHIL # BLD: 0.1 K/UL (ref 0–0.6)
EOSINOPHIL NFR BLD: 2.1 %
G LAMBLIA AG STL QL IA: NORMAL
GFR SERPLBLD CREATININE-BSD FMLA CKD-EPI: 45 ML/MIN/{1.73_M2}
GLUCOSE SERPL-MCNC: 92 MG/DL (ref 70–99)
HCT VFR BLD AUTO: 35.8 % (ref 40.5–52.5)
HGB BLD-MCNC: 12.1 G/DL (ref 13.5–17.5)
LYMPHOCYTES # BLD: 2.3 K/UL (ref 1–5.1)
LYMPHOCYTES NFR BLD: 46.2 %
MCH RBC QN AUTO: 33.5 PG (ref 26–34)
MCHC RBC AUTO-ENTMCNC: 33.9 G/DL (ref 31–36)
MCV RBC AUTO: 99 FL (ref 80–100)
MONOCYTES # BLD: 0.9 K/UL (ref 0–1.3)
MONOCYTES NFR BLD: 18 %
NEUTROPHILS # BLD: 1.7 K/UL (ref 1.7–7.7)
NEUTROPHILS NFR BLD: 32.9 %
PLATELET # BLD AUTO: 224 K/UL (ref 135–450)
PMV BLD AUTO: 8.5 FL (ref 5–10.5)
POTASSIUM SERPL-SCNC: 4.5 MMOL/L (ref 3.5–5.1)
RBC # BLD AUTO: 3.62 M/UL (ref 4.2–5.9)
SODIUM SERPL-SCNC: 140 MMOL/L (ref 136–145)
WBC # BLD AUTO: 5.1 K/UL (ref 4–11)

## 2025-04-10 PROCEDURE — 80048 BASIC METABOLIC PNL TOTAL CA: CPT

## 2025-04-10 PROCEDURE — 94760 N-INVAS EAR/PLS OXIMETRY 1: CPT

## 2025-04-10 PROCEDURE — 36415 COLL VENOUS BLD VENIPUNCTURE: CPT

## 2025-04-10 PROCEDURE — 6370000000 HC RX 637 (ALT 250 FOR IP): Performed by: HOSPITALIST

## 2025-04-10 PROCEDURE — 85025 COMPLETE CBC W/AUTO DIFF WBC: CPT

## 2025-04-10 RX ORDER — AMLODIPINE BESYLATE 10 MG/1
10 TABLET ORAL DAILY
Qty: 30 TABLET | Refills: 0 | Status: SHIPPED | OUTPATIENT
Start: 2025-04-10

## 2025-04-10 RX ADMIN — METOPROLOL SUCCINATE 50 MG: 50 TABLET, EXTENDED RELEASE ORAL at 08:02

## 2025-04-10 NOTE — DISCHARGE INSTRUCTIONS
Follow-up with your primary care physician in 1 week, to have your kidney function repeated..  Continue to hold losartan and hydrochlorothiazide given risk of causing further kidney injury.  Avoid nonsteroidal inflammatory medications like ibuprofen, Aleve.  Ensure adequate hydration

## 2025-04-10 NOTE — CARE COORDINATION
CASE MANAGEMENT DISCHARGE SUMMARY:  No Needs    DISCHARGE DATE: 4/10/25    DISCHARGED TO HOME     TRANSPORTATION: Family/Friend             TIME: 1:00pm         Electronically signed by GLORIA Gold on 4/10/2025 at 1:22 PM

## 2025-04-10 NOTE — DISCHARGE SUMMARY
auscultation  Gastrointestinal: Soft, non tender  Neuro: Alert.  Psych: Mood appropriate.         Labs and Imaging   No results found.    CBC:   Recent Labs     04/08/25  0900 04/09/25  0547 04/10/25  0459   WBC 4.5 4.5 5.1   HGB 13.5 11.9* 12.1*    238 224     BMP:    Recent Labs     04/08/25  0900 04/09/25  0547 04/10/25  0459   * 139 140   K 4.0 4.4 4.5    110 111*   CO2 17* 19* 20*   BUN 53* 43* 29*   CREATININE 3.8* 2.3* 1.8*   GLUCOSE 103* 95 92     Hepatic:   Recent Labs     04/08/25 0900   AST 29   ALT 20   BILITOT 0.3   ALKPHOS 85     Lipids: No results found for: \"CHOL\", \"HDL\", \"TRIG\"  Hemoglobin A1C: No results found for: \"LABA1C\"  TSH: No results found for: \"TSH\"  Troponin: No results found for: \"TROPONINT\"  Lactic Acid: No results for input(s): \"LACTA\" in the last 72 hours.  BNP: No results for input(s): \"PROBNP\" in the last 72 hours.  UA:  Lab Results   Component Value Date/Time    NITRU Negative 04/08/2025 09:00 AM    COLORU Yellow 04/08/2025 09:00 AM    PHUR 6.0 04/08/2025 09:00 AM    PHUR 6.0 08/06/2023 10:58 PM    WBCUA 0-2 04/08/2025 09:00 AM    RBCUA 0-2 04/08/2025 09:00 AM    MUCUS 1+ 08/06/2023 11:09 PM    TRICHOMONAS None Seen 04/08/2025 09:00 AM    BACTERIA 1+ 08/06/2023 11:09 PM    CLARITYU Clear 04/08/2025 09:00 AM    LEUKOCYTESUR Negative 04/08/2025 09:00 AM    UROBILINOGEN 0.2 04/08/2025 09:00 AM    BILIRUBINUR Negative 04/08/2025 09:00 AM    BLOODU Negative 04/08/2025 09:00 AM    GLUCOSEU Negative 04/08/2025 09:00 AM    KETUA Negative 04/08/2025 09:00 AM     Urine Cultures:   Lab Results   Component Value Date/Time    LABURIN No growth at 18 to 36 hours 08/06/2023 11:09 PM     Blood Cultures: No results found for: \"BC\"  No results found for: \"BLOODCULT2\"  Organism: No results found for: \"ORG\"    Time Spent Discharging patient 50 minutes    Electronically signed by Aidan Rabago MD on 4/10/2025 at 12:23 PM

## 2025-04-10 NOTE — PROGRESS NOTES
4 Eyes Skin Assessment     NAME:  Berry Mendez Sr  YOB: 1975  MEDICAL RECORD NUMBER:  [id number]    The patient is being assessed for  Admission    I agree that at least one RN has performed a thorough Head to Toe Skin Assessment on the patient. ALL assessment sites listed below have been assessed.      Areas assessed by both nurses:    Head, Face, Ears, Shoulders, Back, Chest, Arms, Elbows, Hands, Sacrum. Buttock, Coccyx, Ischium, and Legs. Feet and Heels        Does the Patient have a Wound? No noted wound(s)       Uche Prevention initiated by RN: No  Wound Care Orders initiated by RN: No    Pressure Injury (Stage 3,4, Unstageable, DTI, NWPT, and Complex wounds) if present, place Wound referral order by RN under : No    New Ostomies, if present place, Ostomy referral order under : No     Nurse 1 eSignature: Electronically signed by LIGIA DOUGLAS RN on 4/8/25 at 12:58 PM EDT    **SHARE this note so that the co-signing nurse can place an eSignature**    Nurse 2 eSignature: {Esignature:[id number]}   
Comprehensive Nutrition Assessment    Type and Reason for Visit:  Positive nutrition screen    Nutrition Recommendations/Plan:   Continue current diet with Ensure TID.  Will reassess supplements at follow up.      Malnutrition Assessment:  Malnutrition Status:  At risk for malnutrition (04/09/25 1043)    Context:  Chronic Illness     Findings of the 6 clinical characteristics of malnutrition:  Energy Intake:  Mild decrease in energy intake  Weight Loss:  Greater than 10% over 6 months (13%)       Nutrition Assessment:    MST=3. Pt. admitted for LUAN. Currently receiving a JUNITO diet. Diet acceptance is poor at this time. Pt. is tolerating foods with no N/V but not meeting needs. Ensure started TID. Agree with supplement order, will keep in place for now and reassess at follow up. NS running at 125 mL/hr cont. Significant weight loss obvered, 13% x 6 months. Will continue to monitor nutritional adequacy while inpatient.    Nutrition Related Findings:    4/9 Nutrition labs reviewed. LBM 4/9. Wound Type: None       Current Nutrition Intake & Therapies:    Average Meal Intake: 26-50%  Average Supplements Intake: Unable to assess  ADULT DIET; Regular  ADULT ORAL NUTRITION SUPPLEMENT; Breakfast, Lunch, Dinner; Standard High Calorie/High Protein Oral Supplement    Anthropometric Measures:  Height: 185.4 cm (6' 0.99\")  Ideal Body Weight (IBW): 184 lbs (84 kg)       Current Body Weight: 91 kg (200 lb 9.9 oz), 109 % IBW.    Current BMI (kg/m2): 26.5  Usual Body Weight: 104.8 kg (231 lb)     % Weight Change (Calculated): -13.2  Weight Adjustment For: No Adjustment                 BMI Categories: Overweight (BMI 25.0-29.9)    Estimated Daily Nutrient Needs:  Energy Requirements Based On: Kcal/kg  Weight Used for Energy Requirements: Current  Energy (kcal/day): 2086-2295 kcal (20-25 kcal/kg)  Weight Used for Protein Requirements: Current  Protein (g/day): 73-91 g (0.8-1g/kg)  Method Used for Fluid Requirements: 1 ml/kcal  Fluid 
Medication Reconciliation    List of medications patient is currently taking is complete.     Source of information: 1. Conversation with patient at bedside                                      2. EPIC records        Notes regarding home medications:   1. Epzicom and Prezcobix removed from list.  2. Symtuza added to list.      Anny Strickland [facility], PharmD, 4/9/2025 10:29 AM          
Patient alert and oriented X4. Patient request to removed right forearm IV. Patient tolerated well. Site clean, dry, intact. Patient states 7 out of 10 lower back pain. Followed PRN pain management protocol. See MAR. Patient tolerated well. Patient verbalizes understanding of education. All needed items within reach. Electronically signed by Ashlee Campa RN on 4/9/2025 at 8:26 PM   
Pt arrived to rm [id number]. Pt alert and oriented to room and call light. VSS. Pt states no need at this time. Electronically signed by LIGIA DOUGLAS RN on 4/8/2025 at 1:00 PM   
Pt provided d/c instructions, all questions answered at this time. Pt's IV removed without complication. Pt waiting on ride. Electronically signed by Ke Kim RN on 4/10/2025 at 1:04 PM    
recognition software and may contain errors related to that system including errors in grammar, punctuation, and spelling, as well as words and phrases that may be inappropriate. If there are any questions or concerns, please feel free to contact the dictating provider for clarification.

## 2025-04-10 NOTE — PLAN OF CARE
Problem: Discharge Planning  Goal: Discharge to home or other facility with appropriate resources  4/9/2025 2323 by Ashlee Campa RN  Outcome: Progressing  Flowsheets (Taken 4/9/2025 2115)  Discharge to home or other facility with appropriate resources:   Identify barriers to discharge with patient and caregiver   Arrange for needed discharge resources and transportation as appropriate     Problem: Pain  Goal: Verbalizes/displays adequate comfort level or baseline comfort level  4/9/2025 2323 by Ashlee Campa RN  Outcome: Progressing     Problem: Safety - Adult  Goal: Free from fall injury  4/9/2025 2323 by Ashlee Campa RN  Outcome: Progressing     Problem: Skin/Tissue Integrity - Adult  Goal: Skin integrity remains intact  4/9/2025 2323 by Ashlee Campa RN  Outcome: Progressing  Flowsheets (Taken 4/9/2025 2115)  Skin Integrity Remains Intact: Monitor for areas of redness and/or skin breakdown     Problem: Musculoskeletal - Adult  Goal: Return mobility to safest level of function  4/9/2025 2323 by Ashlee Campa RN  Outcome: Progressing  Flowsheets (Taken 4/9/2025 2115)  Return Mobility to Safest Level of Function: Assess patient stability and activity tolerance for standing, transferring and ambulating with or without assistive devices     Problem: Infection - Adult  Goal: Absence of infection at discharge  4/9/2025 2323 by Ashlee Campa RN  Outcome: Progressing  Flowsheets (Taken 4/9/2025 2115)  Absence of infection at discharge: Assess and monitor for signs and symptoms of infection     Problem: Hematologic - Adult  Goal: Maintains hematologic stability  4/9/2025 2323 by Ashlee Campa RN  Outcome: Progressing  Flowsheets (Taken 4/9/2025 2115)  Maintains hematologic stability: Assess for signs and symptoms of bleeding or hemorrhage     Problem: Nutrition Deficit:  Goal: Optimize nutritional status  4/9/2025 2323 by Ashlee Campa RN  Outcome: Progressing

## (undated) DEVICE — UNDERGLOVE SURG SZ 8 FNGR THK0.21MIL GRN LTX BEAD CUF

## (undated) DEVICE — GLOVE SURG SZ 65 L12IN FNGR THK87MIL WHT LTX FREE

## (undated) DEVICE — SHEET,DRAPE,53X77,STERILE: Brand: MEDLINE

## (undated) DEVICE — GOWN SIRUS NONREIN XL W/TWL: Brand: MEDLINE INDUSTRIES, INC.

## (undated) DEVICE — SOLUTION IV IRRIG 250ML ST LF 0.9% SODIUM 2F7122

## (undated) DEVICE — APPLICATOR MEDICATED 26 CC SOLUTION HI LT ORNG CHLORAPREP

## (undated) DEVICE — GOWN,SIRUS,POLYRNF,BRTHSLV,XL,30/CS: Brand: MEDLINE

## (undated) DEVICE — SYRINGE MED 10ML LUERLOCK TIP W/O SFTY DISP

## (undated) DEVICE — ZIMMER® STERILE DISPOSABLE TOURNIQUET CUFF WITH PLC, DUAL PORT, SINGLE BLADDER, 18 IN. (46 CM)

## (undated) DEVICE — GLOVE SURG SZ 8 L12IN FNGR THK94MIL STD WHT LTX SYN POLYMER

## (undated) DEVICE — DRESSING PETRO W3XL3IN OIL EMUL N ADH GZ KNIT IMPREG CELOS

## (undated) DEVICE — BANDAGE COMPR W4INXL12FT E DISP ESMARCH EVEN

## (undated) DEVICE — MINOR SET UP PK

## (undated) DEVICE — CHLORAPREP 26ML ORANGE

## (undated) DEVICE — Z DISCONTINUED USE 2272117 DRAPE SURG 3 QTR N INVASIVE 2 LAYR DISP

## (undated) DEVICE — DRAPE HND W114XL142IN BLU POLYPR W O PCH FEN CRD AND TB HLDR

## (undated) DEVICE — COVER LT HNDL BLU PLAS

## (undated) DEVICE — GLOVE SURG SZ 65 CRM LTX FREE POLYISOPRENE POLYMER BEAD ANTI

## (undated) DEVICE — SPONGE GZ W4XL4IN COT 12 PLY TYP VII WVN C FLD DSGN

## (undated) DEVICE — NEEDLE HYPO 18GA L1.5IN THN WALL PIVOTING SHLD BVL ORIENTED

## (undated) DEVICE — BANDAGE COMPR W2INXL5YD TAN BRTH SELF ADH WRP W/ HND TEAR

## (undated) DEVICE — NEEDLE HYPO 25GA L1.5IN BVL ORIENTED ECLIPSE

## (undated) DEVICE — WRAP COHESIVE W2INXL5YD TAN SELF ADH BNDG HND NON STERILE TEAR CARING

## (undated) DEVICE — SUTURE CHROMIC GUT SZ 4-0 L27IN ABSRB BRN FS-2 L19MM 3/8 635H

## (undated) DEVICE — GLOVE SURG SZ 75 CRM LTX FREE POLYISOPRENE POLYMER BEAD ANTI

## (undated) DEVICE — Z DISCONTINUED USE 2275676 GLOVE SURG SZ 65 L12IN FNGR THK87MIL DK GRN LTX FREE ISOLEX